# Patient Record
Sex: FEMALE | Race: WHITE | Employment: OTHER | URBAN - NONMETROPOLITAN AREA
[De-identification: names, ages, dates, MRNs, and addresses within clinical notes are randomized per-mention and may not be internally consistent; named-entity substitution may affect disease eponyms.]

---

## 2017-05-19 ENCOUNTER — TELEPHONE (OUTPATIENT)
Dept: CARDIOLOGY | Age: 62
End: 2017-05-19

## 2017-05-22 ENCOUNTER — TELEPHONE (OUTPATIENT)
Dept: CARDIOLOGY | Age: 62
End: 2017-05-22

## 2017-05-25 ENCOUNTER — OFFICE VISIT (OUTPATIENT)
Dept: CARDIOLOGY | Age: 62
End: 2017-05-25
Payer: COMMERCIAL

## 2017-05-25 VITALS
HEIGHT: 66 IN | DIASTOLIC BLOOD PRESSURE: 82 MMHG | WEIGHT: 197 LBS | SYSTOLIC BLOOD PRESSURE: 142 MMHG | BODY MASS INDEX: 31.66 KG/M2 | HEART RATE: 60 BPM

## 2017-05-25 DIAGNOSIS — I48.91 ATRIAL FIBRILLATION, UNSPECIFIED TYPE (HCC): ICD-10-CM

## 2017-05-25 PROCEDURE — 99245 OFF/OP CONSLTJ NEW/EST HI 55: CPT | Performed by: INTERNAL MEDICINE

## 2017-05-25 PROCEDURE — 93000 ELECTROCARDIOGRAM COMPLETE: CPT | Performed by: INTERNAL MEDICINE

## 2017-05-25 RX ORDER — LOSARTAN POTASSIUM 50 MG/1
50 TABLET ORAL DAILY
Qty: 90 TABLET | Refills: 3 | Status: SHIPPED | OUTPATIENT
Start: 2017-05-25 | End: 2017-10-16 | Stop reason: SDUPTHER

## 2017-05-25 RX ORDER — LEVOTHYROXINE SODIUM 0.12 MG/1
125 TABLET ORAL DAILY
COMMUNITY

## 2017-05-25 RX ORDER — METOPROLOL SUCCINATE 25 MG/1
25 TABLET, EXTENDED RELEASE ORAL DAILY
Qty: 90 TABLET | Refills: 3 | Status: SHIPPED | OUTPATIENT
Start: 2017-05-25 | End: 2017-10-16

## 2017-05-26 ENCOUNTER — TELEPHONE (OUTPATIENT)
Dept: CARDIOLOGY | Age: 62
End: 2017-05-26

## 2017-05-26 DIAGNOSIS — R07.89 OTHER CHEST PAIN: Primary | ICD-10-CM

## 2017-05-30 ENCOUNTER — TELEPHONE (OUTPATIENT)
Dept: CARDIOLOGY | Age: 62
End: 2017-05-30

## 2017-05-31 ENCOUNTER — HOSPITAL ENCOUNTER (OUTPATIENT)
Dept: NON INVASIVE DIAGNOSTICS | Age: 62
Discharge: HOME | End: 2017-05-31
Payer: COMMERCIAL

## 2017-05-31 ENCOUNTER — TELEPHONE (OUTPATIENT)
Dept: CARDIOLOGY | Age: 62
End: 2017-05-31

## 2017-05-31 VITALS — DIASTOLIC BLOOD PRESSURE: 77 MMHG | RESPIRATION RATE: 16 BRPM | HEART RATE: 98 BPM | SYSTOLIC BLOOD PRESSURE: 194 MMHG

## 2017-05-31 DIAGNOSIS — R07.89 OTHER CHEST PAIN: Primary | ICD-10-CM

## 2017-05-31 DIAGNOSIS — R07.89 OTHER CHEST PAIN: ICD-10-CM

## 2017-05-31 PROCEDURE — 2580000003 HC RX 258: Performed by: INTERNAL MEDICINE

## 2017-05-31 PROCEDURE — 6360000002 HC RX W HCPCS: Performed by: INTERNAL MEDICINE

## 2017-05-31 PROCEDURE — 93350 STRESS TTE ONLY: CPT

## 2017-05-31 RX ORDER — DOBUTAMINE HYDROCHLORIDE 200 MG/100ML
10 INJECTION INTRAVENOUS ONCE
Status: DISCONTINUED | OUTPATIENT
Start: 2017-05-31 | End: 2017-05-31 | Stop reason: ALTCHOICE

## 2017-05-31 RX ORDER — ATROPINE SULFATE 0.1 MG/ML
1 INJECTION INTRAVENOUS PRN
Status: ACTIVE | OUTPATIENT
Start: 2017-05-31 | End: 2017-06-01

## 2017-05-31 RX ORDER — SODIUM CHLORIDE 0.9 % (FLUSH) 0.9 %
10 SYRINGE (ML) INJECTION PRN
Status: ACTIVE | OUTPATIENT
Start: 2017-05-31 | End: 2017-06-01

## 2017-05-31 RX ORDER — SODIUM CHLORIDE 9 MG/ML
INJECTION, SOLUTION INTRAVENOUS
Status: COMPLETED | OUTPATIENT
Start: 2017-05-31 | End: 2017-05-31

## 2017-05-31 RX ADMIN — DOBUTAMINE HYDROCHLORIDE 10 MCG/KG/MIN: 200 INJECTION INTRAVENOUS at 09:30

## 2017-05-31 RX ADMIN — SODIUM CHLORIDE 50 ML: 9 INJECTION, SOLUTION INTRAVENOUS at 09:30

## 2017-05-31 RX ADMIN — Medication 10 ML: at 09:20

## 2017-05-31 RX ADMIN — ATROPINE SULFATE 0.5 MG: 0.1 INJECTION PARENTERAL at 09:38

## 2017-06-01 ENCOUNTER — TELEPHONE (OUTPATIENT)
Dept: CARDIOLOGY | Age: 62
End: 2017-06-01

## 2017-06-02 DIAGNOSIS — I48.91 ATRIAL FIBRILLATION, UNSPECIFIED TYPE (HCC): Primary | ICD-10-CM

## 2017-06-07 ENCOUNTER — TELEPHONE (OUTPATIENT)
Dept: CARDIOLOGY | Age: 62
End: 2017-06-07

## 2017-06-09 ENCOUNTER — TELEPHONE (OUTPATIENT)
Dept: CARDIOLOGY | Age: 62
End: 2017-06-09

## 2017-06-09 ENCOUNTER — SURG/PROC ORDERS (OUTPATIENT)
Dept: CARDIOLOGY | Age: 62
End: 2017-06-09

## 2017-06-09 DIAGNOSIS — R94.39 ABNORMAL STRESS ECHO: Primary | ICD-10-CM

## 2017-06-12 RX ORDER — SODIUM CHLORIDE 9 MG/ML
INJECTION, SOLUTION INTRAVENOUS CONTINUOUS
Status: CANCELLED | OUTPATIENT
Start: 2017-06-20

## 2017-06-19 ENCOUNTER — TELEPHONE (OUTPATIENT)
Dept: CARDIOLOGY | Age: 62
End: 2017-06-19

## 2017-06-21 ENCOUNTER — APPOINTMENT (OUTPATIENT)
Dept: GENERAL RADIOLOGY | Age: 62
End: 2017-06-21
Attending: INTERNAL MEDICINE
Payer: COMMERCIAL

## 2017-06-21 ENCOUNTER — HOSPITAL ENCOUNTER (OUTPATIENT)
Dept: CARDIAC CATH/INVASIVE PROCEDURES | Age: 62
Discharge: HOME | End: 2017-06-21
Attending: INTERNAL MEDICINE | Admitting: INTERNAL MEDICINE
Payer: COMMERCIAL

## 2017-06-21 VITALS
HEART RATE: 47 BPM | RESPIRATION RATE: 18 BRPM | OXYGEN SATURATION: 97 % | DIASTOLIC BLOOD PRESSURE: 65 MMHG | TEMPERATURE: 98.7 F | SYSTOLIC BLOOD PRESSURE: 133 MMHG

## 2017-06-21 DIAGNOSIS — R94.39 ABNORMAL STRESS ECHO: ICD-10-CM

## 2017-06-21 LAB
ANION GAP SERPL CALCULATED.3IONS-SCNC: 13 MMOL/L (ref 7–19)
BUN BLDV-MCNC: 19 MG/DL (ref 8–23)
CALCIUM SERPL-MCNC: 9.5 MG/DL (ref 8.8–10.2)
CHLORIDE BLD-SCNC: 102 MMOL/L (ref 98–111)
CO2: 27 MMOL/L (ref 22–29)
CREAT SERPL-MCNC: 0.6 MG/DL (ref 0.5–0.9)
GFR NON-AFRICAN AMERICAN: >60
GLUCOSE BLD-MCNC: 121 MG/DL (ref 74–109)
HCT VFR BLD CALC: 39 % (ref 37–47)
HEMOGLOBIN: 12.7 G/DL (ref 12–16)
MCH RBC QN AUTO: 30.2 PG (ref 27–31)
MCHC RBC AUTO-ENTMCNC: 32.6 G/DL (ref 33–37)
MCV RBC AUTO: 92.6 FL (ref 81–99)
PDW BLD-RTO: 12.4 % (ref 11.5–14.5)
PLATELET # BLD: 183 K/UL (ref 130–400)
PMV BLD AUTO: 10.4 FL (ref 9.4–12.3)
POTASSIUM SERPL-SCNC: 3.7 MMOL/L (ref 3.5–5)
RBC # BLD: 4.21 M/UL (ref 4.2–5.4)
SODIUM BLD-SCNC: 142 MMOL/L (ref 136–145)
WBC # BLD: 5.7 K/UL (ref 4.8–10.8)

## 2017-06-21 PROCEDURE — 36415 COLL VENOUS BLD VENIPUNCTURE: CPT

## 2017-06-21 PROCEDURE — 93458 L HRT ARTERY/VENTRICLE ANGIO: CPT | Performed by: INTERNAL MEDICINE

## 2017-06-21 PROCEDURE — 2709999900 HC NON-CHARGEABLE SUPPLY

## 2017-06-21 PROCEDURE — 2500000003 HC RX 250 WO HCPCS

## 2017-06-21 PROCEDURE — 80048 BASIC METABOLIC PNL TOTAL CA: CPT

## 2017-06-21 PROCEDURE — 85027 COMPLETE CBC AUTOMATED: CPT

## 2017-06-21 PROCEDURE — 6360000002 HC RX W HCPCS

## 2017-06-21 PROCEDURE — 93005 ELECTROCARDIOGRAM TRACING: CPT

## 2017-06-21 PROCEDURE — C1887 CATHETER, GUIDING: HCPCS

## 2017-06-21 PROCEDURE — 71020 XR CHEST STANDARD TWO VW: CPT

## 2017-06-21 PROCEDURE — C1894 INTRO/SHEATH, NON-LASER: HCPCS

## 2017-06-21 PROCEDURE — 2720000001 HC MISC SURG SUPPLY STERILE $51-500

## 2017-06-21 RX ORDER — SODIUM CHLORIDE 0.9 % (FLUSH) 0.9 %
10 SYRINGE (ML) INJECTION PRN
Status: DISCONTINUED | OUTPATIENT
Start: 2017-06-21 | End: 2017-06-21 | Stop reason: HOSPADM

## 2017-06-21 RX ORDER — SODIUM CHLORIDE 9 MG/ML
INJECTION, SOLUTION INTRAVENOUS CONTINUOUS
Status: DISCONTINUED | OUTPATIENT
Start: 2017-06-21 | End: 2017-06-21 | Stop reason: HOSPADM

## 2017-06-21 RX ORDER — SODIUM CHLORIDE 0.9 % (FLUSH) 0.9 %
10 SYRINGE (ML) INJECTION EVERY 12 HOURS SCHEDULED
Status: DISCONTINUED | OUTPATIENT
Start: 2017-06-21 | End: 2017-06-21 | Stop reason: HOSPADM

## 2017-06-21 RX ORDER — SODIUM CHLORIDE 9 MG/ML
INJECTION, SOLUTION INTRAVENOUS CONTINUOUS
Status: ACTIVE | OUTPATIENT
Start: 2017-06-21 | End: 2017-06-21

## 2017-07-13 ENCOUNTER — OFFICE VISIT (OUTPATIENT)
Dept: CARDIOLOGY | Age: 62
End: 2017-07-13
Payer: COMMERCIAL

## 2017-07-13 VITALS
WEIGHT: 195 LBS | SYSTOLIC BLOOD PRESSURE: 110 MMHG | HEART RATE: 60 BPM | HEIGHT: 66 IN | BODY MASS INDEX: 31.34 KG/M2 | DIASTOLIC BLOOD PRESSURE: 70 MMHG

## 2017-07-13 DIAGNOSIS — I48.91 ATRIAL FIBRILLATION, UNSPECIFIED TYPE (HCC): Primary | ICD-10-CM

## 2017-07-13 PROCEDURE — 99214 OFFICE O/P EST MOD 30 MIN: CPT | Performed by: INTERNAL MEDICINE

## 2017-07-13 PROCEDURE — 93000 ELECTROCARDIOGRAM COMPLETE: CPT | Performed by: INTERNAL MEDICINE

## 2017-08-01 ENCOUNTER — TELEPHONE (OUTPATIENT)
Dept: CARDIOLOGY | Age: 62
End: 2017-08-01

## 2017-08-14 ENCOUNTER — TELEPHONE (OUTPATIENT)
Dept: CARDIOLOGY | Age: 62
End: 2017-08-14

## 2017-08-16 DIAGNOSIS — I48.91 ATRIAL FIBRILLATION, UNSPECIFIED TYPE (HCC): Primary | ICD-10-CM

## 2017-10-16 ENCOUNTER — OFFICE VISIT (OUTPATIENT)
Dept: CARDIOLOGY | Age: 62
End: 2017-10-16
Payer: COMMERCIAL

## 2017-10-16 VITALS
DIASTOLIC BLOOD PRESSURE: 68 MMHG | HEART RATE: 64 BPM | BODY MASS INDEX: 32.62 KG/M2 | HEIGHT: 66 IN | SYSTOLIC BLOOD PRESSURE: 124 MMHG | WEIGHT: 203 LBS

## 2017-10-16 DIAGNOSIS — I48.0 PAROXYSMAL ATRIAL FIBRILLATION (HCC): Primary | ICD-10-CM

## 2017-10-16 PROCEDURE — 99214 OFFICE O/P EST MOD 30 MIN: CPT | Performed by: INTERNAL MEDICINE

## 2017-10-16 PROCEDURE — 93000 ELECTROCARDIOGRAM COMPLETE: CPT | Performed by: INTERNAL MEDICINE

## 2017-10-16 RX ORDER — METOPROLOL SUCCINATE 25 MG/1
12.5 TABLET, EXTENDED RELEASE ORAL NIGHTLY
Status: ON HOLD | COMMUNITY
End: 2018-03-21 | Stop reason: HOSPADM

## 2017-10-17 RX ORDER — LOSARTAN POTASSIUM 50 MG/1
50 TABLET ORAL 2 TIMES DAILY
Qty: 60 TABLET | Refills: 3 | Status: ON HOLD | OUTPATIENT
Start: 2017-10-17 | End: 2018-03-17 | Stop reason: DRUGHIGH

## 2017-10-17 NOTE — PROGRESS NOTES
6\" (1.676 m)   Wt 203 lb (92.1 kg)   BMI 32.77 kg/m²    HEENT  The head is normocephalic. Moist mucous membranes. Neck - Supple, without increased jugular venous pressures. No carotid bruits. Respiratory -   Clear  Cardiovascular  Normal heart sounds. Abdominal -  Soft. Bowel sounds present. Nontender. Extremities - Warm. Posterior tibial and radial pulses are normal.   Musculoskeletal - Gait/station is normal.    Skin - Normal turgor, warm. Neurological - Answers questions appropriately with normal affect and mood. Data   to EKG from today shows normal sinus rhythm poor R-wave progression. Her multi-day heart monitor which I reviewed showed sinus rhythm, resting bradycardia, short burst of atrial tachycardia. Almost all of her triggers were secondary to the ectopy occasionally however they were also related to her very brief episodes of atrial tachycardia. Impression/Plan    Paroxysmal atrial fibrillation without recurrence  PACs and PVCs  Brief episodes of atrial tachycardia  Essential hypertension    We will increase her losartan to 50 mg twice a day and she will continue checking her blood pressure. She asked me many questions about natural remedies, over-the-counter and I tried to answer those questions but told her that we do not have data on lots of the over-the-counter medications available. Follow up with me in 3 months. She'll contact me if she has any prolonged episodes of palpitations. Jeannine Contreras. Rut Ruffin M.D.   [facility]

## 2018-02-12 ENCOUNTER — OFFICE VISIT (OUTPATIENT)
Dept: CARDIOLOGY | Age: 63
End: 2018-02-12
Payer: COMMERCIAL

## 2018-02-12 VITALS
HEART RATE: 53 BPM | WEIGHT: 201 LBS | DIASTOLIC BLOOD PRESSURE: 82 MMHG | SYSTOLIC BLOOD PRESSURE: 138 MMHG | BODY MASS INDEX: 33.49 KG/M2 | HEIGHT: 65 IN

## 2018-02-12 DIAGNOSIS — I48.0 PAROXYSMAL ATRIAL FIBRILLATION (HCC): Primary | ICD-10-CM

## 2018-02-12 PROCEDURE — 93000 ELECTROCARDIOGRAM COMPLETE: CPT | Performed by: INTERNAL MEDICINE

## 2018-02-12 PROCEDURE — 99214 OFFICE O/P EST MOD 30 MIN: CPT | Performed by: INTERNAL MEDICINE

## 2018-03-17 ENCOUNTER — HOSPITAL ENCOUNTER (INPATIENT)
Age: 63
LOS: 3 days | Discharge: HOME | DRG: 310 | End: 2018-03-21
Attending: INTERNAL MEDICINE | Admitting: INTERNAL MEDICINE
Payer: COMMERCIAL

## 2018-03-17 PROBLEM — I48.91 ATRIAL FIBRILLATION WITH RVR (HCC): Status: ACTIVE | Noted: 2018-03-17

## 2018-03-17 LAB
T4 FREE: 1.2 NG/DL (ref 0.9–1.7)
TROPONIN: <0.01 NG/ML (ref 0–0.03)
TROPONIN: <0.01 NG/ML (ref 0–0.03)
TSH SERPL DL<=0.05 MIU/L-ACNC: 9.33 UIU/ML (ref 0.27–4.2)

## 2018-03-17 PROCEDURE — 6370000000 HC RX 637 (ALT 250 FOR IP): Performed by: INTERNAL MEDICINE

## 2018-03-17 PROCEDURE — 84443 ASSAY THYROID STIM HORMONE: CPT

## 2018-03-17 PROCEDURE — G0378 HOSPITAL OBSERVATION PER HR: HCPCS

## 2018-03-17 PROCEDURE — 84484 ASSAY OF TROPONIN QUANT: CPT

## 2018-03-17 PROCEDURE — 99222 1ST HOSP IP/OBS MODERATE 55: CPT | Performed by: INTERNAL MEDICINE

## 2018-03-17 PROCEDURE — 2580000003 HC RX 258: Performed by: INTERNAL MEDICINE

## 2018-03-17 PROCEDURE — 84439 ASSAY OF FREE THYROXINE: CPT

## 2018-03-17 PROCEDURE — 2500000003 HC RX 250 WO HCPCS: Performed by: INTERNAL MEDICINE

## 2018-03-17 PROCEDURE — 36415 COLL VENOUS BLD VENIPUNCTURE: CPT

## 2018-03-17 RX ORDER — ACETAMINOPHEN 325 MG/1
650 TABLET ORAL EVERY 4 HOURS PRN
Status: DISCONTINUED | OUTPATIENT
Start: 2018-03-17 | End: 2018-03-21 | Stop reason: HOSPADM

## 2018-03-17 RX ORDER — LOSARTAN POTASSIUM 50 MG/1
50 TABLET ORAL EVERY MORNING
COMMUNITY
End: 2018-10-22 | Stop reason: SDUPTHER

## 2018-03-17 RX ORDER — ONDANSETRON 2 MG/ML
4 INJECTION INTRAMUSCULAR; INTRAVENOUS EVERY 6 HOURS PRN
Status: DISCONTINUED | OUTPATIENT
Start: 2018-03-17 | End: 2018-03-21 | Stop reason: HOSPADM

## 2018-03-17 RX ORDER — METOPROLOL SUCCINATE 25 MG/1
25 TABLET, EXTENDED RELEASE ORAL 2 TIMES DAILY
Status: DISCONTINUED | OUTPATIENT
Start: 2018-03-17 | End: 2018-03-18

## 2018-03-17 RX ORDER — ASPIRIN 81 MG/1
81 TABLET, CHEWABLE ORAL DAILY
Status: DISCONTINUED | OUTPATIENT
Start: 2018-03-17 | End: 2018-03-21 | Stop reason: HOSPADM

## 2018-03-17 RX ORDER — CHLORAL HYDRATE 500 MG
1000 CAPSULE ORAL 2 TIMES DAILY
COMMUNITY

## 2018-03-17 RX ORDER — LEVOTHYROXINE SODIUM 0.12 MG/1
125 TABLET ORAL DAILY
Status: DISCONTINUED | OUTPATIENT
Start: 2018-03-17 | End: 2018-03-21 | Stop reason: HOSPADM

## 2018-03-17 RX ORDER — SODIUM CHLORIDE 0.9 % (FLUSH) 0.9 %
10 SYRINGE (ML) INJECTION PRN
Status: DISCONTINUED | OUTPATIENT
Start: 2018-03-17 | End: 2018-03-21 | Stop reason: HOSPADM

## 2018-03-17 RX ORDER — LOSARTAN POTASSIUM 25 MG/1
25 TABLET ORAL NIGHTLY
COMMUNITY
End: 2018-10-22 | Stop reason: SDUPTHER

## 2018-03-17 RX ORDER — SODIUM CHLORIDE 0.9 % (FLUSH) 0.9 %
10 SYRINGE (ML) INJECTION EVERY 12 HOURS SCHEDULED
Status: DISCONTINUED | OUTPATIENT
Start: 2018-03-17 | End: 2018-03-21 | Stop reason: HOSPADM

## 2018-03-17 RX ORDER — LOSARTAN POTASSIUM 50 MG/1
25 TABLET ORAL NIGHTLY
Status: DISCONTINUED | OUTPATIENT
Start: 2018-03-17 | End: 2018-03-17

## 2018-03-17 RX ORDER — CETIRIZINE HYDROCHLORIDE 10 MG/1
10 TABLET ORAL DAILY
Status: DISCONTINUED | OUTPATIENT
Start: 2018-03-17 | End: 2018-03-21 | Stop reason: HOSPADM

## 2018-03-17 RX ORDER — LOSARTAN POTASSIUM 50 MG/1
50 TABLET ORAL EVERY MORNING
Status: DISCONTINUED | OUTPATIENT
Start: 2018-03-18 | End: 2018-03-21 | Stop reason: HOSPADM

## 2018-03-17 RX ORDER — METOPROLOL SUCCINATE 25 MG/1
12.5 TABLET, EXTENDED RELEASE ORAL NIGHTLY
Status: DISCONTINUED | OUTPATIENT
Start: 2018-03-17 | End: 2018-03-17

## 2018-03-17 RX ADMIN — ACETAMINOPHEN 650 MG: 325 TABLET, FILM COATED ORAL at 17:53

## 2018-03-17 RX ADMIN — ACETAMINOPHEN 650 MG: 325 TABLET, FILM COATED ORAL at 22:35

## 2018-03-17 RX ADMIN — APIXABAN 5 MG: 2.5 TABLET, FILM COATED ORAL at 20:54

## 2018-03-17 RX ADMIN — CETIRIZINE HYDROCHLORIDE 10 MG: 10 TABLET, FILM COATED ORAL at 17:53

## 2018-03-17 RX ADMIN — METOPROLOL SUCCINATE 25 MG: 25 TABLET, EXTENDED RELEASE ORAL at 20:54

## 2018-03-17 RX ADMIN — Medication 10 ML: at 20:55

## 2018-03-17 ASSESSMENT — ENCOUNTER SYMPTOMS
COUGH: 1
NAUSEA: 1
ORTHOPNEA: 0
SHORTNESS OF BREATH: 1
VOMITING: 1

## 2018-03-17 ASSESSMENT — PAIN SCALES - GENERAL
PAINLEVEL_OUTOF10: 4
PAINLEVEL_OUTOF10: 3
PAINLEVEL_OUTOF10: 0
PAINLEVEL_OUTOF10: 2
PAINLEVEL_OUTOF10: 0
PAINLEVEL_OUTOF10: 0

## 2018-03-17 NOTE — H&P
Onset    Heart Attack Mother     High Blood Pressure Father     Pacemaker Father     Diabetes Sister     High Blood Pressure Sister        Social History     Social History    Marital status:      Spouse name: N/A    Number of children: N/A    Years of education: N/A     Occupational History    Not on file. Social History Main Topics    Smoking status: Never Smoker    Smokeless tobacco: Never Used    Alcohol use No    Drug use: No    Sexual activity: Not on file     Other Topics Concern    Not on file     Social History Narrative    No narrative on file       No Known Allergies    Current Meds      Current Infused Meds   diltiazem         PE:  Vitals:    03/17/18 1010   BP: 133/86   Pulse: 81   Resp: 16   Temp: 97.6 °F (36.4 °C)   SpO2: 96%     No intake or output data in the 24 hours ending 03/17/18 1219  Estimated body mass index is 31.51 kg/m² as calculated from the following:    Height as of this encounter: 5' 6\" (1.676 m). Weight as of this encounter: 195 lb 4 oz (88.6 kg). General - No acute distress  Eyes - PERRL, anicteric sclerae; no lid-lag  ENMT - Atraumatic; Mucous membranes moist, oropharynx clear  Neck - trachea midline, thyroid non-tender  Cardio - No jugular venous distension                Clear s1 s2, Irregularly irregular, no gallop, rub, murmur                 No edema, normal pulses  Resp - Normal effort, Clear to auscultation bilaterally  GI - abdomen soft, non-tender, no hepatosplenomegaly  Skin - warm and dry; no rashes  Psych - A+O x 3, normal affect    No results for input(s): WBC, HGB, PLT in the last 72 hours. No results for input(s): NA, K, CL, CO2, BUN, CREATININE, LABGLOM, MG, CALCIUM, PHOS in the last 72 hours. No results for input(s): Kayren Persons in the last 72 hours. ECG 03/17/18 Atrial fibrillation with RVR 140s. No acute changes     TTE     Cath 06/22/2017 No significant CAD.       Assessment, Recommendations, & Plan:  60 y.o. female bilaterally  GI - abdomen soft, non-tender, no hepatosplenomegaly  Skin - warm and dry; no rashes  Psych - A+O x 3, normal affect    Assessment and plan: 26-year-old woman with paroxysmal atrial fibrillation with A. fib with RVR and hypertension and rhinorrhea    PAF - will start her on diltiazem drip, increase Toprol XL to 25 mg twice daily, consider transit thoracic echo to rule out structural heart disease    Hypertension - monitor blood pressure with additional Toprol-XL, decrease losartan to 50 mg daily    Rhinorrhea  - start Zyrtec 10 mg daily    Disposition - admit to observation to [facility]    Brandt Mooney MD, MSc  Director of the [facility] [place]    3/17/2018 4:31 PM

## 2018-03-18 LAB
ANION GAP SERPL CALCULATED.3IONS-SCNC: 12 MMOL/L (ref 7–19)
BUN BLDV-MCNC: 20 MG/DL (ref 8–23)
CALCIUM SERPL-MCNC: 9.2 MG/DL (ref 8.8–10.2)
CHLORIDE BLD-SCNC: 103 MMOL/L (ref 98–111)
CO2: 23 MMOL/L (ref 22–29)
CREAT SERPL-MCNC: 0.5 MG/DL (ref 0.5–0.9)
GFR NON-AFRICAN AMERICAN: >60
GLUCOSE BLD-MCNC: 115 MG/DL (ref 74–109)
HCT VFR BLD CALC: 44 % (ref 37–47)
HEMOGLOBIN: 13.9 G/DL (ref 12–16)
LV EF: 63 %
LVEF MODALITY: NORMAL
MCH RBC QN AUTO: 29.6 PG (ref 27–31)
MCHC RBC AUTO-ENTMCNC: 31.6 G/DL (ref 33–37)
MCV RBC AUTO: 93.8 FL (ref 81–99)
PDW BLD-RTO: 13.1 % (ref 11.5–14.5)
PLATELET # BLD: 184 K/UL (ref 130–400)
PMV BLD AUTO: 10.5 FL (ref 9.4–12.3)
POTASSIUM SERPL-SCNC: 4.1 MMOL/L (ref 3.5–5)
RBC # BLD: 4.69 M/UL (ref 4.2–5.4)
SODIUM BLD-SCNC: 138 MMOL/L (ref 136–145)
TROPONIN: <0.01 NG/ML (ref 0–0.03)
WBC # BLD: 9.4 K/UL (ref 4.8–10.8)

## 2018-03-18 PROCEDURE — 6370000000 HC RX 637 (ALT 250 FOR IP): Performed by: INTERNAL MEDICINE

## 2018-03-18 PROCEDURE — 80048 BASIC METABOLIC PNL TOTAL CA: CPT

## 2018-03-18 PROCEDURE — 2700000000 HC OXYGEN THERAPY PER DAY

## 2018-03-18 PROCEDURE — 93306 TTE W/DOPPLER COMPLETE: CPT

## 2018-03-18 PROCEDURE — 2580000003 HC RX 258: Performed by: INTERNAL MEDICINE

## 2018-03-18 PROCEDURE — G0378 HOSPITAL OBSERVATION PER HR: HCPCS

## 2018-03-18 PROCEDURE — 96366 THER/PROPH/DIAG IV INF ADDON: CPT

## 2018-03-18 PROCEDURE — 99231 SBSQ HOSP IP/OBS SF/LOW 25: CPT | Performed by: INTERNAL MEDICINE

## 2018-03-18 PROCEDURE — 2500000003 HC RX 250 WO HCPCS: Performed by: INTERNAL MEDICINE

## 2018-03-18 PROCEDURE — 36415 COLL VENOUS BLD VENIPUNCTURE: CPT

## 2018-03-18 PROCEDURE — 96365 THER/PROPH/DIAG IV INF INIT: CPT

## 2018-03-18 PROCEDURE — 93005 ELECTROCARDIOGRAM TRACING: CPT | Performed by: NURSE PRACTITIONER

## 2018-03-18 PROCEDURE — 85027 COMPLETE CBC AUTOMATED: CPT

## 2018-03-18 PROCEDURE — 84484 ASSAY OF TROPONIN QUANT: CPT

## 2018-03-18 RX ORDER — SODIUM CHLORIDE 0.9 % (FLUSH) 0.9 %
10 SYRINGE (ML) INJECTION EVERY 12 HOURS SCHEDULED
Status: CANCELLED | OUTPATIENT
Start: 2018-03-18

## 2018-03-18 RX ORDER — SODIUM CHLORIDE 0.9 % (FLUSH) 0.9 %
10 SYRINGE (ML) INJECTION PRN
Status: CANCELLED | OUTPATIENT
Start: 2018-03-18

## 2018-03-18 RX ORDER — SODIUM CHLORIDE 9 MG/ML
INJECTION, SOLUTION INTRAVENOUS CONTINUOUS
Status: CANCELLED | OUTPATIENT
Start: 2018-03-18

## 2018-03-18 RX ORDER — SOTALOL HYDROCHLORIDE 80 MG/1
80 TABLET ORAL 2 TIMES DAILY
Status: DISCONTINUED | OUTPATIENT
Start: 2018-03-18 | End: 2018-03-21 | Stop reason: HOSPADM

## 2018-03-18 RX ADMIN — DILTIAZEM HYDROCHLORIDE 5 MG/HR: 5 INJECTION INTRAVENOUS at 04:23

## 2018-03-18 RX ADMIN — APIXABAN 5 MG: 2.5 TABLET, FILM COATED ORAL at 20:42

## 2018-03-18 RX ADMIN — METOPROLOL SUCCINATE 25 MG: 25 TABLET, EXTENDED RELEASE ORAL at 09:43

## 2018-03-18 RX ADMIN — ASPIRIN 81 MG CHEWABLE TABLET 81 MG: 81 TABLET CHEWABLE at 09:43

## 2018-03-18 RX ADMIN — CETIRIZINE HYDROCHLORIDE 10 MG: 10 TABLET, FILM COATED ORAL at 09:43

## 2018-03-18 RX ADMIN — LEVOTHYROXINE SODIUM 125 MCG: 125 TABLET ORAL at 05:16

## 2018-03-18 RX ADMIN — APIXABAN 5 MG: 2.5 TABLET, FILM COATED ORAL at 09:43

## 2018-03-18 RX ADMIN — SOTALOL HYDROCHLORIDE 80 MG: 80 TABLET ORAL at 20:42

## 2018-03-18 RX ADMIN — Medication 10 ML: at 20:42

## 2018-03-18 ASSESSMENT — PAIN SCALES - GENERAL
PAINLEVEL_OUTOF10: 0
PAINLEVEL_OUTOF10: 0

## 2018-03-18 NOTE — PROGRESS NOTES
[facility]  Daily Progress Note      Chief Complaint: f/u afib    Interval Hx: no chest pain, palpitations, congestion better    ROS:  The rest of the systems are negative except Interval Hx    Current Inpatient Medications:   aspirin  81 mg Oral Daily    levothyroxine  125 mcg Oral Daily    losartan  50 mg Oral QAM    sodium chloride flush  10 mL Intravenous 2 times per day    apixaban  5 mg Oral BID    metoprolol succinate  25 mg Oral BID    cetirizine  10 mg Oral Daily       IV Infusions (if any):   diltiazem Stopped (03/18/18 1115)       Physical Exam:   /81   Pulse 87   Temp 97.1 °F (36.2 °C) (Temporal)   Resp 16   Ht 5' 6\" (1.676 m)   Wt 197 lb (89.4 kg)   SpO2 98%   Breastfeeding?  No   BMI 31.80 kg/m²       Intake/Output Summary (Last 24 hours) at 03/18/18 1259  Last data filed at 03/18/18 0855   Gross per 24 hour   Intake             1030 ml   Output              600 ml   Net              430 ml       Gen - NAD  Neck - Supple  ENMT - MMM, OP Clear  Cardio - No JVD    Irregurarly irregular, no gallop, rub, murmur                No edema, 2+ radials  Resp - Normal effort, CTA Bilat  GI - soft, non-tender, no HSM  Psych - A+O x 3, normal affect     Diagnostics:      Recent Labs      03/18/18   0616   WBC  9.4   HGB  13.9   PLT  184      Recent Labs      03/18/18   0616   NA  138   K  4.1   CL  103   CO2  23   BUN  20   CREATININE  0.5   LABGLOM  >60   CALCIUM  9.2      Recent Labs      03/17/18   1739  03/18/18   0038  03/18/18   0616   TROPONINI  <0.01  <0.01  <0.01        Tele - afib rate contorlled    Assessment:     60 y.o. female with PAF, rhinorhea    Plan:     - continue with current medical therapy  - check TTE for structural heart disease  - NPO tonight for DCCV tomorrow    Sally Gomes MD  [facility] [place]    3/18/2018 12:59 PM

## 2018-03-19 LAB
HCT VFR BLD CALC: 37.9 % (ref 37–47)
HEMOGLOBIN: 12.3 G/DL (ref 12–16)
MCH RBC QN AUTO: 30.4 PG (ref 27–31)
MCHC RBC AUTO-ENTMCNC: 32.5 G/DL (ref 33–37)
MCV RBC AUTO: 93.8 FL (ref 81–99)
PDW BLD-RTO: 13 % (ref 11.5–14.5)
PLATELET # BLD: 180 K/UL (ref 130–400)
PMV BLD AUTO: 10.4 FL (ref 9.4–12.3)
RBC # BLD: 4.04 M/UL (ref 4.2–5.4)
TROPONIN: <0.01 NG/ML (ref 0–0.03)
WBC # BLD: 8.8 K/UL (ref 4.8–10.8)

## 2018-03-19 PROCEDURE — 99231 SBSQ HOSP IP/OBS SF/LOW 25: CPT | Performed by: INTERNAL MEDICINE

## 2018-03-19 PROCEDURE — 84484 ASSAY OF TROPONIN QUANT: CPT

## 2018-03-19 PROCEDURE — 6370000000 HC RX 637 (ALT 250 FOR IP): Performed by: INTERNAL MEDICINE

## 2018-03-19 PROCEDURE — 85027 COMPLETE CBC AUTOMATED: CPT

## 2018-03-19 PROCEDURE — 36415 COLL VENOUS BLD VENIPUNCTURE: CPT

## 2018-03-19 PROCEDURE — 93005 ELECTROCARDIOGRAM TRACING: CPT

## 2018-03-19 PROCEDURE — 2580000003 HC RX 258: Performed by: INTERNAL MEDICINE

## 2018-03-19 PROCEDURE — 2700000000 HC OXYGEN THERAPY PER DAY

## 2018-03-19 PROCEDURE — 2140000000 HC CCU INTERMEDIATE R&B

## 2018-03-19 RX ADMIN — LOSARTAN POTASSIUM 50 MG: 50 TABLET ORAL at 08:55

## 2018-03-19 RX ADMIN — Medication 10 ML: at 08:55

## 2018-03-19 RX ADMIN — APIXABAN 5 MG: 2.5 TABLET, FILM COATED ORAL at 20:07

## 2018-03-19 RX ADMIN — LEVOTHYROXINE SODIUM 125 MCG: 125 TABLET ORAL at 08:55

## 2018-03-19 RX ADMIN — ASPIRIN 81 MG CHEWABLE TABLET 81 MG: 81 TABLET CHEWABLE at 08:55

## 2018-03-19 RX ADMIN — CETIRIZINE HYDROCHLORIDE 10 MG: 10 TABLET, FILM COATED ORAL at 08:55

## 2018-03-19 RX ADMIN — SOTALOL HYDROCHLORIDE 80 MG: 80 TABLET ORAL at 08:55

## 2018-03-19 RX ADMIN — Medication 10 ML: at 20:07

## 2018-03-19 RX ADMIN — APIXABAN 5 MG: 2.5 TABLET, FILM COATED ORAL at 08:55

## 2018-03-19 RX ADMIN — SOTALOL HYDROCHLORIDE 80 MG: 80 TABLET ORAL at 20:07

## 2018-03-19 ASSESSMENT — PAIN SCALES - GENERAL: PAINLEVEL_OUTOF10: 0

## 2018-03-20 LAB
ANION GAP SERPL CALCULATED.3IONS-SCNC: 14 MMOL/L (ref 7–19)
BUN BLDV-MCNC: 18 MG/DL (ref 8–23)
CALCIUM SERPL-MCNC: 9 MG/DL (ref 8.8–10.2)
CHLORIDE BLD-SCNC: 104 MMOL/L (ref 98–111)
CO2: 24 MMOL/L (ref 22–29)
CREAT SERPL-MCNC: 0.5 MG/DL (ref 0.5–0.9)
EKG P AXIS: 21 DEGREES
EKG P-R INTERVAL: 146 MS
EKG Q-T INTERVAL: 420 MS
EKG QRS DURATION: 94 MS
EKG QTC CALCULATION (BAZETT): 408 MS
EKG T AXIS: 27 DEGREES
GFR NON-AFRICAN AMERICAN: >60
GLUCOSE BLD-MCNC: 97 MG/DL (ref 74–109)
HCT VFR BLD CALC: 36.5 % (ref 37–47)
HEMOGLOBIN: 12.3 G/DL (ref 12–16)
MCH RBC QN AUTO: 30.7 PG (ref 27–31)
MCHC RBC AUTO-ENTMCNC: 33.7 G/DL (ref 33–37)
MCV RBC AUTO: 91 FL (ref 81–99)
PDW BLD-RTO: 12.6 % (ref 11.5–14.5)
PLATELET # BLD: 173 K/UL (ref 130–400)
PMV BLD AUTO: 10.4 FL (ref 9.4–12.3)
POTASSIUM SERPL-SCNC: 3.7 MMOL/L (ref 3.5–5)
RBC # BLD: 4.01 M/UL (ref 4.2–5.4)
SODIUM BLD-SCNC: 142 MMOL/L (ref 136–145)
TROPONIN: <0.01 NG/ML (ref 0–0.03)
WBC # BLD: 8.1 K/UL (ref 4.8–10.8)

## 2018-03-20 PROCEDURE — 2580000003 HC RX 258: Performed by: INTERNAL MEDICINE

## 2018-03-20 PROCEDURE — 2140000000 HC CCU INTERMEDIATE R&B

## 2018-03-20 PROCEDURE — 99231 SBSQ HOSP IP/OBS SF/LOW 25: CPT | Performed by: INTERNAL MEDICINE

## 2018-03-20 PROCEDURE — 84484 ASSAY OF TROPONIN QUANT: CPT

## 2018-03-20 PROCEDURE — 80048 BASIC METABOLIC PNL TOTAL CA: CPT

## 2018-03-20 PROCEDURE — 85027 COMPLETE CBC AUTOMATED: CPT

## 2018-03-20 PROCEDURE — 93005 ELECTROCARDIOGRAM TRACING: CPT

## 2018-03-20 PROCEDURE — 36415 COLL VENOUS BLD VENIPUNCTURE: CPT

## 2018-03-20 PROCEDURE — 6370000000 HC RX 637 (ALT 250 FOR IP): Performed by: NURSE PRACTITIONER

## 2018-03-20 PROCEDURE — 6370000000 HC RX 637 (ALT 250 FOR IP): Performed by: INTERNAL MEDICINE

## 2018-03-20 RX ORDER — FLUTICASONE PROPIONATE 50 MCG
1 SPRAY, SUSPENSION (ML) NASAL DAILY
Status: DISCONTINUED | OUTPATIENT
Start: 2018-03-20 | End: 2018-03-21 | Stop reason: HOSPADM

## 2018-03-20 RX ORDER — BENZONATATE 100 MG/1
100 CAPSULE ORAL 3 TIMES DAILY PRN
Status: DISCONTINUED | OUTPATIENT
Start: 2018-03-20 | End: 2018-03-21 | Stop reason: HOSPADM

## 2018-03-20 RX ADMIN — SOTALOL HYDROCHLORIDE 80 MG: 80 TABLET ORAL at 19:54

## 2018-03-20 RX ADMIN — Medication 10 ML: at 19:54

## 2018-03-20 RX ADMIN — Medication 10 ML: at 10:23

## 2018-03-20 RX ADMIN — LEVOTHYROXINE SODIUM 125 MCG: 125 TABLET ORAL at 05:40

## 2018-03-20 RX ADMIN — CETIRIZINE HYDROCHLORIDE 10 MG: 10 TABLET, FILM COATED ORAL at 10:22

## 2018-03-20 RX ADMIN — FLUTICASONE PROPIONATE 1 SPRAY: 50 SPRAY, METERED NASAL at 10:22

## 2018-03-20 RX ADMIN — APIXABAN 5 MG: 2.5 TABLET, FILM COATED ORAL at 19:54

## 2018-03-20 RX ADMIN — LOSARTAN POTASSIUM 50 MG: 50 TABLET ORAL at 10:22

## 2018-03-20 RX ADMIN — ASPIRIN 81 MG CHEWABLE TABLET 81 MG: 81 TABLET CHEWABLE at 10:22

## 2018-03-20 RX ADMIN — BENZONATATE 100 MG: 100 CAPSULE ORAL at 19:58

## 2018-03-20 RX ADMIN — BENZONATATE 100 MG: 100 CAPSULE ORAL at 10:22

## 2018-03-20 RX ADMIN — APIXABAN 5 MG: 2.5 TABLET, FILM COATED ORAL at 10:22

## 2018-03-20 RX ADMIN — SOTALOL HYDROCHLORIDE 80 MG: 80 TABLET ORAL at 10:22

## 2018-03-20 ASSESSMENT — PAIN SCALES - GENERAL
PAINLEVEL_OUTOF10: 0
PAINLEVEL_OUTOF10: 0

## 2018-03-21 VITALS
OXYGEN SATURATION: 98 % | BODY MASS INDEX: 32.05 KG/M2 | HEART RATE: 55 BPM | RESPIRATION RATE: 16 BRPM | WEIGHT: 199.4 LBS | DIASTOLIC BLOOD PRESSURE: 71 MMHG | SYSTOLIC BLOOD PRESSURE: 131 MMHG | TEMPERATURE: 97 F | HEIGHT: 66 IN

## 2018-03-21 LAB
ANION GAP SERPL CALCULATED.3IONS-SCNC: 13 MMOL/L (ref 7–19)
BUN BLDV-MCNC: 20 MG/DL (ref 8–23)
CALCIUM SERPL-MCNC: 9.1 MG/DL (ref 8.8–10.2)
CHLORIDE BLD-SCNC: 103 MMOL/L (ref 98–111)
CO2: 26 MMOL/L (ref 22–29)
CREAT SERPL-MCNC: 0.7 MG/DL (ref 0.5–0.9)
GFR NON-AFRICAN AMERICAN: >60
GLUCOSE BLD-MCNC: 103 MG/DL (ref 74–109)
HCT VFR BLD CALC: 36.1 % (ref 37–47)
HEMOGLOBIN: 11.9 G/DL (ref 12–16)
MCH RBC QN AUTO: 30.3 PG (ref 27–31)
MCHC RBC AUTO-ENTMCNC: 33 G/DL (ref 33–37)
MCV RBC AUTO: 91.9 FL (ref 81–99)
PDW BLD-RTO: 12.5 % (ref 11.5–14.5)
PLATELET # BLD: 181 K/UL (ref 130–400)
PMV BLD AUTO: 10.3 FL (ref 9.4–12.3)
POTASSIUM SERPL-SCNC: 4 MMOL/L (ref 3.5–5)
RBC # BLD: 3.93 M/UL (ref 4.2–5.4)
SODIUM BLD-SCNC: 142 MMOL/L (ref 136–145)
TROPONIN: <0.01 NG/ML (ref 0–0.03)
TROPONIN: <0.01 NG/ML (ref 0–0.03)
WBC # BLD: 7.7 K/UL (ref 4.8–10.8)

## 2018-03-21 PROCEDURE — 36415 COLL VENOUS BLD VENIPUNCTURE: CPT

## 2018-03-21 PROCEDURE — 6370000000 HC RX 637 (ALT 250 FOR IP): Performed by: INTERNAL MEDICINE

## 2018-03-21 PROCEDURE — 84484 ASSAY OF TROPONIN QUANT: CPT

## 2018-03-21 PROCEDURE — 80048 BASIC METABOLIC PNL TOTAL CA: CPT

## 2018-03-21 PROCEDURE — 99238 HOSP IP/OBS DSCHRG MGMT 30/<: CPT | Performed by: INTERNAL MEDICINE

## 2018-03-21 PROCEDURE — 2580000003 HC RX 258: Performed by: INTERNAL MEDICINE

## 2018-03-21 PROCEDURE — 6370000000 HC RX 637 (ALT 250 FOR IP): Performed by: NURSE PRACTITIONER

## 2018-03-21 PROCEDURE — 93005 ELECTROCARDIOGRAM TRACING: CPT

## 2018-03-21 PROCEDURE — 85027 COMPLETE CBC AUTOMATED: CPT

## 2018-03-21 RX ORDER — SOTALOL HYDROCHLORIDE 80 MG/1
80 TABLET ORAL 2 TIMES DAILY
Qty: 60 TABLET | Refills: 3 | Status: SHIPPED | OUTPATIENT
Start: 2018-03-21 | End: 2018-04-19 | Stop reason: SDUPTHER

## 2018-03-21 RX ADMIN — ASPIRIN 81 MG CHEWABLE TABLET 81 MG: 81 TABLET CHEWABLE at 08:16

## 2018-03-21 RX ADMIN — APIXABAN 5 MG: 2.5 TABLET, FILM COATED ORAL at 08:16

## 2018-03-21 RX ADMIN — LEVOTHYROXINE SODIUM 125 MCG: 125 TABLET ORAL at 06:30

## 2018-03-21 RX ADMIN — SOTALOL HYDROCHLORIDE 80 MG: 80 TABLET ORAL at 08:16

## 2018-03-21 RX ADMIN — BENZONATATE 100 MG: 100 CAPSULE ORAL at 08:16

## 2018-03-21 RX ADMIN — Medication 10 ML: at 08:17

## 2018-03-21 RX ADMIN — FLUTICASONE PROPIONATE 1 SPRAY: 50 SPRAY, METERED NASAL at 08:18

## 2018-03-21 RX ADMIN — CETIRIZINE HYDROCHLORIDE 10 MG: 10 TABLET, FILM COATED ORAL at 08:16

## 2018-03-21 RX ADMIN — LOSARTAN POTASSIUM 50 MG: 50 TABLET ORAL at 08:16

## 2018-03-22 LAB
EKG P AXIS: 40 DEGREES
EKG P AXIS: 54 DEGREES
EKG P-R INTERVAL: 150 MS
EKG P-R INTERVAL: 152 MS
EKG Q-T INTERVAL: 406 MS
EKG Q-T INTERVAL: 434 MS
EKG QRS DURATION: 94 MS
EKG QRS DURATION: 94 MS
EKG QTC CALCULATION (BAZETT): 398 MS
EKG QTC CALCULATION (BAZETT): 408 MS
EKG T AXIS: 45 DEGREES
EKG T AXIS: 59 DEGREES

## 2018-03-23 ENCOUNTER — HOSPITAL ENCOUNTER (EMERGENCY)
Age: 63
Discharge: HOME | End: 2018-03-24
Attending: EMERGENCY MEDICINE
Payer: COMMERCIAL

## 2018-03-23 DIAGNOSIS — E03.9 HYPOTHYROIDISM, UNSPECIFIED TYPE: ICD-10-CM

## 2018-03-23 DIAGNOSIS — I10 ESSENTIAL HYPERTENSION: Primary | ICD-10-CM

## 2018-03-23 PROCEDURE — 99284 EMERGENCY DEPT VISIT MOD MDM: CPT

## 2018-03-23 PROCEDURE — 36569 INSJ PICC 5 YR+ W/O IMAGING: CPT

## 2018-03-23 ASSESSMENT — PAIN SCALES - GENERAL: PAINLEVEL_OUTOF10: 1

## 2018-03-23 ASSESSMENT — PAIN DESCRIPTION - LOCATION: LOCATION: HEAD

## 2018-03-23 ASSESSMENT — PAIN DESCRIPTION - DESCRIPTORS: DESCRIPTORS: ACHING

## 2018-03-23 ASSESSMENT — PAIN DESCRIPTION - ORIENTATION: ORIENTATION: POSTERIOR;LOWER

## 2018-03-23 ASSESSMENT — PAIN DESCRIPTION - PAIN TYPE: TYPE: ACUTE PAIN

## 2018-03-24 ENCOUNTER — APPOINTMENT (OUTPATIENT)
Dept: GENERAL RADIOLOGY | Age: 63
End: 2018-03-24
Payer: COMMERCIAL

## 2018-03-24 VITALS
OXYGEN SATURATION: 96 % | RESPIRATION RATE: 17 BRPM | BODY MASS INDEX: 31.34 KG/M2 | TEMPERATURE: 97.7 F | DIASTOLIC BLOOD PRESSURE: 83 MMHG | HEIGHT: 66 IN | WEIGHT: 195 LBS | HEART RATE: 46 BPM | SYSTOLIC BLOOD PRESSURE: 157 MMHG

## 2018-03-24 LAB
ALBUMIN SERPL-MCNC: 3.8 G/DL (ref 3.5–5.2)
ALP BLD-CCNC: 99 U/L (ref 35–104)
ALT SERPL-CCNC: 11 U/L (ref 5–33)
ANION GAP SERPL CALCULATED.3IONS-SCNC: 12 MMOL/L (ref 7–19)
AST SERPL-CCNC: 14 U/L (ref 5–32)
BACTERIA: NEGATIVE /HPF
BASOPHILS ABSOLUTE: 0 K/UL (ref 0–0.2)
BASOPHILS RELATIVE PERCENT: 0.5 % (ref 0–1)
BILIRUB SERPL-MCNC: 0.3 MG/DL (ref 0.2–1.2)
BILIRUBIN URINE: NEGATIVE
BLOOD, URINE: ABNORMAL
BUN BLDV-MCNC: 19 MG/DL (ref 8–23)
CALCIUM SERPL-MCNC: 9.4 MG/DL (ref 8.8–10.2)
CHLORIDE BLD-SCNC: 101 MMOL/L (ref 98–111)
CLARITY: CLEAR
CO2: 24 MMOL/L (ref 22–29)
COLOR: YELLOW
CREAT SERPL-MCNC: 0.5 MG/DL (ref 0.5–0.9)
EOSINOPHILS ABSOLUTE: 0.1 K/UL (ref 0–0.6)
EOSINOPHILS RELATIVE PERCENT: 1.6 % (ref 0–5)
EPITHELIAL CELLS, UA: 1 /HPF (ref 0–5)
GFR NON-AFRICAN AMERICAN: >60
GLUCOSE BLD-MCNC: 100 MG/DL (ref 74–109)
GLUCOSE URINE: NEGATIVE MG/DL
HCT VFR BLD CALC: 39.3 % (ref 37–47)
HEMOGLOBIN: 12.9 G/DL (ref 12–16)
HYALINE CASTS: 0 /HPF (ref 0–8)
KETONES, URINE: NEGATIVE MG/DL
LEUKOCYTE ESTERASE, URINE: ABNORMAL
LYMPHOCYTES ABSOLUTE: 2.5 K/UL (ref 1.1–4.5)
LYMPHOCYTES RELATIVE PERCENT: 32.2 % (ref 20–40)
MCH RBC QN AUTO: 30.2 PG (ref 27–31)
MCHC RBC AUTO-ENTMCNC: 32.8 G/DL (ref 33–37)
MCV RBC AUTO: 92 FL (ref 81–99)
MONOCYTES ABSOLUTE: 0.8 K/UL (ref 0–0.9)
MONOCYTES RELATIVE PERCENT: 10.2 % (ref 0–10)
NEUTROPHILS ABSOLUTE: 4.3 K/UL (ref 1.5–7.5)
NEUTROPHILS RELATIVE PERCENT: 54.9 % (ref 50–65)
NITRITE, URINE: NEGATIVE
PDW BLD-RTO: 12.7 % (ref 11.5–14.5)
PH UA: 7
PLATELET # BLD: 213 K/UL (ref 130–400)
PMV BLD AUTO: 9.8 FL (ref 9.4–12.3)
POTASSIUM SERPL-SCNC: 3.5 MMOL/L (ref 3.5–5)
PROTEIN UA: NEGATIVE MG/DL
RBC # BLD: 4.27 M/UL (ref 4.2–5.4)
RBC UA: 16 /HPF (ref 0–4)
SODIUM BLD-SCNC: 137 MMOL/L (ref 136–145)
SPECIFIC GRAVITY UA: 1.01
T4 FREE: 1.3 NG/DL (ref 0.9–1.7)
TOTAL PROTEIN: 7.4 G/DL (ref 6.6–8.7)
TSH SERPL DL<=0.05 MIU/L-ACNC: 37.77 UIU/ML (ref 0.27–4.2)
UROBILINOGEN, URINE: 0.2 E.U./DL
WBC # BLD: 7.9 K/UL (ref 4.8–10.8)
WBC UA: 1 /HPF (ref 0–5)

## 2018-03-24 PROCEDURE — 99285 EMERGENCY DEPT VISIT HI MDM: CPT | Performed by: EMERGENCY MEDICINE

## 2018-03-24 PROCEDURE — 36415 COLL VENOUS BLD VENIPUNCTURE: CPT

## 2018-03-24 PROCEDURE — 80053 COMPREHEN METABOLIC PANEL: CPT

## 2018-03-24 PROCEDURE — 71046 X-RAY EXAM CHEST 2 VIEWS: CPT

## 2018-03-24 PROCEDURE — 85025 COMPLETE CBC W/AUTO DIFF WBC: CPT

## 2018-03-24 PROCEDURE — 81001 URINALYSIS AUTO W/SCOPE: CPT

## 2018-03-24 PROCEDURE — 84443 ASSAY THYROID STIM HORMONE: CPT

## 2018-03-24 PROCEDURE — 84439 ASSAY OF FREE THYROXINE: CPT

## 2018-03-24 ASSESSMENT — ENCOUNTER SYMPTOMS
PHOTOPHOBIA: 0
SHORTNESS OF BREATH: 0
CHEST TIGHTNESS: 0
SORE THROAT: 0
ABDOMINAL PAIN: 0
NAUSEA: 0
ABDOMINAL DISTENTION: 0
BACK PAIN: 0
WHEEZING: 0
CONSTIPATION: 0
TROUBLE SWALLOWING: 0
VOMITING: 0
COUGH: 0
COLOR CHANGE: 0
RHINORRHEA: 0
DIARRHEA: 0
EYE PAIN: 0

## 2018-03-24 NOTE — ED PROVIDER NOTES
[facility] EMERGENCY DEPT  eMERGENCY dEPARTMENT eNCOUnter      Pt Name: Steve Zhao  MRN: [id number]  [place] 1955  Date of evaluation: 3/23/2018  Provider: Conda Rinne, MD    CHIEF COMPLAINT       Chief Complaint   Patient presents with    Hypertension     States it was 197/116. States she has a headache. Was recently discharged due to a-fib. HISTORY OF PRESENT ILLNESS   (Location/Symptom, Timing/Onset, Context/Setting, Quality, Duration, Modifying Factors, Severity)  Note limiting factors. Steve Zhao is a 60 y.o. female who presents to the emergency department for elevated BP. Patient is a unknown hypertensive and is on no medication. She states that this evening after getting home from a baby shower she was experiencing slight headache. She took her blood pressure noticed it was elevated. She took an extra dose of her Cozaar, as per her PCP, to help with the increased pressure. She was not experiencing any shortness of breath, chest pain, slurred speech or vision change associated with the elevated blood pressure. HPI    Nursing Notes were reviewed. REVIEW OF SYSTEMS    (2-9 systems for level 4, 10 or more for level 5)     Review of Systems   Constitutional: Negative for activity change, appetite change, chills, fatigue and fever. HENT: Negative for congestion, ear pain, rhinorrhea, sore throat and trouble swallowing. Eyes: Negative for photophobia, pain and visual disturbance. Respiratory: Negative for cough, chest tightness, shortness of breath and wheezing. Cardiovascular: Negative for chest pain, palpitations and leg swelling. Gastrointestinal: Negative for abdominal distention, abdominal pain, constipation, diarrhea, nausea and vomiting. Genitourinary: Negative for difficulty urinating, dysuria, flank pain, urgency, vaginal bleeding and vaginal discharge. Musculoskeletal: Negative for back pain, myalgias and neck stiffness.    Skin: Negative for color change, pallor

## 2018-03-26 LAB
EKG P AXIS: 54 DEGREES
EKG P-R INTERVAL: 156 MS
EKG Q-T INTERVAL: 504 MS
EKG QRS DURATION: 96 MS
EKG QTC CALCULATION (BAZETT): 473 MS
EKG T AXIS: 29 DEGREES

## 2018-04-19 RX ORDER — SOTALOL HYDROCHLORIDE 80 MG/1
80 TABLET ORAL 2 TIMES DAILY
Qty: 60 TABLET | Refills: 5 | Status: SHIPPED | OUTPATIENT
Start: 2018-04-19 | End: 2018-04-23 | Stop reason: DRUGHIGH

## 2018-04-23 ENCOUNTER — OFFICE VISIT (OUTPATIENT)
Dept: CARDIOLOGY | Age: 63
End: 2018-04-23
Payer: COMMERCIAL

## 2018-04-23 VITALS
BODY MASS INDEX: 32.21 KG/M2 | WEIGHT: 200.4 LBS | DIASTOLIC BLOOD PRESSURE: 62 MMHG | HEIGHT: 66 IN | HEART RATE: 56 BPM | SYSTOLIC BLOOD PRESSURE: 138 MMHG

## 2018-04-23 DIAGNOSIS — I10 ESSENTIAL HYPERTENSION: ICD-10-CM

## 2018-04-23 DIAGNOSIS — I48.91 ATRIAL FIBRILLATION WITH RVR (HCC): Primary | ICD-10-CM

## 2018-04-23 PROCEDURE — 99213 OFFICE O/P EST LOW 20 MIN: CPT | Performed by: NURSE PRACTITIONER

## 2018-04-23 PROCEDURE — 93000 ELECTROCARDIOGRAM COMPLETE: CPT | Performed by: NURSE PRACTITIONER

## 2018-04-23 RX ORDER — SOTALOL HYDROCHLORIDE 80 MG/1
40 TABLET ORAL 2 TIMES DAILY
Qty: 60 TABLET | Refills: 2 | Status: SHIPPED | OUTPATIENT
Start: 2018-04-23 | End: 2018-10-08 | Stop reason: SDUPTHER

## 2018-05-30 ENCOUNTER — OFFICE VISIT (OUTPATIENT)
Dept: CARDIOLOGY | Age: 63
End: 2018-05-30
Payer: COMMERCIAL

## 2018-05-30 ENCOUNTER — HOSPITAL ENCOUNTER (OUTPATIENT)
Dept: NON INVASIVE DIAGNOSTICS | Age: 63
Discharge: HOME | End: 2018-05-30
Payer: COMMERCIAL

## 2018-05-30 VITALS
BODY MASS INDEX: 33.99 KG/M2 | SYSTOLIC BLOOD PRESSURE: 160 MMHG | HEIGHT: 65 IN | HEART RATE: 54 BPM | DIASTOLIC BLOOD PRESSURE: 88 MMHG | WEIGHT: 204 LBS

## 2018-05-30 DIAGNOSIS — I48.91 ATRIAL FIBRILLATION WITH RVR (HCC): Primary | ICD-10-CM

## 2018-05-30 DIAGNOSIS — I48.91 ATRIAL FIBRILLATION WITH RVR (HCC): ICD-10-CM

## 2018-05-30 DIAGNOSIS — I48.91 ATRIAL FIBRILLATION, UNSPECIFIED TYPE (HCC): ICD-10-CM

## 2018-05-30 PROCEDURE — 99214 OFFICE O/P EST MOD 30 MIN: CPT | Performed by: INTERNAL MEDICINE

## 2018-05-30 PROCEDURE — 93227 XTRNL ECG REC<48 HR R&I: CPT | Performed by: INTERNAL MEDICINE

## 2018-05-30 PROCEDURE — 93225 XTRNL ECG REC<48 HRS REC: CPT

## 2018-05-30 PROCEDURE — 93226 XTRNL ECG REC<48 HR SCAN A/R: CPT

## 2018-05-30 PROCEDURE — 93000 ELECTROCARDIOGRAM COMPLETE: CPT | Performed by: INTERNAL MEDICINE

## 2018-06-04 ASSESSMENT — ENCOUNTER SYMPTOMS: SHORTNESS OF BREATH: 0

## 2018-10-08 RX ORDER — SOTALOL HYDROCHLORIDE 80 MG/1
40 TABLET ORAL 2 TIMES DAILY
Qty: 30 TABLET | Refills: 5 | Status: SHIPPED | OUTPATIENT
Start: 2018-10-08 | End: 2019-03-28 | Stop reason: SDUPTHER

## 2018-10-22 RX ORDER — LOSARTAN POTASSIUM 50 MG/1
50 TABLET ORAL EVERY MORNING
Qty: 30 TABLET | Refills: 5 | Status: SHIPPED | OUTPATIENT
Start: 2018-10-22 | End: 2019-07-15

## 2018-12-13 ENCOUNTER — OFFICE VISIT (OUTPATIENT)
Dept: CARDIOLOGY | Age: 63
End: 2018-12-13
Payer: COMMERCIAL

## 2018-12-13 VITALS
SYSTOLIC BLOOD PRESSURE: 148 MMHG | HEART RATE: 60 BPM | HEIGHT: 65 IN | BODY MASS INDEX: 34.82 KG/M2 | DIASTOLIC BLOOD PRESSURE: 86 MMHG | WEIGHT: 209 LBS

## 2018-12-13 DIAGNOSIS — I48.91 ATRIAL FIBRILLATION WITH RVR (HCC): Primary | ICD-10-CM

## 2018-12-13 DIAGNOSIS — I10 ESSENTIAL HYPERTENSION: ICD-10-CM

## 2018-12-13 PROCEDURE — 99213 OFFICE O/P EST LOW 20 MIN: CPT | Performed by: NURSE PRACTITIONER

## 2018-12-13 PROCEDURE — 93000 ELECTROCARDIOGRAM COMPLETE: CPT | Performed by: NURSE PRACTITIONER

## 2018-12-13 NOTE — PROGRESS NOTES
Laterality Date    ABDOMEN SURGERY      CARDIAC CATHETERIZATION      COLONOSCOPY      CYSTOSCOPY      HYSTERECTOMY         Family History   Problem Relation Age of Onset    Heart Attack Mother     High Blood Pressure Father     Pacemaker Father     Diabetes Sister     High Blood Pressure Sister        Social History     Social History    Marital status:      Spouse name: N/A    Number of children: N/A    Years of education: N/A     Occupational History    Not on file. Social History Main Topics    Smoking status: Never Smoker    Smokeless tobacco: Never Used    Alcohol use No    Drug use: No    Sexual activity: Not on file     Other Topics Concern    Not on file     Social History Narrative    No narrative on file       No Known Allergies      Current Outpatient Prescriptions:     losartan (COZAAR) 50 MG tablet, Take 1 tablet by mouth every morning (Patient taking differently: Take 75 mg by mouth 2 times daily Take one 50 mg tablet in the am and one-half tablet (25mg) at night), Disp: 30 tablet, Rfl: 5    sotalol (BETAPACE) 80 MG tablet, Take 0.5 tablets by mouth 2 times daily, Disp: 30 tablet, Rfl: 5    apixaban (ELIQUIS) 5 MG TABS tablet, Take 1 tablet by mouth 2 times daily, Disp: 60 tablet, Rfl: 2    Omega-3 Fatty Acids (FISH OIL) 1000 MG CAPS, Take 2,000 mg by mouth 2 times daily , Disp: , Rfl:     levothyroxine (SYNTHROID) 125 MCG tablet, Take 125 mcg by mouth Daily, Disp: , Rfl:     PE:  Vitals:    12/13/18 1512   BP: (!) 148/86   Pulse: 60       Estimated body mass index is 34.78 kg/m² as calculated from the following:    Height as of this encounter: 5' 5\" (1.651 m). Weight as of this encounter: 209 lb (94.8 kg). Constitutional: She is oriented to person, place, and time. She appears well-developed and well-nourished in no acute distress. Head: Normocephalic and atraumatic. Neck:  Neck supple without JVD present.    Cardiovascular: Normal rate, regular rhythm,

## 2019-02-28 ENCOUNTER — TELEPHONE (OUTPATIENT)
Dept: CARDIOLOGY | Age: 64
End: 2019-02-28

## 2019-03-28 RX ORDER — SOTALOL HYDROCHLORIDE 80 MG/1
TABLET ORAL
Qty: 90 TABLET | Refills: 3 | Status: SHIPPED | OUTPATIENT
Start: 2019-03-28 | End: 2020-03-16 | Stop reason: SDUPTHER

## 2019-07-15 ENCOUNTER — OFFICE VISIT (OUTPATIENT)
Dept: CARDIOLOGY | Age: 64
End: 2019-07-15
Payer: MEDICARE

## 2019-07-15 VITALS
BODY MASS INDEX: 34.55 KG/M2 | SYSTOLIC BLOOD PRESSURE: 140 MMHG | DIASTOLIC BLOOD PRESSURE: 80 MMHG | WEIGHT: 215 LBS | HEIGHT: 66 IN | HEART RATE: 50 BPM

## 2019-07-15 DIAGNOSIS — I10 ESSENTIAL HYPERTENSION: ICD-10-CM

## 2019-07-15 DIAGNOSIS — I48.91 ATRIAL FIBRILLATION WITH RVR (HCC): Primary | ICD-10-CM

## 2019-07-15 PROCEDURE — 99213 OFFICE O/P EST LOW 20 MIN: CPT | Performed by: INTERNAL MEDICINE

## 2019-07-15 PROCEDURE — 93000 ELECTROCARDIOGRAM COMPLETE: CPT | Performed by: INTERNAL MEDICINE

## 2019-07-15 RX ORDER — SPIRONOLACTONE 25 MG/1
25 TABLET ORAL DAILY
COMMUNITY

## 2019-07-15 RX ORDER — LOSARTAN POTASSIUM 50 MG/1
25 TABLET ORAL EVERY MORNING
Qty: 30 TABLET | Refills: 3
Start: 2019-07-15 | End: 2020-01-27 | Stop reason: ALTCHOICE

## 2019-11-14 ENCOUNTER — HOSPITAL ENCOUNTER (EMERGENCY)
Age: 64
Discharge: HOME | End: 2019-11-14
Payer: MEDICARE

## 2019-11-14 VITALS
OXYGEN SATURATION: 98 % | TEMPERATURE: 98 F | DIASTOLIC BLOOD PRESSURE: 86 MMHG | HEART RATE: 56 BPM | RESPIRATION RATE: 20 BRPM | SYSTOLIC BLOOD PRESSURE: 156 MMHG

## 2019-11-14 DIAGNOSIS — R31.29 OTHER MICROSCOPIC HEMATURIA: ICD-10-CM

## 2019-11-14 DIAGNOSIS — I10 ESSENTIAL HYPERTENSION: Primary | ICD-10-CM

## 2019-11-14 LAB
ALBUMIN SERPL-MCNC: 4.4 G/DL (ref 3.5–5.2)
ALP BLD-CCNC: 115 U/L (ref 35–104)
ALT SERPL-CCNC: 22 U/L (ref 5–33)
ANION GAP SERPL CALCULATED.3IONS-SCNC: 11 MMOL/L (ref 7–19)
AST SERPL-CCNC: 19 U/L (ref 5–32)
BACTERIA: NEGATIVE /HPF
BASOPHILS ABSOLUTE: 0 K/UL (ref 0–0.2)
BASOPHILS RELATIVE PERCENT: 0.3 % (ref 0–1)
BILIRUB SERPL-MCNC: <0.2 MG/DL (ref 0.2–1.2)
BILIRUBIN URINE: NEGATIVE
BLOOD, URINE: ABNORMAL
BUN BLDV-MCNC: 15 MG/DL (ref 8–23)
CALCIUM SERPL-MCNC: 9.6 MG/DL (ref 8.8–10.2)
CHLORIDE BLD-SCNC: 103 MMOL/L (ref 98–111)
CLARITY: CLEAR
CO2: 24 MMOL/L (ref 22–29)
COLOR: YELLOW
CREAT SERPL-MCNC: 0.5 MG/DL (ref 0.5–0.9)
EKG P AXIS: 55 DEGREES
EKG P-R INTERVAL: 160 MS
EKG Q-T INTERVAL: 454 MS
EKG QRS DURATION: 102 MS
EKG QTC CALCULATION (BAZETT): 437 MS
EKG T AXIS: 26 DEGREES
EOSINOPHILS ABSOLUTE: 0.1 K/UL (ref 0–0.6)
EOSINOPHILS RELATIVE PERCENT: 1.8 % (ref 0–5)
EPITHELIAL CELLS, UA: 2 /HPF (ref 0–5)
GFR NON-AFRICAN AMERICAN: >60
GLUCOSE BLD-MCNC: 117 MG/DL (ref 74–109)
GLUCOSE URINE: NEGATIVE MG/DL
HCT VFR BLD CALC: 41.3 % (ref 37–47)
HEMOGLOBIN: 13.7 G/DL (ref 12–16)
HYALINE CASTS: 0 /HPF (ref 0–8)
IMMATURE GRANULOCYTES #: 0 K/UL
INR BLD: 1.13 (ref 0.88–1.18)
KETONES, URINE: NEGATIVE MG/DL
LEUKOCYTE ESTERASE, URINE: ABNORMAL
LYMPHOCYTES ABSOLUTE: 2.7 K/UL (ref 1.1–4.5)
LYMPHOCYTES RELATIVE PERCENT: 37.8 % (ref 20–40)
MCH RBC QN AUTO: 31.1 PG (ref 27–31)
MCHC RBC AUTO-ENTMCNC: 33.2 G/DL (ref 33–37)
MCV RBC AUTO: 93.7 FL (ref 81–99)
MONOCYTES ABSOLUTE: 0.8 K/UL (ref 0–0.9)
MONOCYTES RELATIVE PERCENT: 11.7 % (ref 0–10)
NEUTROPHILS ABSOLUTE: 3.4 K/UL (ref 1.5–7.5)
NEUTROPHILS RELATIVE PERCENT: 48.1 % (ref 50–65)
NITRITE, URINE: NEGATIVE
PDW BLD-RTO: 12.3 % (ref 11.5–14.5)
PH UA: 7 (ref 5–8)
PLATELET # BLD: 195 K/UL (ref 130–400)
PMV BLD AUTO: 10.1 FL (ref 9.4–12.3)
POTASSIUM SERPL-SCNC: 3.8 MMOL/L (ref 3.5–5)
PROTEIN UA: NEGATIVE MG/DL
PROTHROMBIN TIME: 13.9 SEC (ref 12–14.6)
RBC # BLD: 4.41 M/UL (ref 4.2–5.4)
RBC UA: 21 /HPF (ref 0–4)
SODIUM BLD-SCNC: 138 MMOL/L (ref 136–145)
SPECIFIC GRAVITY UA: 1.01 (ref 1–1.03)
TOTAL PROTEIN: 7.6 G/DL (ref 6.6–8.7)
TROPONIN: <0.01 NG/ML (ref 0–0.03)
URINE REFLEX TO CULTURE: YES
UROBILINOGEN, URINE: 0.2 E.U./DL
WBC # BLD: 7 K/UL (ref 4.8–10.8)
WBC UA: 5 /HPF (ref 0–5)

## 2019-11-14 PROCEDURE — 81001 URINALYSIS AUTO W/SCOPE: CPT

## 2019-11-14 PROCEDURE — 87086 URINE CULTURE/COLONY COUNT: CPT

## 2019-11-14 PROCEDURE — 84484 ASSAY OF TROPONIN QUANT: CPT

## 2019-11-14 PROCEDURE — 85025 COMPLETE CBC W/AUTO DIFF WBC: CPT

## 2019-11-14 PROCEDURE — 80053 COMPREHEN METABOLIC PANEL: CPT

## 2019-11-14 PROCEDURE — 93005 ELECTROCARDIOGRAM TRACING: CPT | Performed by: NURSE PRACTITIONER

## 2019-11-14 PROCEDURE — 99283 EMERGENCY DEPT VISIT LOW MDM: CPT

## 2019-11-14 PROCEDURE — 36415 COLL VENOUS BLD VENIPUNCTURE: CPT

## 2019-11-14 PROCEDURE — 85610 PROTHROMBIN TIME: CPT

## 2019-11-14 PROCEDURE — 6370000000 HC RX 637 (ALT 250 FOR IP): Performed by: NURSE PRACTITIONER

## 2019-11-14 RX ORDER — CLONIDINE HYDROCHLORIDE 0.1 MG/1
0.1 TABLET ORAL 2 TIMES DAILY PRN
Qty: 20 TABLET | Refills: 0 | Status: SHIPPED | OUTPATIENT
Start: 2019-11-14 | End: 2021-09-25

## 2019-11-14 RX ORDER — CLONIDINE HYDROCHLORIDE 0.1 MG/1
0.1 TABLET ORAL ONCE
Status: COMPLETED | OUTPATIENT
Start: 2019-11-14 | End: 2019-11-14

## 2019-11-14 RX ORDER — CALCIUM CARBONATE 500(1250)
500 TABLET ORAL DAILY
COMMUNITY

## 2019-11-14 RX ORDER — NICOTINE POLACRILEX 2 MG
GUM BUCCAL
COMMUNITY

## 2019-11-14 RX ADMIN — CLONIDINE HYDROCHLORIDE 0.1 MG: 0.1 TABLET ORAL at 00:54

## 2019-11-14 ASSESSMENT — ENCOUNTER SYMPTOMS: VOMITING: 0

## 2019-11-16 LAB
ORGANISM: ABNORMAL
URINE CULTURE, ROUTINE: ABNORMAL
URINE CULTURE, ROUTINE: ABNORMAL

## 2020-01-27 ENCOUNTER — OFFICE VISIT (OUTPATIENT)
Dept: CARDIOLOGY | Age: 65
End: 2020-01-27
Payer: MEDICARE

## 2020-01-27 VITALS
HEART RATE: 58 BPM | WEIGHT: 214 LBS | SYSTOLIC BLOOD PRESSURE: 144 MMHG | BODY MASS INDEX: 35.65 KG/M2 | HEIGHT: 65 IN | DIASTOLIC BLOOD PRESSURE: 84 MMHG

## 2020-01-27 PROCEDURE — 3017F COLORECTAL CA SCREEN DOC REV: CPT | Performed by: INTERNAL MEDICINE

## 2020-01-27 PROCEDURE — G8400 PT W/DXA NO RESULTS DOC: HCPCS | Performed by: INTERNAL MEDICINE

## 2020-01-27 PROCEDURE — 93000 ELECTROCARDIOGRAM COMPLETE: CPT | Performed by: INTERNAL MEDICINE

## 2020-01-27 PROCEDURE — 1123F ACP DISCUSS/DSCN MKR DOCD: CPT | Performed by: INTERNAL MEDICINE

## 2020-01-27 PROCEDURE — 1036F TOBACCO NON-USER: CPT | Performed by: INTERNAL MEDICINE

## 2020-01-27 PROCEDURE — G8427 DOCREV CUR MEDS BY ELIG CLIN: HCPCS | Performed by: INTERNAL MEDICINE

## 2020-01-27 PROCEDURE — 1090F PRES/ABSN URINE INCON ASSESS: CPT | Performed by: INTERNAL MEDICINE

## 2020-01-27 PROCEDURE — 4040F PNEUMOC VAC/ADMIN/RCVD: CPT | Performed by: INTERNAL MEDICINE

## 2020-01-27 PROCEDURE — 99213 OFFICE O/P EST LOW 20 MIN: CPT | Performed by: INTERNAL MEDICINE

## 2020-01-27 PROCEDURE — G8417 CALC BMI ABV UP PARAM F/U: HCPCS | Performed by: INTERNAL MEDICINE

## 2020-01-27 PROCEDURE — G8484 FLU IMMUNIZE NO ADMIN: HCPCS | Performed by: INTERNAL MEDICINE

## 2020-01-27 RX ORDER — OLMESARTAN MEDOXOMIL 40 MG/1
40 TABLET ORAL DAILY
COMMUNITY
Start: 2020-01-24 | End: 2020-07-29 | Stop reason: DRUGHIGH

## 2020-01-27 NOTE — PROGRESS NOTES
27-year-old lady with a history of sleep apnea, hypertension, LVH/grade 1 diastolic dysfunction, and paroxysmal atrial fibrillation returns for routine follow-up. She has done well lately with multiple recorded pressures most of which are in the 120s, occasionally in the 130s. She has had a few skipped beats but no symptoms suspicious for paroxysmal atrial fibrillation. Her losartan was changed to Benicar 40 mg daily in early December. She is active but gets no regular exercise and has no other cardiac symptoms. On exam she carries 214 pounds in a 5 foot 5 inch frame. Pressure is 144/84 with a pulse of 58. EOMs full, sclerae and conjunctiva normal. Normal dentition. No elevation of central venous pressure at 45 degrees. Carotid upstrokes normal without delay or bruit. Thyroid normal to palpation. Chest clear to auscultation without rales or prolonged expiratory phase. No skin lesions seen. PMI normal. S1, S2 normal without murmur or kane. Mild abdominal obesity. Normal bowel sounds without palpable mass or bruit. No significant lower extremity edema. Normal range of motion with normal gait. Alert, oriented x 3 and cognition normal as reflected by conversation. EKG reveals a sinus bradycardia at 58 with a pre-transitional Q wave precluding exclusion of the previous anterior infarct. [Unchanged from April 2018]    Assessment/plan:  1. Hypertension -appears to be under reasonable control most of the time with only 1 value in the 140s except for the office number today. Advised her that our target was consistently less than 130. Furthermore advised her that 30 minutes of exercise a day would drop pressure about 8 points. 2.  Paroxysmal atrial fibrillation -no symptomatic recurrence with anticoagulation and low-dose sotalol in place. QT interval acceptable. 3.  Dyslipidemia -July 2019 values LDL 78, HDL 47, triglycerides 131. Mildly depressed HDL otherwise acceptable.     Medical records reviewed

## 2020-03-11 ENCOUNTER — TELEPHONE (OUTPATIENT)
Dept: CARDIOLOGY | Age: 65
End: 2020-03-11

## 2020-03-11 NOTE — TELEPHONE ENCOUNTER
Per [facility] patients are to only have one time samples. 1 box given of eliquis. She will find through her insurance which other drug may be cheaper for her.

## 2020-03-16 RX ORDER — SOTALOL HYDROCHLORIDE 80 MG/1
TABLET ORAL
Qty: 90 TABLET | Refills: 3 | Status: SHIPPED | OUTPATIENT
Start: 2020-03-16 | End: 2021-03-15

## 2020-04-14 ENCOUNTER — TELEPHONE (OUTPATIENT)
Dept: CARDIOLOGY | Age: 65
End: 2020-04-14

## 2020-04-14 NOTE — TELEPHONE ENCOUNTER
Emerita requests that nurse  return their call. The best time to reach her is Anytime. Patient needs to get some sample of her medication Eliquis,please call the patient. Thank you.

## 2020-04-14 NOTE — TELEPHONE ENCOUNTER
Spoke to patient and advised her of the office not having samples. I have sent in a refill of her eliquis to [facility].

## 2020-07-29 ENCOUNTER — OFFICE VISIT (OUTPATIENT)
Dept: CARDIOLOGY | Age: 65
End: 2020-07-29
Payer: MEDICARE

## 2020-07-29 VITALS
HEIGHT: 66 IN | HEART RATE: 55 BPM | WEIGHT: 213 LBS | SYSTOLIC BLOOD PRESSURE: 138 MMHG | BODY MASS INDEX: 34.23 KG/M2 | DIASTOLIC BLOOD PRESSURE: 78 MMHG | OXYGEN SATURATION: 98 %

## 2020-07-29 PROBLEM — I48.0 PAF (PAROXYSMAL ATRIAL FIBRILLATION) (HCC): Status: ACTIVE | Noted: 2017-05-25

## 2020-07-29 PROBLEM — Z79.01 CHRONIC ANTICOAGULATION: Status: ACTIVE | Noted: 2020-07-29

## 2020-07-29 PROCEDURE — G8400 PT W/DXA NO RESULTS DOC: HCPCS | Performed by: NURSE PRACTITIONER

## 2020-07-29 PROCEDURE — 1090F PRES/ABSN URINE INCON ASSESS: CPT | Performed by: NURSE PRACTITIONER

## 2020-07-29 PROCEDURE — 1123F ACP DISCUSS/DSCN MKR DOCD: CPT | Performed by: NURSE PRACTITIONER

## 2020-07-29 PROCEDURE — 1036F TOBACCO NON-USER: CPT | Performed by: NURSE PRACTITIONER

## 2020-07-29 PROCEDURE — 3017F COLORECTAL CA SCREEN DOC REV: CPT | Performed by: NURSE PRACTITIONER

## 2020-07-29 PROCEDURE — 4040F PNEUMOC VAC/ADMIN/RCVD: CPT | Performed by: NURSE PRACTITIONER

## 2020-07-29 PROCEDURE — 99213 OFFICE O/P EST LOW 20 MIN: CPT | Performed by: NURSE PRACTITIONER

## 2020-07-29 PROCEDURE — G8417 CALC BMI ABV UP PARAM F/U: HCPCS | Performed by: NURSE PRACTITIONER

## 2020-07-29 PROCEDURE — G8427 DOCREV CUR MEDS BY ELIG CLIN: HCPCS | Performed by: NURSE PRACTITIONER

## 2020-07-29 RX ORDER — OLMESARTAN MEDOXOMIL 40 MG/1
40 TABLET ORAL DAILY
COMMUNITY
End: 2020-07-29

## 2020-07-29 RX ORDER — OLMESARTAN MEDOXOMIL 20 MG/1
20 TABLET, FILM COATED ORAL DAILY
Qty: 90 TABLET | Refills: 3 | Status: SHIPPED | OUTPATIENT
Start: 2020-07-29 | End: 2020-07-30

## 2020-07-29 NOTE — PROGRESS NOTES
[facility] [place], [place]. [address], [place] [address], [address] [facility]  [phone] office  [phone] fax      OFFICE VISIT:  2020    Emerita Orourke - : 1955  Reason For Visit:  Loki Ojeda is a 71 y.o. female who is here for Follow-up (\"thinks rare afib\")    History:   Diagnosis Orders   1. PAF (paroxysmal atrial fibrillation) ([facility]     2. Essential hypertension     3. Chronic anticoagulation         The patient presents today for cardiology follow up. Patient reports BP running low at times so she has been breaking Benicar 40 mg into 1/3 and taking daily. The patient denies symptoms to suggest myocardial ischemia, heart failure or arrhythmia. The patient's PCP monitors cholesterol. Home BP log reviewed with patient. Subjective  Emerita denies exertional chest pain, shortness of breath, orthopnea, paroxysmal nocturnal dyspnea, syncope, presyncope, sensed arrhythmia, edema and fatigue. The patient denies numbness or weakness to suggest cerebrovascular accident or transient ischemic attack.       Maira Long has the following history as recorded in [facility]:  Patient Active Problem List   Diagnosis Code    A-fib ([facility] [address] I48.91    Abnormal stress echo R94.39    Precordial pain R07.2    Atrial fibrillation with RVR ([facility]) I48.91    Essential hypertension I10    Rhinorrhea J34.89    Chronic anticoagulation Z79.01     Past Medical History:   Diagnosis Date    Atrial fibrillation ([facility] [address]     Disease of blood and blood forming organ     DVT in right leg     Hypertension     Hyperthyroidism     Sleep apnea     Umbilical hernia      Past Surgical History:   Procedure Laterality Date    ABDOMEN SURGERY      CARDIAC CATHETERIZATION      COLONOSCOPY      CYSTOSCOPY      HYSTERECTOMY       Family History   Problem Relation Age of Onset    Heart Attack Mother     High Blood Pressure Father    Irvin Maddox Pacemaker Father     Diabetes Sister  High Blood Pressure Sister      Social History     Tobacco Use    Smoking status: Never Smoker    Smokeless tobacco: Never Used   Substance Use Topics    Alcohol use: No      Current Outpatient Medications   Medication Sig Dispense Refill    olmesartan (BENICAR) 40 MG tablet Take 40 mg by mouth daily Indications: patient only takes 1/3 of tablet a day      OMEGA-3 FATTY ACIDS PO Take 1,000 mg by mouth 2 times daily      apixaban (ELIQUIS) 5 MG TABS tablet Take 1 tablet by mouth 2 times daily 60 tablet 3    sotalol (BETAPACE) 80 MG tablet TAKE 1/2 (ONE-HALF) TABLET BY MOUTH TWICE DAILY 90 tablet 3    calcium carbonate (OSCAL) 500 MG TABS tablet Take 500 mg by mouth daily      Biotin 1 MG CAPS Take by mouth      cloNIDine (CATAPRES) 0.1 MG tablet Take 1 tablet by mouth 2 times daily as needed for High Blood Pressure (for blood pressure greater than 180/90) 20 tablet 0    spironolactone (ALDACTONE) 25 MG tablet Take 25 mg by mouth daily      levothyroxine (SYNTHROID) 125 MCG tablet Take 125 mcg by mouth Daily      olmesartan (BENICAR) 40 MG tablet Take 40 mg by mouth daily      Omega-3 Fatty Acids (FISH OIL) 1000 MG CAPS Take 2,000 mg by mouth 2 times daily        No current facility-administered medications for this visit. Allergies: Patient has no known allergies. Review of Systems  Constitutional - no appetite change, or unexpected weight change. No fever, chills or diaphoresis. No significant change in activity level or new onset of fatigue. HEENT - no significant rhinorrhea or epistaxis. No tinnitus or significant hearing loss. Eyes - no sudden vision change or amaurosis. No corneal arcus, xantholasma, subconjunctival hemorrhage or discharge. Respiratory - no significant wheezing, stridor, apnea or cough. No dyspnea on exertion or shortness of air. Cardiovascular - no exertional chest pain to suggest myocardial ischemia. No orthopnea or PND.   No sensation of sustained assistance. Skin -  Warm and dry; no rash or pallor. No new surgical wound. Neurological - No focal neurological deficits. Thought processes coherent. No apparent tremor. Oriented to person, place and time. Psychiatric -  Appropriate affect and mood. Assessment:     Diagnosis Orders   1. PAF (paroxysmal atrial fibrillation) ([address]     2. Essential hypertension     3. Chronic anticoagulation       Data reviewed:  3/18/18 echo    Conclusions    Summary   1. Moderate concentric LVH   2. Normal LV systolic function (estimated LV EF 60-65%)   3. Grade I diastolic dysfunction (impaired relaxation) with normal left   atrial pressure   4. Mild mitral regurgitation   5. Mild tricuspid regurgitation; estimated RVSP of at least 31 mmHg    Signature    ----------------------------------------------------------------   Electronically signed by Socrates Perez MD(Interpreting   physician) on 03/18/2018 04:51 PM    Lab Results   Component Value Date     11/14/2019    K 3.8 11/14/2019     11/14/2019    CO2 24 11/14/2019    BUN 15 11/14/2019    CREATININE 0.5 11/14/2019    GLUCOSE 117 (H) 11/14/2019    CALCIUM 9.6 11/14/2019    PROT 7.6 11/14/2019    LABALBU 4.4 11/14/2019    BILITOT <0.2 11/14/2019    ALKPHOS 115 (H) 11/14/2019    AST 19 11/14/2019    ALT 22 11/14/2019    LABGLOM >60 11/14/2019     Stable CV status without overt heart failure, sensed arrhythmia or angina. PAF - no recurrent AF on Sotalol. No bleeding issue reported on Eliquis. HTN - BP running low. Reduced dose of Benicar from 40 to 20 mg. Advised to initially try 1/2 tab daily as she has been breaking the 40 mg tab into thirds and taking 1/3 daily. Advised to keep a BP log with goal of 130/80 or les. Patient is compliant with medication regimen.     BP Readings from Last 3 Encounters:   07/29/20 138/78   01/27/20 (!) 144/84   11/14/19 (!) 156/86    Pulse Readings from Last 3 Encounters:   07/29/20 55   01/27/20 58   11/14/19 56 Wt Readings from Last 3 Encounters:   07/29/20 213 lb (96.6 kg)   01/27/20 214 lb (97.1 kg)   07/15/19 215 lb (97.5 kg)     Plan  Previous cardiac history and records reviewed. Continue current medical management. Decrease Benicar to 20 mg 1/2 tab daily initially. Patient will log BP at home with goal of 130/80 or less. Continue other current medications as directed. Continue to follow up with primary care provider for non cardiac medical problems. Call the office with any problems, questions or concerns at [phone]. Cardiology follow up: as scheduled with Dr. Leonardo Hancock. Educational included in patient instructions. Heart health. Eliquis.      Chris Correa, APRN

## 2020-07-29 NOTE — PATIENT INSTRUCTIONS
New instructions for today:  New Benicar dose is 20 mg daily. Can try 1/2 tab daily. Log blood pressure at home. Goal is 130/80 or less. Eliquis can increase your risk of bleeding. If you notice blood in urine or stool, bleeding gums, excessive bruising or cough productive of bloody sputum, notify the office. Information on this blood thinner has been included in your after visit summary. Patient Instructions:  Continue current medications as prescribed. Always keep a current medication list. Bring your medications to every office visit. Continue to follow up with primary care provider for non cardiac medical problems. Call the office with any problems, questions or concerns at [phone]. If you have been asked to keep a blood pressure log, do so for 2 weeks. Call the office to report readings to the triage nurse at [phone]. Follow up with cardiologist as scheduled. The following educational material has been included in this after visit summary for your review: Life simple 7. Heart health. Eliquis. Life simple 7  1) Manage blood pressure - high blood pressure is a major risk factor for heart disease and stroke. Keeping blood pressure in health range reduces strain on your heart, arteries and kidneys. Blood pressure goal is less than 130/80. 2) Control cholesterol - contributes to plaque, which can clog arteries and lead to heart disease and stroke. When you control your cholesterol you are giving your arteries their best chance to remain clear. It is recommended that you get cholesterol lab work done once a year. 3) Reduce blood sugar - most of the food we eat is turning into glucose or blood sugar that our body uses for energy. Over time, high levels of blood sugar can damage your heart, kidneys, eyes and nerves. 4) Get active - living an active life is one of the most rewarding gifts you can give yourself and those you love.   Simply put, daily physical activity increases your length and quality of life. Strive to exercise 15 minutes most days of the week. 5)  Eat better - A healthy diet is one of your best weapons for fighting cardiovascular disease. When you eat a heart healthy diet, you improve your chances for feeling good and staying healthy for life. 6)  Lose weight - when you shed extra fat an unnecessary pounds, you reduce the burden on your hear, lungs, blood vessels and skeleton. You give yourself the gift of active living, you lower your blood pressure and help yourself feel better. 7) Stop smoking - cigarette smokers have a higher risk of developing cardiovascular disease. If  You smoke, quitting is the best thing you can do for your health. Check American Heart Association on line for more information on Life's Simple 7 and tips for healthy living. A Healthy Heart: Care Instructions  Your Care Instructions     Coronary artery disease, also called heart disease, occurs when a substance called plaque builds up in the vessels that supply oxygen-rich blood to your heart muscle. This can narrow the blood vessels and reduce blood flow. A heart attack happens when blood flow is completely blocked. A high-fat diet, smoking, and other factors increase the risk of heart disease. Your doctor has found that you have a chance of having heart disease. You can do lots of things to keep your heart healthy. It may not be easy, but you can change your diet, exercise more, and quit smoking. These steps really work to lower your chance of heart disease. Follow-up care is a key part of your treatment and safety. Be sure to make and go to all appointments, and call your doctor if you are having problems. It's also a good idea to know your test results and keep a list of the medicines you take. How can you care for yourself at home? Diet  · Use less salt when you cook and eat. This helps lower your blood pressure. Taste food before salting.  Add only a little salt when you think you need it. With time, your taste buds will adjust to less salt. · Eat fewer snack items, fast foods, canned soups, and other high-salt, high-fat, processed foods. · Read food labels and try to avoid saturated and trans fats. They increase your risk of heart disease by raising cholesterol levels. · Limit the amount of solid fat-butter, margarine, and shortening-you eat. Use olive, peanut, or canola oil when you cook. Bake, broil, and steam foods instead of frying them. · Eat a variety of fruit and vegetables every day. Dark green, deep orange, red, or yellow fruits and vegetables are especially good for you. Examples include spinach, carrots, peaches, and berries. · Foods high in fiber can reduce your cholesterol and provide important vitamins and minerals. High-fiber foods include whole-grain cereals and breads, oatmeal, beans, brown rice, citrus fruits, and apples. · Eat lean proteins. Heart-healthy proteins include seafood, lean meats and poultry, eggs, beans, peas, nuts, seeds, and soy products. · Limit drinks and foods with added sugar. These include candy, desserts, and soda pop. Lifestyle changes  · If your doctor recommends it, get more exercise. Walking is a good choice. Bit by bit, increase the amount you walk every day. Try for at least 30 minutes on most days of the week. You also may want to swim, bike, or do other activities. · Do not smoke. If you need help quitting, talk to your doctor about stop-smoking programs and medicines. These can increase your chances of quitting for good. Quitting smoking may be the most important step you can take to protect your heart. It is never too late to quit. · Limit alcohol to 2 drinks a day for men and 1 drink a day for women. Too much alcohol can cause health problems. · Manage other health problems such as diabetes, high blood pressure, and high cholesterol.  If you think you may have a problem with alcohol or drug use, talk to your doctor. Medicines  · Take your medicines exactly as prescribed. Call your doctor if you think you are having a problem with your medicine. · If your doctor recommends aspirin, take the amount directed each day. Make sure you take aspirin and not another kind of pain reliever, such as acetaminophen (Tylenol). When should you call for help? [id number] if you have symptoms of a heart attack. These may include:  · Chest pain or pressure, or a strange feeling in the chest.  · Sweating. · Shortness of breath. · Pain, pressure, or a strange feeling in the back, neck, jaw, or upper belly or in one or both shoulders or arms. · Lightheadedness or sudden weakness. · A fast or irregular heartbeat. After you call 911, the  may tell you to chew 1 adult-strength or 2 to 4 low-dose aspirin. Wait for an ambulance. Do not try to drive yourself. Watch closely for changes in your health, and be sure to contact your doctor if you have any problems. Where can you learn more? Go to https://[employer]. org and sign in to your [employer] account. Enter N029 in the [facility] box to learn more about \"A Healthy Heart: Care Instructions. \"     If you do not have an account, please click on the \"Sign Up Now\" link. Current as of: December 16, 2019               Content Version: 12.5  © 3693-2673 [employer]. Care instructions adapted under license by [facility] [place] ([facility]). If you have questions about a medical condition or this instruction, always ask your healthcare professional. [place] [address] any warranty or liability for your use of this information. Patient Education        apixaban  Pronunciation:  a PIX a ban  Brand:  Eliquis  What is the most important information I should know about apixaban? Apixaban increases your risk of severe or fatal bleeding, especially if you take certain medicines at the same time (including some over-the-counter medicines).  It is very important to tell your doctor about all medicines you have recently used. Call your doctor at once if you have signs of bleeding such as: swelling, pain, feeling very weak or dizzy, bleeding gums, nosebleeds, heavy menstrual periods or abnormal vaginal bleeding, blood in your urine, bloody or tarry stools, coughing up blood or vomit that looks like coffee grounds, or any bleeding that will not stop. Apixaban can cause a very serious blood clot around your spinal cord if you undergo a spinal tap or receive spinal anesthesia (epidural), especially if you have a genetic spinal defect, if you have a spinal catheter in place, if you have a history of spinal surgery or repeated spinal taps, or if you are also using other drugs that can affect blood clotting. This type of blood clot can lead to long-term or permanent paralysis. Get emergency medical help if you have symptoms of a spinal cord blood clot such as back pain, numbness or muscle weakness in your lower body, or loss of bladder or bowel control. Do not stop taking apixaban unless your doctor tells you to. Stopping suddenly can increase your risk of blood clot or stroke. What is apixaban? Apixaban is used to lower the risk of stroke caused by a blood clot in people with a heart rhythm disorder called atrial fibrillation. Apixaban is also used after hip or knee replacement surgery to prevent a type of blood clot called deep vein thrombosis (DVT), which can lead to blood clots in the lungs (pulmonary embolism). Apixaban is also used to treat DVT or pulmonary embolism (PE), and to lower your risk of having a repeat DVT or PE. Apixaban may also be used for purposes not listed in this medication guide. What should I discuss with my healthcare provider before taking apixaban? You should not take apixaban if you are allergic to it, or if you have active bleeding from a surgery, injury, or other cause.   Apixaban may cause you to bleed more easily, especially if you have a bleeding disorder that is inherited or caused by disease. Tell your doctor if you have an artificial heart valve, or if you have ever had:  · liver or kidney disease;  · if you are older than 80; or  · if you weigh less than 132 pounds (60 kilograms). Apixaban can cause a very serious blood clot around your spinal cord if you undergo a spinal tap or receive spinal anesthesia (epidural). This type of blood clot could cause long-term paralysis, and may be more likely to occur if:    · you have a spinal catheter in place or if a catheter has been recently removed;  · you have a history of spinal surgery or repeated spinal taps;  · you have recently had a spinal tap or epidural anesthesia;  · you are taking an NSAID (nonsteroidal anti-inflammatory drug)--aspirin, ibuprofen (Advil, Motrin), naproxen (Aleve), diclofenac, indomethacin, meloxicam, and others; or  · you are using other medicines to treat or prevent blood clots. Taking apixaban may increase the risk of bleeding while you are pregnant or during your delivery. Tell your doctor if you are pregnant or plan to become pregnant. You should not breast-feed while using this medicine. How should I take apixaban? Follow all directions on your prescription label and read all medication guides or instruction sheets. Your doctor may occasionally change your dose. Use the medicine exactly as directed. You may take apixaban with or without food. If you cannot swallow a tablet whole, crush and mix it with water, apple juice, or a spoonful of applesauce. Swallow the mixture right away without chewing. Do not save it for later use. A crushed tablet mixture may also be given through a nasogastric (NG) feeding tube. Read and carefully follow any Instructions for Use provided with your medicine. Ask your doctor or pharmacist if you do not understand these instructions. Apixaban can make it easier for you to bleed, even from a minor injury.   Seek vomit that looks like coffee grounds. This is not a complete list of side effects and others may occur. Call your doctor for medical advice about side effects. You may report side effects to FDA at 4-861-QOT-3454. What other drugs will affect apixaban? Sometimes it is not safe to use certain medications at the same time. Some drugs can affect your blood levels of other drugs you take, which may increase side effects or make the medications less effective. Many other drugs (including some over-the-counter medicines) can increase your risk of bleeding or blood clots, or your risk of developing blood clots around the brain or spinal cord during a spinal tap or epidural. It is very important to tell your doctor about all medicines you have recently used, especially:  · any other medicines to treat or prevent blood clots;  · a blood thinner such as heparin or warfarin (Coumadin, Jantoven);  · an antidepressant; or  · an NSAID (nonsteroidal anti-inflammatory drug) used long term. This list is not complete and many other drugs may affect apixaban. This includes prescription and over-the-counter medicines, vitamins, and herbal products. Not all possible drug interactions are listed here. Where can I get more information? Your pharmacist can provide more information about apixaban. Remember, keep this and all other medicines out of the reach of children, never share your medicines with others, and use this medication only for the indication prescribed. Every effort has been made to ensure that the information provided by [address] is accurate, up-to-date, and complete, but no guarantee is made to that effect. Drug information contained herein may be time sensitive.  [facility] information has been compiled for use by healthcare practitioners and consumers in the [place] and therefore [facility] does not warrant that uses outside of the [place] are appropriate, unless specifically indicated

## 2020-07-30 RX ORDER — OLMESARTAN MEDOXOMIL 20 MG/1
20 TABLET ORAL DAILY
Qty: 90 TABLET | Refills: 1 | Status: SHIPPED | OUTPATIENT
Start: 2020-07-30 | End: 2021-10-18

## 2020-09-11 ENCOUNTER — TELEPHONE (OUTPATIENT)
Dept: CARDIOLOGY | Age: 65
End: 2020-09-11

## 2021-03-15 RX ORDER — SOTALOL HYDROCHLORIDE 80 MG/1
TABLET ORAL
Qty: 90 TABLET | Refills: 0 | Status: SHIPPED | OUTPATIENT
Start: 2021-03-15 | End: 2021-06-08

## 2021-06-08 RX ORDER — SOTALOL HYDROCHLORIDE 80 MG/1
TABLET ORAL
Qty: 90 TABLET | Refills: 0 | Status: SHIPPED | OUTPATIENT
Start: 2021-06-08 | End: 2021-09-18

## 2021-06-24 ENCOUNTER — OFFICE VISIT (OUTPATIENT)
Dept: CARDIOLOGY CLINIC | Age: 66
End: 2021-06-24
Payer: MEDICARE

## 2021-06-24 VITALS
DIASTOLIC BLOOD PRESSURE: 72 MMHG | HEIGHT: 66 IN | BODY MASS INDEX: 34.55 KG/M2 | WEIGHT: 215 LBS | HEART RATE: 58 BPM | SYSTOLIC BLOOD PRESSURE: 130 MMHG

## 2021-06-24 DIAGNOSIS — I48.0 PAF (PAROXYSMAL ATRIAL FIBRILLATION) (HCC): Primary | ICD-10-CM

## 2021-06-24 DIAGNOSIS — I48.91 ATRIAL FIBRILLATION WITH RVR (HCC): ICD-10-CM

## 2021-06-24 PROCEDURE — G8417 CALC BMI ABV UP PARAM F/U: HCPCS | Performed by: INTERNAL MEDICINE

## 2021-06-24 PROCEDURE — G8400 PT W/DXA NO RESULTS DOC: HCPCS | Performed by: INTERNAL MEDICINE

## 2021-06-24 PROCEDURE — 99214 OFFICE O/P EST MOD 30 MIN: CPT | Performed by: INTERNAL MEDICINE

## 2021-06-24 PROCEDURE — 1090F PRES/ABSN URINE INCON ASSESS: CPT | Performed by: INTERNAL MEDICINE

## 2021-06-24 PROCEDURE — 93000 ELECTROCARDIOGRAM COMPLETE: CPT | Performed by: INTERNAL MEDICINE

## 2021-06-24 PROCEDURE — 4040F PNEUMOC VAC/ADMIN/RCVD: CPT | Performed by: INTERNAL MEDICINE

## 2021-06-24 PROCEDURE — 1036F TOBACCO NON-USER: CPT | Performed by: INTERNAL MEDICINE

## 2021-06-24 PROCEDURE — 3017F COLORECTAL CA SCREEN DOC REV: CPT | Performed by: INTERNAL MEDICINE

## 2021-06-24 PROCEDURE — 1123F ACP DISCUSS/DSCN MKR DOCD: CPT | Performed by: INTERNAL MEDICINE

## 2021-06-24 PROCEDURE — G8427 DOCREV CUR MEDS BY ELIG CLIN: HCPCS | Performed by: INTERNAL MEDICINE

## 2021-06-24 NOTE — PATIENT INSTRUCTIONS
Patient Education        COVID-19 (coronavirus 2019) vaccine, Pfizer  Pronunciation:  KOE vid-19 jimbo IZAGUIRRE na vye calderon VAX een  Brand:  Pfizer-BioNTech COVID-19 Vaccine PF  What is the most important information I should know about this vaccine? The FDA has authorized emergency use of this vaccine as it may help prevent infection with COVID-19. This vaccine has not been approved to prevent or treat coronavirus or COVID-19. Becoming infected with COVID-19 is much more dangerous to your health than receiving this vaccine. If you have a certain type of reaction after the first dose of this vaccine, your healthcare provider will determine if you can safely receive the second dose. What is the COVID-19 vaccine? COVID-19 is a serious disease caused by a coronavirus called SARS-CoV-2 (Serious Acute Respiratory Syndrome Coronavirus 2). COVID-19 is spread from person to person through the air. COVID-19 can affect your lungs or other organs. Symptoms may be mild or serious and include fever, chills, cough, sore throat, shortness of breath, tiredness, body aches, headache, loss of taste or smell, runny or stuffy nose, nausea, vomiting, or diarrhea. The [employer] and Drug Administration (FDA) has authorized emergency use of COVID-19 vaccine to help prevent infection with SARS-CoV-2. The Pfizer vaccine is for use in people who are at least 12years old. This vaccine may help your body develop immunity to SARS-CoV-2. However, this vaccine has not been approved to prevent or treat coronavirus or COVID-19. COVID-19 vaccine is experimental and all of its risks are not yet known. The COVID-19 vaccine does not contain coronavirus and cannot give you COVID-19. Like any vaccine, COVID-19 vaccine may not provide protection in every person. What should I discuss with my healthcare provider before receiving this vaccine? You should not receive this vaccine if you've ever had an allergic reaction to a COVID-19 vaccine.  Your healthcare provider will determine whether any reaction you have would prevent you from safely getting the second dose. If you are infected with COVID-19, are waiting for testing results, or are exposed to someone infected with COVID-19: You may not be able to receive this vaccine until you have no symptoms and/or your required quarantine period has ended. Receiving this vaccine will not make you less contagious to other people if you are infected with COVID-19 but you have no symptoms. If you had COVID-19 and were treated with monoclonal antibodies or convalescent plasma: You should wait 90 days before getting a COVID-19 vaccine. Ask your doctor if you are unsure about any COVID-19 treatments you received. Tell the person giving you this vaccine if you have a fever, or if:  · you've received any treatment or medication for COVID-19 infection in the past 90 days;  · you've received or will receive any other vaccine within 14 days before or after your COVID-19 vaccine;  · you have a weak immune system caused by disease or by using certain medicine (this vaccine may not be as effective if you are immunosuppressed);  · you have bleeding problems;  · you use a blood thinner (such as warfarin, Coumadin, or Lupillo Mohs); or  · you are pregnant or breastfeeding. COVID-19 is more likely to cause serious illness or death in a pregnant woman. Not all risks are known yet, but this vaccine is likely to be less harmful than becoming infected with COVID-19 during pregnancy. How is this vaccine given? Read all vaccine information sheets provided to you. COVID-19 vaccine is given as an injection (shot) into a muscle. The Pfizer vaccine is given in a series of 2 shots given 3 weeks apart. Your first and second shot should both be the [employer] type of COVID-19 vaccine. The Pfizer COVID-19 vaccine is transported and stored at ultra-cold temperatures.  However, this vaccine will be at room temperature when it is injected into your hives, itching; confusion, dizziness, fainting; vomiting, diarrhea; fast heartbeats, wheezing, difficult breathing; swelling of your face, lips, tongue, or throat. An allergic reaction is more likely to occur within 30 minutes after you receive the vaccine. You will be treated quickly if you have a reaction. You should not receive the second vaccine if the first shot caused an allergic reaction. Your healthcare provider will determine if you can safely receive the second dose. Becoming infected with COVID-19 is much more dangerous to your health than receiving this vaccine. Serious side effects other than an allergic reaction may include:  · pale or clammy skin, sweating, feeling warm or cold;  · feeling anxious, nauseated, weak, or light-headed;  · slow heartbeats, rapid breathing; or  · changes in vision or hearing. Fever may be a normal symptom as your body begins to develop immunity to COVID-19. However, you should call your doctor right away if you have any side effects that concern you. Common side effects may include:  · fever, chills, swollen glands;  · pain, redness, or swelling where the shot was given;  · nausea, not feeling well;  · feeling tired; or  · headache, muscle pain, joint pain. You may be able to treat these effects with an over-the-counter pain reliever such as acetaminophen (Tylenol) or ibuprofen (Motrin, Advil, and others). Follow the label directions or your vaccination provider's instructions. Other side effects, mild or serious, may occur with more widespread use of COVID-19 vaccine. This is not a complete list of side effects and others may occur. Call your doctor for medical advice about side effects. You may report vaccine side effects to the [place] [address] and Human Services at [phone].   You may also use a smartphone-based program called V-safe to communicate with the Centers for Disease Control and Prevention (CDC) about any health problems you have after receiving a COVID-19 vaccine: www.cdc.gov/vsafe. What other drugs will affect this vaccine? Before receiving this vaccine, tell your vaccination provider about all other vaccines you have received in the past 14 days. Also tell the provider about all your current medicines. This includes prescription and over-the-counter medicines, vitamins, and herbal products. Not all possible interactions are listed here. Where can I get more information? Your vaccination provider, pharmacist, or doctor can provide more information about this vaccine. Additional information is available from your local health department or the Centers for Disease Control and Prevention. Remember, keep this and all other medicines out of the reach of children, never share your medicines with others, and use this medication only for the indication prescribed. Every effort has been made to ensure that the information provided by [address] is accurate, up-to-date, and complete, but no guarantee is made to that effect. Drug information contained herein may be time sensitive. [facility] information has been compiled for use by healthcare practitioners and consumers in the [place] and therefore [facility] does not warrant that uses outside of the [place] are appropriate, unless specifically indicated otherwise. [facility]'s drug information does not endorse drugs, diagnose patients or recommend therapy. [facility] drug information is an informational resource designed to assist licensed healthcare practitioners in caring for their patients and/or to serve consumers viewing this service as a supplement to, and not a substitute for, the expertise, skill, knowledge and judgment of healthcare practitioners. The absence of a warning for a given drug or drug combination in no way should be construed to indicate that the drug or drug combination is safe, effective or appropriate for any given patient.  [facility] does not assume any responsibility for any aspect of healthcare administered with the aid of information [facility] provides. The information contained herein is not intended to cover all possible uses, directions, precautions, warnings, drug interactions, allergic reactions, or adverse effects. If you have questions about the drugs you are taking, check with your doctor, nurse or pharmacist.  Copyright 0504-1118 [address]: 2.01. Revision date: 3/8/2021. Care instructions adapted under license by [facility] ([facility]). If you have questions about a medical condition or this instruction, always ask your healthcare professional. [place] [address] any warranty or liability for your use of this information. Patient Education        COVID-19 (coronavirus 2019) vaccine, Moderna  Pronunciation:  JIMBO arias (jimbo mancera) VAX een  Brand: Moderna COVID-19 Vaccine PF  What is the most important information I should know about this vaccine? The FDA has authorized emergency use of this vaccine as it may help prevent infection with COVID-19. This vaccine has not been approved to prevent or treat coronavirus or COVID-19. Becoming infected with COVID-19 is much more dangerous to your health than receiving this vaccine. If you have a certain type of reaction after the first dose of this vaccine, your healthcare provider will determine if you can safely receive the second dose. What is the COVID-19 vaccine? COVID-19 is a serious disease caused by a coronavirus called SARS-CoV-2 (Serious Acute Respiratory Syndrome Coronavirus 2). COVID-19 is spread from person to person through the air. COVID-19 can affect your lungs or other organs. Symptoms may be mild or serious and include fever, chills, cough, sore throat, shortness of breath, tiredness, body aches, headache, loss of taste or smell, runny or stuffy nose, nausea, vomiting, or diarrhea.   The [address] and Drug Administration (FDA) has authorized emergency use of COVID-19 vaccine to help prevent infection with SARS-CoV-2. The Moderna vaccine is for use only in adults. This vaccine may help your body develop immunity to SARS-CoV-2. However, this vaccine has not been approved to prevent or treat coronavirus or COVID-19. COVID-19 vaccine is experimental and all of its risks are not yet known. The COVID-19 vaccine does not contain coronavirus and cannot give you COVID-19. Like any vaccine, COVID-19 vaccine may not provide protection in every person. What should I discuss with my healthcare provider before receiving this vaccine? You should not receive this vaccine if you've ever had an allergic reaction to a COVID-19 vaccine. Your healthcare provider will determine whether any reaction you have would prevent you from safely getting the second dose. If you are infected with COVID-19, are waiting for testing results, or are exposed to someone infected with COVID-19: You may not be able to receive this vaccine until you have no symptoms and/or your required quarantine period has ended. Receiving this vaccine will not make you less contagious to other people if you are infected with COVID-19 but you have no symptoms. If you had COVID-19 and were treated with monoclonal antibodies or convalescent plasma: You should wait 90 days before getting a COVID-19 vaccine. Ask your doctor if you are unsure about any COVID-19 treatments you received.   Tell the person giving you this vaccine if you have a fever, or if:  · you've received any treatment or medication for COVID-19 infection in the past 90 days;  · you've received or will receive any other vaccine within 14 days before or after your COVID-19 vaccine;  · you have a weak immune system caused by disease or by using certain medicine (this vaccine may not be as effective if you are immunosuppressed);  · you have bleeding problems;  · you use a blood thinner (such as warfarin, Coumadin, or Jantoven); or  · you are pregnant or breastfeeding. COVID-19 is more likely to cause serious illness or death in a pregnant woman. Not all risks are known yet, but this vaccine is likely to be less harmful than becoming infected with COVID-19 during pregnancy. How is this vaccine given? Read all vaccine information sheets provided to you. COVID-19 vaccine is given as an injection (shot) into a muscle. The Moderna vaccine is given in a series of 2 shots given 1 month apart. Your first and second shot should both be the Moderna type of COVID-19 vaccine. The Moderna COVID-19 vaccine is transported and stored at very cold temperatures. However, this vaccine will be at room temperature when it is injected into your arm. You will receive a reminder card showing the date and type of your first injection. Take this card with you when you get your second shot. You will be \"fully vaccinated\" if it has been at least 2 weeks since you received your second dose of this vaccine. You may become infected with COVID-19 if the vaccine has not had enough time to provide protection. Even after you are fully vaccinated, keep using infection control methods when you are in public or around others who may not have been vaccinated. This includes social distancing, hand-washing, using protective face covering, disinfecting surfaces you touch a lot, and not sharing personal items with others. Receiving a COVID-19 vaccine will not cause you to test positive on a coronavirus test. However, once your body develops immunity to COVID-19, you could test positive on an antibody test (a test to detect immunity in your body from previous exposure to coronavirus). It is not known how long this vaccine will protect you from infection with COVID-19. It also is not known how long immunity will last in a person who's been infected with and recovered from COVID-19. COVID-19 vaccine is still being studied and all of its risks are not yet known.  Updated federal public health recommendations may be found at [email or web address]  What happens if I miss a dose? Be sure to receive all recommended doses of COVID-19 vaccine or you may not be fully protected. Contact your vaccination provider or health department if you miss your second dose. What happens if I overdose? An overdose of this vaccine is unlikely to occur. What should I avoid after receiving this vaccine? Avoid receiving other vaccines without first seeking medical advice. What are the possible side effects of this vaccine? Get emergency medical help if you have signs of an allergic reaction: hives, itching; confusion, dizziness, fainting; vomiting, diarrhea; fast heartbeats, wheezing, difficult breathing; swelling of your face, lips, tongue, or throat. An allergic reaction is more likely to occur within 30 minutes after you receive the vaccine. You will be treated quickly if you have a reaction. You should not receive the second vaccine if the first shot caused an allergic reaction. Your healthcare provider will determine if you can safely receive the second dose. Becoming infected with COVID-19 is much more dangerous to your health than receiving this vaccine. Serious side effects other than an allergic reaction may include:  · pale or clammy skin, sweating, feeling warm or cold;  · feeling anxious, nauseated, weak, or light-headed;  · slow heartbeats, rapid breathing; or  · changes in vision or hearing. Fever may be a normal symptom as your body begins to develop immunity to COVID-19. However, you should call your doctor right away if you have any side effects that concern you. Common side effects may include:  · fever, chills, swollen glands;  · swelling or tenderness under your arm;  · pain, redness, or swelling where the shot was given;  · nausea, vomiting;  · feeling tired; or  · headache, muscle pain, joint pain.   You may be able to treat these effects with an over-the-counter pain reliever such as acetaminophen (Tylenol) or ibuprofen (Motrin, Advil, and others). Follow the label directions or your vaccination provider's instructions. Other side effects, mild or serious, may occur with more widespread use of COVID-19 vaccine. This is not a complete list of side effects and others may occur. Call your doctor for medical advice about side effects. You may report vaccine side effects to the [address] [place] [address] and Human Services at [phone]. You may also use a smartphone-based program called V-safe to communicate with the Centers for Disease Control and Prevention (CDC) about any health problems you have after receiving a COVID-19 vaccine: www.cdc.gov/vsafe. What other drugs will affect this vaccine? Before receiving this vaccine, tell your vaccination provider about all other vaccines you have received in the past 14 days. Also tell the provider about all your current medicines. This includes prescription and over-the-counter medicines, vitamins, and herbal products. Not all possible interactions are listed here. Where can I get more information? Your vaccination provider, pharmacist, or doctor can provide more information about this vaccine. Additional information is available from your local health department or the Centers for Disease Control and Prevention. Remember, keep this and all other medicines out of the reach of children, never share your medicines with others, and use this medication only for the indication prescribed. Every effort has been made to ensure that the information provided by [address] is accurate, up-to-date, and complete, but no guarantee is made to that effect. Drug information contained herein may be time sensitive.  [facility] information has been compiled for use by healthcare practitioners and consumers in the [place] and therefore [facility] does not warrant that uses outside of the [place] are appropriate, unless specifically indicated otherwise. [facility]'s drug information does not endorse drugs, diagnose patients or recommend therapy. [facility] drug information is an informational resource designed to assist licensed healthcare practitioners in caring for their patients and/or to serve consumers viewing this service as a supplement to, and not a substitute for, the expertise, skill, knowledge and judgment of healthcare practitioners. The absence of a warning for a given drug or drug combination in no way should be construed to indicate that the drug or drug combination is safe, effective or appropriate for any given patient. [facility] does not assume any responsibility for any aspect of healthcare administered with the aid of information [facility] provides. The information contained herein is not intended to cover all possible uses, directions, precautions, warnings, drug interactions, allergic reactions, or adverse effects. If you have questions about the drugs you are taking, check with your doctor, nurse or pharmacist.  Copyright 0679-1355 [address]: 2.. Revision date: 3/8/2021. Care instructions adapted under license by [facility] ([facility]). If you have questions about a medical condition or this instruction, always ask your healthcare professional. [place] [address] any warranty or liability for your use of this information.

## 2021-09-18 ENCOUNTER — HOSPITAL ENCOUNTER (INPATIENT)
Age: 66
LOS: 1 days | Discharge: HOME | DRG: 262 | End: 2021-09-21
Attending: EMERGENCY MEDICINE | Admitting: STUDENT IN AN ORGANIZED HEALTH CARE EDUCATION/TRAINING PROGRAM
Payer: MEDICARE

## 2021-09-18 ENCOUNTER — APPOINTMENT (OUTPATIENT)
Dept: GENERAL RADIOLOGY | Age: 66
DRG: 262 | End: 2021-09-18
Payer: MEDICARE

## 2021-09-18 DIAGNOSIS — I48.91 ATRIAL FIBRILLATION WITH RVR (HCC): Primary | ICD-10-CM

## 2021-09-18 LAB
ALBUMIN SERPL-MCNC: 4.2 G/DL (ref 3.5–5.2)
ALP BLD-CCNC: 140 U/L (ref 35–104)
ALT SERPL-CCNC: 36 U/L (ref 5–33)
ANION GAP SERPL CALCULATED.3IONS-SCNC: 11 MMOL/L (ref 7–19)
APTT: 32.2 SEC (ref 26–36.2)
AST SERPL-CCNC: 29 U/L (ref 5–32)
BASOPHILS ABSOLUTE: 0 K/UL (ref 0–0.2)
BASOPHILS RELATIVE PERCENT: 0.3 % (ref 0–1)
BILIRUB SERPL-MCNC: 0.3 MG/DL (ref 0.2–1.2)
BUN BLDV-MCNC: 15 MG/DL (ref 8–23)
CALCIUM SERPL-MCNC: 9.9 MG/DL (ref 8.8–10.2)
CHLORIDE BLD-SCNC: 106 MMOL/L (ref 98–111)
CO2: 25 MMOL/L (ref 22–29)
CREAT SERPL-MCNC: 0.4 MG/DL (ref 0.5–0.9)
EOSINOPHILS ABSOLUTE: 0.2 K/UL (ref 0–0.6)
EOSINOPHILS RELATIVE PERCENT: 3.6 % (ref 0–5)
GFR AFRICAN AMERICAN: >59
GFR NON-AFRICAN AMERICAN: >60
GLUCOSE BLD-MCNC: 153 MG/DL (ref 74–109)
HCT VFR BLD CALC: 43.1 % (ref 37–47)
HEMOGLOBIN: 13.6 G/DL (ref 12–16)
IMMATURE GRANULOCYTES #: 0 K/UL
INR BLD: 1.12 (ref 0.88–1.18)
LYMPHOCYTES ABSOLUTE: 2.1 K/UL (ref 1.1–4.5)
LYMPHOCYTES RELATIVE PERCENT: 36.5 % (ref 20–40)
MAGNESIUM: 1.9 MG/DL (ref 1.6–2.4)
MCH RBC QN AUTO: 31.3 PG (ref 27–31)
MCHC RBC AUTO-ENTMCNC: 31.6 G/DL (ref 33–37)
MCV RBC AUTO: 99.3 FL (ref 81–99)
MONOCYTES ABSOLUTE: 0.6 K/UL (ref 0–0.9)
MONOCYTES RELATIVE PERCENT: 10.8 % (ref 0–10)
NEUTROPHILS ABSOLUTE: 2.8 K/UL (ref 1.5–7.5)
NEUTROPHILS RELATIVE PERCENT: 48.5 % (ref 50–65)
PDW BLD-RTO: 13.7 % (ref 11.5–14.5)
PLATELET # BLD: 186 K/UL (ref 130–400)
PMV BLD AUTO: 9.9 FL (ref 9.4–12.3)
POTASSIUM SERPL-SCNC: 3.7 MMOL/L (ref 3.5–5)
PRO-BNP: 134 PG/ML (ref 0–900)
PROTHROMBIN TIME: 14.6 SEC (ref 12–14.6)
RBC # BLD: 4.34 M/UL (ref 4.2–5.4)
SARS-COV-2, NAAT: NOT DETECTED
SODIUM BLD-SCNC: 142 MMOL/L (ref 136–145)
TOTAL PROTEIN: 7.3 G/DL (ref 6.6–8.7)
TROPONIN: <0.01 NG/ML (ref 0–0.03)
TSH SERPL DL<=0.05 MIU/L-ACNC: 6.89 UIU/ML (ref 0.27–4.2)
WBC # BLD: 5.8 K/UL (ref 4.8–10.8)

## 2021-09-18 PROCEDURE — 2500000003 HC RX 250 WO HCPCS: Performed by: EMERGENCY MEDICINE

## 2021-09-18 PROCEDURE — 2580000003 HC RX 258: Performed by: EMERGENCY MEDICINE

## 2021-09-18 PROCEDURE — 96366 THER/PROPH/DIAG IV INF ADDON: CPT

## 2021-09-18 PROCEDURE — 84443 ASSAY THYROID STIM HORMONE: CPT

## 2021-09-18 PROCEDURE — 82306 VITAMIN D 25 HYDROXY: CPT

## 2021-09-18 PROCEDURE — 85025 COMPLETE CBC W/AUTO DIFF WBC: CPT

## 2021-09-18 PROCEDURE — 85610 PROTHROMBIN TIME: CPT

## 2021-09-18 PROCEDURE — 87635 SARS-COV-2 COVID-19 AMP PRB: CPT

## 2021-09-18 PROCEDURE — 71045 X-RAY EXAM CHEST 1 VIEW: CPT

## 2021-09-18 PROCEDURE — 83735 ASSAY OF MAGNESIUM: CPT

## 2021-09-18 PROCEDURE — 99283 EMERGENCY DEPT VISIT LOW MDM: CPT

## 2021-09-18 PROCEDURE — 36415 COLL VENOUS BLD VENIPUNCTURE: CPT

## 2021-09-18 PROCEDURE — 85730 THROMBOPLASTIN TIME PARTIAL: CPT

## 2021-09-18 PROCEDURE — 83880 ASSAY OF NATRIURETIC PEPTIDE: CPT

## 2021-09-18 PROCEDURE — G0378 HOSPITAL OBSERVATION PER HR: HCPCS

## 2021-09-18 PROCEDURE — 80053 COMPREHEN METABOLIC PANEL: CPT

## 2021-09-18 PROCEDURE — 93005 ELECTROCARDIOGRAM TRACING: CPT | Performed by: EMERGENCY MEDICINE

## 2021-09-18 PROCEDURE — 96365 THER/PROPH/DIAG IV INF INIT: CPT

## 2021-09-18 PROCEDURE — 6370000000 HC RX 637 (ALT 250 FOR IP): Performed by: EMERGENCY MEDICINE

## 2021-09-18 PROCEDURE — 84484 ASSAY OF TROPONIN QUANT: CPT

## 2021-09-18 RX ORDER — SODIUM CHLORIDE 0.9 % (FLUSH) 0.9 %
5-40 SYRINGE (ML) INJECTION PRN
Status: DISCONTINUED | OUTPATIENT
Start: 2021-09-18 | End: 2021-09-21 | Stop reason: HOSPADM

## 2021-09-18 RX ORDER — POLYETHYLENE GLYCOL 3350 17 G/17G
17 POWDER, FOR SOLUTION ORAL DAILY PRN
Status: DISCONTINUED | OUTPATIENT
Start: 2021-09-18 | End: 2021-09-19

## 2021-09-18 RX ORDER — ONDANSETRON 2 MG/ML
4 INJECTION INTRAMUSCULAR; INTRAVENOUS EVERY 6 HOURS PRN
Status: DISCONTINUED | OUTPATIENT
Start: 2021-09-18 | End: 2021-09-19

## 2021-09-18 RX ORDER — SPIRONOLACTONE 25 MG/1
25 TABLET ORAL DAILY
Status: DISCONTINUED | OUTPATIENT
Start: 2021-09-19 | End: 2021-09-21 | Stop reason: HOSPADM

## 2021-09-18 RX ORDER — CALCIUM CARBONATE 500(1250)
500 TABLET ORAL DAILY
Status: DISCONTINUED | OUTPATIENT
Start: 2021-09-19 | End: 2021-09-19

## 2021-09-18 RX ORDER — DILTIAZEM HYDROCHLORIDE 5 MG/ML
10 INJECTION INTRAVENOUS ONCE
Status: COMPLETED | OUTPATIENT
Start: 2021-09-18 | End: 2021-09-18

## 2021-09-18 RX ORDER — ACETAMINOPHEN 650 MG/1
650 SUPPOSITORY RECTAL EVERY 6 HOURS PRN
Status: DISCONTINUED | OUTPATIENT
Start: 2021-09-18 | End: 2021-09-18 | Stop reason: SDUPTHER

## 2021-09-18 RX ORDER — ONDANSETRON 4 MG/1
4 TABLET, ORALLY DISINTEGRATING ORAL EVERY 8 HOURS PRN
Status: DISCONTINUED | OUTPATIENT
Start: 2021-09-18 | End: 2021-09-19

## 2021-09-18 RX ORDER — ACETAMINOPHEN 325 MG/1
650 TABLET ORAL EVERY 6 HOURS PRN
Status: DISCONTINUED | OUTPATIENT
Start: 2021-09-18 | End: 2021-09-18 | Stop reason: SDUPTHER

## 2021-09-18 RX ORDER — METOPROLOL SUCCINATE 25 MG/1
25 TABLET, EXTENDED RELEASE ORAL DAILY
Status: ON HOLD | COMMUNITY
End: 2021-09-21 | Stop reason: HOSPADM

## 2021-09-18 RX ORDER — SODIUM CHLORIDE 0.9 % (FLUSH) 0.9 %
5-40 SYRINGE (ML) INJECTION PRN
Status: DISCONTINUED | OUTPATIENT
Start: 2021-09-18 | End: 2021-09-18 | Stop reason: SDUPTHER

## 2021-09-18 RX ORDER — SODIUM CHLORIDE 9 MG/ML
25 INJECTION, SOLUTION INTRAVENOUS PRN
Status: DISCONTINUED | OUTPATIENT
Start: 2021-09-18 | End: 2021-09-21 | Stop reason: HOSPADM

## 2021-09-18 RX ORDER — METOPROLOL SUCCINATE 25 MG/1
25 TABLET, EXTENDED RELEASE ORAL DAILY
Status: DISCONTINUED | OUTPATIENT
Start: 2021-09-19 | End: 2021-09-20

## 2021-09-18 RX ORDER — CHLORAL HYDRATE 500 MG
2000 CAPSULE ORAL 2 TIMES DAILY
Status: DISCONTINUED | OUTPATIENT
Start: 2021-09-18 | End: 2021-09-19

## 2021-09-18 RX ORDER — ASPIRIN 325 MG
325 TABLET ORAL ONCE
Status: COMPLETED | OUTPATIENT
Start: 2021-09-18 | End: 2021-09-18

## 2021-09-18 RX ORDER — LEVOTHYROXINE SODIUM 0.12 MG/1
125 TABLET ORAL DAILY
Status: DISCONTINUED | OUTPATIENT
Start: 2021-09-19 | End: 2021-09-21 | Stop reason: HOSPADM

## 2021-09-18 RX ORDER — SODIUM CHLORIDE 9 MG/ML
INJECTION, SOLUTION INTRAVENOUS CONTINUOUS
Status: DISCONTINUED | OUTPATIENT
Start: 2021-09-19 | End: 2021-09-21 | Stop reason: HOSPADM

## 2021-09-18 RX ORDER — SODIUM CHLORIDE 0.9 % (FLUSH) 0.9 %
5-40 SYRINGE (ML) INJECTION EVERY 12 HOURS SCHEDULED
Status: DISCONTINUED | OUTPATIENT
Start: 2021-09-18 | End: 2021-09-21 | Stop reason: HOSPADM

## 2021-09-18 RX ORDER — POLYETHYLENE GLYCOL 3350 17 G/17G
17 POWDER, FOR SOLUTION ORAL DAILY PRN
Status: DISCONTINUED | OUTPATIENT
Start: 2021-09-18 | End: 2021-09-18 | Stop reason: SDUPTHER

## 2021-09-18 RX ORDER — SODIUM CHLORIDE 9 MG/ML
25 INJECTION, SOLUTION INTRAVENOUS PRN
Status: DISCONTINUED | OUTPATIENT
Start: 2021-09-18 | End: 2021-09-19 | Stop reason: SDUPTHER

## 2021-09-18 RX ORDER — ACETAMINOPHEN 650 MG/1
650 SUPPOSITORY RECTAL EVERY 6 HOURS PRN
Status: DISCONTINUED | OUTPATIENT
Start: 2021-09-18 | End: 2021-09-21 | Stop reason: HOSPADM

## 2021-09-18 RX ORDER — ACETAMINOPHEN 325 MG/1
650 TABLET ORAL EVERY 6 HOURS PRN
Status: DISCONTINUED | OUTPATIENT
Start: 2021-09-18 | End: 2021-09-21 | Stop reason: HOSPADM

## 2021-09-18 RX ADMIN — DILTIAZEM HYDROCHLORIDE 10 MG/HR: 5 INJECTION INTRAVENOUS at 22:27

## 2021-09-18 RX ADMIN — DILTIAZEM HYDROCHLORIDE 10 MG: 5 INJECTION INTRAVENOUS at 22:14

## 2021-09-18 RX ADMIN — ASPIRIN 325 MG: 325 TABLET ORAL at 22:12

## 2021-09-18 ASSESSMENT — ENCOUNTER SYMPTOMS
SHORTNESS OF BREATH: 0
BACK PAIN: 0
VOMITING: 0
SORE THROAT: 0
ABDOMINAL PAIN: 0
NAUSEA: 0
RHINORRHEA: 0
DIARRHEA: 0

## 2021-09-19 PROBLEM — R73.9 HYPERGLYCEMIA: Status: ACTIVE | Noted: 2021-09-19

## 2021-09-19 LAB
ALBUMIN SERPL-MCNC: 3.8 G/DL (ref 3.5–5.2)
ALP BLD-CCNC: 121 U/L (ref 35–104)
ALT SERPL-CCNC: 34 U/L (ref 5–33)
ANION GAP SERPL CALCULATED.3IONS-SCNC: 10 MMOL/L (ref 7–19)
AST SERPL-CCNC: 28 U/L (ref 5–32)
BILIRUB SERPL-MCNC: 0.3 MG/DL (ref 0.2–1.2)
BUN BLDV-MCNC: 13 MG/DL (ref 8–23)
CALCIUM SERPL-MCNC: 9.5 MG/DL (ref 8.8–10.2)
CHLORIDE BLD-SCNC: 105 MMOL/L (ref 98–111)
CHOLESTEROL, TOTAL: 163 MG/DL (ref 160–199)
CO2: 26 MMOL/L (ref 22–29)
CREAT SERPL-MCNC: 0.4 MG/DL (ref 0.5–0.9)
GFR AFRICAN AMERICAN: >59
GFR NON-AFRICAN AMERICAN: >60
GLUCOSE BLD-MCNC: 117 MG/DL (ref 74–109)
HBA1C MFR BLD: 5.7 % (ref 4–6)
HCT VFR BLD CALC: 41.7 % (ref 37–47)
HDLC SERPL-MCNC: 56 MG/DL (ref 65–121)
HEMOGLOBIN: 13.3 G/DL (ref 12–16)
LDL CHOLESTEROL CALCULATED: 71 MG/DL
LV EF: 58 %
LVEF MODALITY: NORMAL
MCH RBC QN AUTO: 30.9 PG (ref 27–31)
MCHC RBC AUTO-ENTMCNC: 31.9 G/DL (ref 33–37)
MCV RBC AUTO: 97 FL (ref 81–99)
PDW BLD-RTO: 13.6 % (ref 11.5–14.5)
PLATELET # BLD: 178 K/UL (ref 130–400)
PMV BLD AUTO: 10.1 FL (ref 9.4–12.3)
POTASSIUM SERPL-SCNC: 4 MMOL/L (ref 3.5–5)
RBC # BLD: 4.3 M/UL (ref 4.2–5.4)
SODIUM BLD-SCNC: 141 MMOL/L (ref 136–145)
TOTAL PROTEIN: 6.7 G/DL (ref 6.6–8.7)
TRIGL SERPL-MCNC: 181 MG/DL (ref 0–149)
TROPONIN: <0.01 NG/ML (ref 0–0.03)
VITAMIN D 25-HYDROXY: 40.4 NG/ML
WBC # BLD: 5 K/UL (ref 4.8–10.8)

## 2021-09-19 PROCEDURE — 6370000000 HC RX 637 (ALT 250 FOR IP): Performed by: NURSE PRACTITIONER

## 2021-09-19 PROCEDURE — 99221 1ST HOSP IP/OBS SF/LOW 40: CPT | Performed by: INTERNAL MEDICINE

## 2021-09-19 PROCEDURE — 80053 COMPREHEN METABOLIC PANEL: CPT

## 2021-09-19 PROCEDURE — 83036 HEMOGLOBIN GLYCOSYLATED A1C: CPT

## 2021-09-19 PROCEDURE — 6370000000 HC RX 637 (ALT 250 FOR IP): Performed by: HOSPITALIST

## 2021-09-19 PROCEDURE — 6360000002 HC RX W HCPCS: Performed by: INTERNAL MEDICINE

## 2021-09-19 PROCEDURE — 36415 COLL VENOUS BLD VENIPUNCTURE: CPT

## 2021-09-19 PROCEDURE — G0378 HOSPITAL OBSERVATION PER HR: HCPCS

## 2021-09-19 PROCEDURE — 96366 THER/PROPH/DIAG IV INF ADDON: CPT

## 2021-09-19 PROCEDURE — 6360000004 HC RX CONTRAST MEDICATION: Performed by: INTERNAL MEDICINE

## 2021-09-19 PROCEDURE — 2580000003 HC RX 258: Performed by: INTERNAL MEDICINE

## 2021-09-19 PROCEDURE — 84484 ASSAY OF TROPONIN QUANT: CPT

## 2021-09-19 PROCEDURE — 96368 THER/DIAG CONCURRENT INF: CPT

## 2021-09-19 PROCEDURE — 2580000003 HC RX 258: Performed by: HOSPITALIST

## 2021-09-19 PROCEDURE — C8929 TTE W OR WO FOL WCON,DOPPLER: HCPCS

## 2021-09-19 PROCEDURE — 80061 LIPID PANEL: CPT

## 2021-09-19 PROCEDURE — 85027 COMPLETE CBC AUTOMATED: CPT

## 2021-09-19 RX ORDER — LOSARTAN POTASSIUM 50 MG/1
50 TABLET ORAL DAILY
Status: DISCONTINUED | OUTPATIENT
Start: 2021-09-19 | End: 2021-09-21 | Stop reason: HOSPADM

## 2021-09-19 RX ORDER — UREA 10 %
50 LOTION (ML) TOPICAL DAILY
Status: DISCONTINUED | OUTPATIENT
Start: 2021-09-19 | End: 2021-09-19

## 2021-09-19 RX ORDER — UREA 10 %
50 LOTION (ML) TOPICAL DAILY
COMMUNITY

## 2021-09-19 RX ADMIN — AMIODARONE HYDROCHLORIDE 1 MG/MIN: 50 INJECTION, SOLUTION INTRAVENOUS at 11:14

## 2021-09-19 RX ADMIN — PERFLUTREN 1.65 MG: 6.52 INJECTION, SUSPENSION INTRAVENOUS at 13:45

## 2021-09-19 RX ADMIN — SPIRONOLACTONE 25 MG: 25 TABLET ORAL at 08:29

## 2021-09-19 RX ADMIN — AMIODARONE HYDROCHLORIDE 0.5 MG/MIN: 50 INJECTION, SOLUTION INTRAVENOUS at 18:22

## 2021-09-19 RX ADMIN — METOPROLOL SUCCINATE 25 MG: 25 TABLET, EXTENDED RELEASE ORAL at 08:29

## 2021-09-19 RX ADMIN — APIXABAN 5 MG: 5 TABLET, FILM COATED ORAL at 21:43

## 2021-09-19 RX ADMIN — SODIUM CHLORIDE: 9 INJECTION, SOLUTION INTRAVENOUS at 08:26

## 2021-09-19 RX ADMIN — LOSARTAN POTASSIUM 50 MG: 50 TABLET, FILM COATED ORAL at 08:29

## 2021-09-19 RX ADMIN — DEXTROSE MONOHYDRATE 150 MG: 50 INJECTION, SOLUTION INTRAVENOUS at 10:59

## 2021-09-19 RX ADMIN — SODIUM CHLORIDE, PRESERVATIVE FREE 10 ML: 5 INJECTION INTRAVENOUS at 21:43

## 2021-09-19 RX ADMIN — ACETAMINOPHEN 650 MG: 325 TABLET ORAL at 21:43

## 2021-09-19 RX ADMIN — APIXABAN 5 MG: 5 TABLET, FILM COATED ORAL at 08:29

## 2021-09-19 RX ADMIN — LEVOTHYROXINE SODIUM 125 MCG: 125 TABLET ORAL at 07:51

## 2021-09-19 ASSESSMENT — PAIN SCALES - GENERAL
PAINLEVEL_OUTOF10: 0
PAINLEVEL_OUTOF10: 5

## 2021-09-19 ASSESSMENT — ENCOUNTER SYMPTOMS
GASTROINTESTINAL NEGATIVE: 1
ABDOMINAL DISTENTION: 0
CONSTIPATION: 0
TROUBLE SWALLOWING: 0
BLOOD IN STOOL: 0
SORE THROAT: 0
VOMITING: 0
SHORTNESS OF BREATH: 0
ALLERGIC/IMMUNOLOGIC NEGATIVE: 1
EYES NEGATIVE: 1
SHORTNESS OF BREATH: 1
SINUS PAIN: 0
NAUSEA: 0
DIARRHEA: 0
WHEEZING: 0
COUGH: 0
ABDOMINAL PAIN: 0

## 2021-09-19 NOTE — PROGRESS NOTES
PHARMACY NOTE  Emerita Orourke was ordered [employer] capsules. Per the [address], this medication is non-formulary and not stocked by pharmacy. It has been discontinued. The medication can be reordered at discharge.      Electronically signed by Maricel Johnston, [address] [place] [address] on 9/19/2021 at 12:38 AM

## 2021-09-19 NOTE — ED PROVIDER NOTES
[address] [facility] EMERGENCY DEPT  eMERGENCY dEPARTMENT eNCOUnter      Pt Name: Sarah Beth Valenzuela  MRN: [id number]  [place] 1955  Date of evaluation: 9/18/2021  Provider: Anabella Norman MD    [address] [place] [address]       Chief Complaint   Patient presents with    Atrial Fibrillation     fluttering in chest starting this morning but was intermittent, getting worse this evening.  h/o Afib and feels similar to previous episodes         HISTORY OF PRESENT ILLNESS   (Location/Symptom, Timing/Onset,Context/Setting, Quality, Duration, Modifying Factors, Severity)  Note limiting factors. Sarah Beth Valenzuela is a 78 y.o. female who presents to the emergency department with palpitations. Patient has a history of atrial fibrillation for the last time she was on a rhythm was in 2018. Patient was hospitalized in August with COVID-19. Her heart rate was very low so she was changed to metoprolol from Rythmol. She has been on metoprolol for 2 weeks. She went out of rhythm today. Denies any real chest pain or shortness of breath at this time. On eliquis    HPI    NursingNotes were reviewed. REVIEW OF SYSTEMS    (2-9 systems for level 4, 10 or more for level 5)     Review of Systems   Constitutional: Negative for chills and fever. HENT: Negative for rhinorrhea and sore throat. Respiratory: Negative for shortness of breath. Cardiovascular: Positive for palpitations. Negative for chest pain and leg swelling. Gastrointestinal: Negative for abdominal pain, diarrhea, nausea and vomiting. Genitourinary: Negative for difficulty urinating. Musculoskeletal: Negative for back pain and neck pain. Skin: Negative for rash. Neurological: Negative for weakness and headaches. Psychiatric/Behavioral: Negative for confusion. A complete review of systems was performed and is negative except as noted above in the HPI.        PAST MEDICAL HISTORY     Past Medical History:   Diagnosis Date    Atrial fibrillation ([facility] [address]     Disease of blood Strain:     Difficulty of Paying Living Expenses:    Food Insecurity:     Worried About Running Out of Food in the Last Year:     [address] in the Last Year:    Transportation Needs:     Lack of Transportation (Medical):  Lack of Transportation (Non-Medical):    Physical Activity:     Days of Exercise per Week:     Minutes of Exercise per Session:    Stress:     Feeling of Stress :    Social Connections:     Frequency of Communication with Friends and Family:     Frequency of Social Gatherings with Friends and Family:     Attends Mandaen Services:     Active Member of Clubs or Organizations:     Attends Club or Organization Meetings:     Marital Status:    Intimate Partner Violence:     Fear of Current or Ex-Partner:     Emotionally Abused:     Physically Abused:     Sexually Abused:        SCREENINGS             PHYSICAL EXAM    (up to 7 for level 4, 8 or more for level 5)     ED Triage Vitals [09/18/21 2133]   BP Temp Temp src Pulse Resp SpO2 Height Weight   (!) 181/127 98.2 °F (36.8 °C) -- 136 22 95 % 5' 6\" (1.676 m) 215 lb (97.5 kg)       Physical Exam  Vitals and nursing note reviewed. Constitutional:       General: She is not in acute distress. Appearance: She is well-developed. She is not diaphoretic. HENT:      Head: Normocephalic and atraumatic. Eyes:      Pupils: Pupils are equal, round, and reactive to light. Cardiovascular:      Rate and Rhythm: Tachycardia present. Rhythm irregular. Heart sounds: Normal heart sounds. Pulmonary:      Effort: Pulmonary effort is normal. No respiratory distress. Breath sounds: Normal breath sounds. Abdominal:      General: Bowel sounds are normal. There is no distension. Palpations: Abdomen is soft. Tenderness: There is no abdominal tenderness. Musculoskeletal:         General: Normal range of motion. Cervical back: Normal range of motion and neck supple. Skin:     General: Skin is warm and dry. Findings: No rash. Neurological:      Mental Status: She is alert and oriented to person, place, and time. Cranial Nerves: No cranial nerve deficit. Motor: No abnormal muscle tone. Coordination: Coordination normal.   Psychiatric:         Behavior: Behavior normal.         DIAGNOSTIC RESULTS     EKG: All EKG's are interpreted by the Emergency Department Physician who either signs or Co-signs this chart in the absence of a cardiologist.    Atrial fibrillation rate 126 anterior Q waves nonspecific ST waves    RADIOLOGY:   Non-plain film images such as CT, Ultrasound and MRI are read by the radiologist. Corie Payne images are visualized and preliminarily interpreted by the emergency physician with the below findings:    Cxr; negative    Interpretation per the Radiologist below, if available at the time of this note:    XR CHEST PORTABLE    (Results Pending)         ED BEDSIDE ULTRASOUND:   Performed by ED Physician - none    LABS:  Labs Reviewed   CBC WITH AUTO DIFFERENTIAL - Abnormal; Notable for the following components:       Result Value    MCV 99.3 (*)     MCH 31.3 (*)     MCHC 31.6 (*)     Neutrophils % 48.5 (*)     Monocytes % 10.8 (*)     All other components within normal limits   COMPREHENSIVE METABOLIC PANEL - Abnormal; Notable for the following components:    Glucose 153 (*)     CREATININE 0.4 (*)     Alkaline Phosphatase 140 (*)     ALT 36 (*)     All other components within normal limits   COVID-19, RAPID   BRAIN NATRIURETIC PEPTIDE   APTT   PROTIME-INR   TROPONIN       All other labs were within normal range or not returned as of this dictation.     EMERGENCY DEPARTMENT COURSE and DIFFERENTIALDIAGNOSIS/MDM:   Vitals:    Vitals:    09/18/21 2133 09/18/21 2229   BP: (!) 181/127 (!) 160/100   Pulse: 136 113   Resp: 22 18   Temp: 98.2 °F (36.8 °C)    SpO2: 95% 95%   Weight: 215 lb (97.5 kg)    Height: 5' 6\" (1.676 m)        MDM  D/w hospitalist for admission    CONSULTS:  None    PROCEDURES:  Unless otherwise notedbelow, none     Procedures    FINAL IMPRESSION     1.  Atrial fibrillation with RVR (HCC)          DISPOSITION/PLAN   DISPOSITION        PATIENT REFERRED TO:  @FUP@    DISCHARGE MEDICATIONS:  New Prescriptions    No medications on file          (Please note that portions of this note were completed with a voice recognition program.  Efforts were made to edit the dictations butoccasionally words are mis-transcribed.)    Arnulfo Paulino MD (electronically signed)  AttendingEmergency Physician         Arnulfo Paulino MD  09/18/21 3683

## 2021-09-19 NOTE — H&P
[id number] [facility]      Hospitalist - History & Physical      PCP: Rafi Cuello MD    Date of Admission: 9/18/2021    Date of Service: 9/18/2021    Chief Complaint:  Chest fluttering    History Of Present Illness: The patient is a 78 y.o. female with a PMH of HTN, HLD, hypothyroidism, PAF, and chronic anticoagulation who presented to [address] [place] [address] ED complaining of \"chest fluttering\". Further ED work-up revealed EKG-rapid A-fib with RVR. TSH-6.89. Troponin's are negative. She was found to have mild hyperglycemia at 153. Hospitalist services are being requested to admit patient for observation for atrial fibrillation. The patient states that she has a hx of PAF since 2018. She has been relatively stable, and in SR. She was admitted to [facility] in mid-August for Covid and she states that she was told that she was having severe bradycardia (HR-30's) she was changed from Sotalol to Metoprolol while hospitalized and since home with this medication change. She began to feel \"fluttering\" in her chest at ~2000 and felt that she was likely in atrial fib, due to her previous PAF. She denies chest pain. She reports mild SOA, but thought is was likely r/t post covid symptoms. She denies N/V, chills or diaphoresis. Past Medical History:        Diagnosis Date    Atrial fibrillation ([address]     Disease of blood and blood forming organ     DVT in right leg     Hyperlipidemia 3/17/2015    Hypertension     Hyperthyroidism     Sleep apnea     Umbilical hernia        Past Surgical History:        Procedure Laterality Date    ABDOMEN SURGERY      CARDIAC CATHETERIZATION      COLONOSCOPY      CYSTOSCOPY      HYSTERECTOMY         Home Medications:  Prior to Admission medications    Medication Sig Start Date End Date Taking?  Authorizing Provider   metoprolol succinate (TOPROL XL) 25 MG extended release tablet Take 25 mg by mouth daily   Yes Historical Provider, MD   apixaban (ELIQUIS) 5 MG TABS tablet Take 5 mg by mouth 2 times daily    Historical Provider, MD   olmesartan (BENICAR) 20 MG tablet Take 1 tablet by mouth daily  Patient taking differently: Take 10 mg by mouth daily  7/30/20   CHUCK Us   calcium carbonate (OSCAL) 500 MG TABS tablet Take 500 mg by mouth daily    Historical Provider, MD   Biotin 1 MG CAPS Take by mouth    Historical Provider, MD   cloNIDine (CATAPRES) 0.1 MG tablet Take 1 tablet by mouth 2 times daily as needed for High Blood Pressure (for blood pressure greater than 180/90)  Patient taking differently: Take 0.1 mg by mouth as needed for High Blood Pressure (for blood pressure greater than 180/90)  11/14/19   CHUCK Hinds   spironolactone (ALDACTONE) 25 MG tablet Take 25 mg by mouth daily    Historical Provider, MD   Omega-3 Fatty Acids (FISH OIL) 1000 MG CAPS Take 2,000 mg by mouth 2 times daily     Historical Provider, MD   levothyroxine (SYNTHROID) 125 MCG tablet Take 125 mcg by mouth Daily    Historical Provider, MD       Allergies:    Patient has no known allergies. Social History:    The patient currently lives at home with her   Tobacco:   reports that she has never smoked. She has never used smokeless tobacco.  Alcohol:   reports no history of alcohol use. Illicit Drugs: Denies     Family History:      Problem Relation Age of Onset    Heart Attack Mother     High Blood Pressure Father    [facility] Pacemaker Father     Diabetes Sister     High Blood Pressure Sister        Review of Systems:   Review of Systems   Constitutional: Negative for chills, diaphoresis, fatigue and fever. HENT: Negative for congestion, ear pain, sinus pain, sore throat and trouble swallowing. Eyes: Negative for visual disturbance. Respiratory: Positive for shortness of breath. Negative for cough and wheezing. Cardiovascular: Positive for palpitations. Negative for chest pain and leg swelling.    Gastrointestinal: Negative for abdominal distention, abdominal pain, blood in stool, constipation, diarrhea, nausea and vomiting. Endocrine: Negative for cold intolerance and heat intolerance. Genitourinary: Negative for difficulty urinating, flank pain, frequency and urgency. Musculoskeletal: Negative for arthralgias and myalgias. Neurological: Negative for dizziness, syncope, weakness, light-headedness, numbness and headaches. Hematological: Does not bruise/bleed easily. Psychiatric/Behavioral: Negative for agitation, confusion and dysphoric mood. 14 point review of systems is negative except as specifically addressed above. Physical Examination:  BP (!) 160/100   Pulse 113   Temp 98.2 °F (36.8 °C)   Resp 18   Ht 5' 6\" (1.676 m)   Wt 215 lb (97.5 kg)   SpO2 95%   BMI 34.70 kg/m²   Physical Exam  Vitals reviewed. Constitutional:       General: She is not in acute distress. Appearance: Normal appearance. She is not toxic-appearing or diaphoretic. HENT:      Head: Normocephalic and atraumatic. Right Ear: External ear normal.      Left Ear: External ear normal.      Nose: Nose normal. No congestion or rhinorrhea. Mouth/Throat:      Mouth: Mucous membranes are moist.      Pharynx: Oropharynx is clear. Eyes:      General: No scleral icterus. Extraocular Movements: Extraocular movements intact. Conjunctiva/sclera: Conjunctivae normal.   Cardiovascular:      Rate and Rhythm: Normal rate. Rhythm irregularly irregular. Pulses: Normal pulses. Heart sounds: Normal heart sounds. No murmur heard. No friction rub. No gallop. Comments: A-fib per ECG monitor 80's-110's  Pulmonary:      Effort: Pulmonary effort is normal. No respiratory distress. Breath sounds: Normal breath sounds. No wheezing, rhonchi or rales. Abdominal:      General: Abdomen is flat. Bowel sounds are normal. There is no distension. Palpations: Abdomen is soft. Tenderness: There is no abdominal tenderness. Musculoskeletal:         General: No swelling.  Normal range of motion. Cervical back: Normal range of motion and neck supple. Right lower leg: No edema. Left lower leg: No edema. Skin:     General: Skin is warm and dry. Coloration: Skin is not jaundiced. Findings: No erythema, lesion or rash. Neurological:      General: No focal deficit present. Mental Status: She is alert and oriented to person, place, and time. Mental status is at baseline. Cranial Nerves: No cranial nerve deficit. Sensory: No sensory deficit. Motor: No weakness. Psychiatric:         Mood and Affect: Mood normal.         Behavior: Behavior normal.         Thought Content:  Thought content normal.         Judgment: Judgment normal.          Diagnostic Data:  CBC:  Recent Labs     09/18/21 2145   WBC 5.8   HGB 13.6   HCT 43.1        BMP:  Recent Labs     09/18/21 2145      K 3.7      CO2 25   BUN 15   CREATININE 0.4*   CALCIUM 9.9     Recent Labs     09/18/21 2145   AST 29   ALT 36*   BILITOT 0.3   ALKPHOS 140*     Coag Panel:   Recent Labs     09/18/21  2222   INR 1.12   PROTIME 14.6   APTT 32.2     Cardiac Enzymes:   Recent Labs     09/18/21  2145   [address] <0.01     Urinalysis:  Lab Results   Component Value Date    NITRU Negative 11/14/2019    WBCUA 5 11/14/2019    BACTERIA NEGATIVE 11/14/2019    RBCUA 21 11/14/2019    BLOODU MODERATE 11/14/2019    SPECGRAV 1.006 11/14/2019    GLUCOSEU Negative 11/14/2019       Assessment/Plan:  Principal Problem:    Atrial fibrillation with RVR (HCC)   -Admit to PCU with tele-Full Code   -Continue Cardizem gtt titrate to heartrate goal of 60 BP   -2D echo in the AM   -Vitals per unit routine   -I&O Qshift   -Cardiac diet   -EKG in the AM   -Labs in the AM   -O2 prn   -Daily weights   -VTE prophylaxis   -Up as tolerated   -Continue to monitor for clinical changes and provide supportive care    Active Problems:    Essential hypertension   -Noted   -Continue home meds   -Monitor BP closely-hold for systolic less than 90      Hyperglycemia   -Noted on several encounters   -A1C in the AM      Chronic anticoagulation   -Noted   -Continue home Eliquis      Acquired hypothyroidism   -Noted   -Continue home levothyroxine-dose increase my need to be considered      Hyperlipidemia   -Noted   -Continue Fish oil   -Lipid panel in the AM      VTE prophylaxis-Eliquis    Signed:  CHUCK Foy, 9/18/2021 11:59 PM       Attestation Statement     I have independently seen and examined this patient and agree with the asesment and plan by mid level provider                                                                  Objective:   Vitals: /72   Pulse 87   Temp 96.8 °F (36 °C) (Axillary)   Resp 24   Ht 5' 6\" (1.676 m)   Wt 196 lb 6.4 oz (89.1 kg)   SpO2 95%   BMI 31.70 kg/m²   General appearance: alert, appears stated age and cooperative  Skin: Skin color, texture, turgor normal.   HEENT: Head: Normocephalic, no lesions, without obvious abnormality.   Neck: no adenopathy, no carotid bruit, no JVD and supple, symmetrical, trachea midline  Lungs: clear to auscultation bilaterally  Heart: irregularly irregular rate and rhythm   Abdomen: soft, non-tender; bowel sounds normal; no masses,  no organomegaly  Extremities: extremities normal, atraumatic, no cyanosis or edema  Lymphatic: No significant lymph node enlargement papable  Neurologic: Mental status: Alert, oriented, thought content appropriate      Assessment & Plan:    Afib with RVR- diltiazem drip  PAF- recently sotalol was discontinued to to bradycardia- ever since pt in afib- cardiology consult to adjust meds  Chronic anticoagulation with Maxwell Carrasco MD

## 2021-09-19 NOTE — CONSULTS
[facility]  Cardiology Consult      Requesting MD:  Shayla Nolan MD   Admit Status:  Observation [104]       History obtained from:   [] Patient  [] Other (specify):     Patient:  Marilu Bui     Chief Complaint:   Chief Complaint   Patient presents with    Atrial Fibrillation     fluttering in chest starting this morning but was intermittent, getting worse this evening.  h/o Afib and feels similar to previous episodes         HPI: The patient is a 78 y.o. female with a PMH of HTN, HLD, hypothyroidism, PAF, and chronic anticoagulation who presented to [address] [place] [address] ED complaining of \"chest fluttering\". Further ED work-up revealed EKG-rapid A-fib with RVR. TSH-6.89. Troponin's are negative. She was found to have mild hyperglycemia at 153. Was on sotalol 40 bid which was changed in last pa visit due to her HR low at night  She uses BIPAP. Review of Systems:  Review of Systems   Constitutional: Negative. HENT: Negative. Eyes: Negative. Respiratory: Positive for shortness of breath. Cardiovascular: Positive for palpitations. Gastrointestinal: Negative. Endocrine: Negative. Genitourinary: Negative. Musculoskeletal: Negative. Allergic/Immunologic: Negative. Neurological: Negative. Hematological: Negative. Psychiatric/Behavioral: Negative. All other systems reviewed and are negative.       Cardiac Specific Data:  Specialty Problems        Cardiology Problems    Precordial pain        * (Principal) Atrial fibrillation with RVR (HCC)        Essential hypertension        Hyperlipidemia        PAF (paroxysmal atrial fibrillation) (HCC)              Past Medical History:  Past Medical History:   Diagnosis Date    Atrial fibrillation ([address]     Disease of blood and blood forming organ     DVT in right leg     Hyperlipidemia 3/17/2015    Hypertension     Hyperthyroidism     Sleep apnea     Umbilical hernia         Past Surgical History:  Past Surgical History:   Procedure Laterality Date    ABDOMEN SURGERY      CARDIAC CATHETERIZATION      COLONOSCOPY      CYSTOSCOPY      HYSTERECTOMY         Past Family History:  Family History   Problem Relation Age of Onset    Heart Attack Mother     High Blood Pressure Father     Pacemaker Father     Diabetes Sister     High Blood Pressure Sister        Past Social History:  Social History     Socioeconomic History    Marital status:      Spouse name: Not on file    Number of children: Not on file    Years of education: Not on file    Highest education level: Not on file   Occupational History    Not on file   Tobacco Use    Smoking status: Never Smoker    Smokeless tobacco: Never Used   Vaping Use    Vaping Use: Never used   Substance and Sexual Activity    Alcohol use: No    Drug use: No    Sexual activity: Not on file   Other Topics Concern    Not on file   Social History Narrative    Not on file     Social Determinants of Health     Financial Resource Strain:     Difficulty of Paying Living Expenses:    Food Insecurity:     Worried About [address] in the Last Year:     [facility] in the Last Year:    Transportation Needs:     Lack of Transportation (Medical):  Lack of Transportation (Non-Medical):    Physical Activity:     Days of Exercise per Week:     Minutes of Exercise per Session:    Stress:     Feeling of Stress :    Social Connections:     Frequency of Communication with Friends and Family:     Frequency of Social Gatherings with Friends and Family:     Attends Latter day Services:     Active Member of Clubs or Organizations:     Attends Club or Organization Meetings:     Marital Status:    Intimate Partner Violence:     Fear of Current or Ex-Partner:     Emotionally Abused:     Physically Abused:     Sexually Abused:         Allergies:  No Known Allergies    Home Meds:  Prior to Admission medications    Medication Sig Start Date End Date Taking?  Authorizing Provider   Zinc Sulfate 220 (50 Zn) MG TABS Take 50 mg by mouth daily    Yes Historical Provider, MD   Cholecalciferol (VITAMIN D3 PO) Take 1,000 mg by mouth daily   Yes Historical Provider, MD   metoprolol succinate (TOPROL XL) 25 MG extended release tablet Take 25 mg by mouth daily   Yes Historical Provider, MD   apixaban (ELIQUIS) 5 MG TABS tablet Take 5 mg by mouth 2 times daily   Yes Historical Provider, MD   calcium carbonate (OSCAL) 500 MG TABS tablet Take 500 mg by mouth daily   Yes Historical Provider, MD   Biotin 1 MG CAPS Take by mouth   Yes Historical Provider, MD   spironolactone (ALDACTONE) 25 MG tablet Take 25 mg by mouth daily   Yes Historical Provider, MD   Omega-3 Fatty Acids (FISH OIL) 1000 MG CAPS Take 2,000 mg by mouth 2 times daily    Yes Historical Provider, MD   levothyroxine (SYNTHROID) 125 MCG tablet Take 125 mcg by mouth Daily   Yes Historical Provider, MD   olmesartan (BENICAR) 20 MG tablet Take 1 tablet by mouth daily  Patient taking differently: Take 10 mg by mouth daily  7/30/20   CHUCK Cox   cloNIDine (CATAPRES) 0.1 MG tablet Take 1 tablet by mouth 2 times daily as needed for High Blood Pressure (for blood pressure greater than 180/90)  Patient taking differently: Take 0.1 mg by mouth as needed for High Blood Pressure (for blood pressure greater than 180/90)  11/14/19   CHUCK Schultz       Current Meds:   losartan  50 mg Oral Daily    amiodarone  150 mg IntraVENous Once    sodium chloride flush  5-40 mL IntraVENous 2 times per day    apixaban  5 mg Oral BID    levothyroxine  125 mcg Oral Daily    metoprolol succinate  25 mg Oral Daily    spironolactone  25 mg Oral Daily    sodium chloride flush  5-40 mL IntraVENous 2 times per day       Current Infused Meds:   amiodarone      Followed by   Michael Gauthier amiodarone      dilTIAZem (CARDIZEM) 125 mg in dextrose 5% 125 mL infusion 2.5 mg/hr (09/19/21 0352)    sodium chloride 50 mL/hr at 09/19/21 5171    sodium chloride         Physical Exam:  Vitals:    09/19/21 0633   BP: (!) 148/76   Pulse: 65   Resp: 14   Temp: 96.6 °F (35.9 °C)   SpO2: 96%     No intake or output data in the 24 hours ending 09/19/21 0933  Estimated body mass index is 31.7 kg/m² as calculated from the following:    Height as of this encounter: 5' 6\" (1.676 m). Weight as of this encounter: 196 lb 6.4 oz (89.1 kg). Physical Exam  Vitals reviewed. Constitutional:       Appearance: Normal appearance. HENT:      Head: Normocephalic. Right Ear: Tympanic membrane normal.      Left Ear: Tympanic membrane normal.      Nose: Nose normal.      Mouth/Throat:      Mouth: Mucous membranes are moist.   Eyes:      Pupils: Pupils are equal, round, and reactive to light. Cardiovascular:      Rate and Rhythm: Rhythm irregular. Pulses: Normal pulses. Heart sounds: Normal heart sounds. Pulmonary:      Effort: Pulmonary effort is normal.      Breath sounds: Normal breath sounds. Abdominal:      General: Abdomen is flat. Musculoskeletal:         General: Normal range of motion. Cervical back: Normal range of motion and neck supple. Skin:     General: Skin is warm. Neurological:      General: No focal deficit present. Mental Status: She is alert. Psychiatric:         Mood and Affect: Mood normal.         Labs:  Recent Labs     09/18/21 2145 09/19/21  0204   WBC 5.8 5.0   HGB 13.6 13.3    178       Recent Labs     09/18/21 2145 09/19/21  0203    141   K 3.7 4.0    105   CO2 25 26   BUN 15 13   CREATININE 0.4* 0.4*   LABGLOM >60 >60   MG 1.9  --    CALCIUM 9.9 9.5       CK, CKMB, Troponin: @LABRCNT (CKTOTAL:3, CKMB:3, TROPONINI:3)@    Last 3 BNP:  No results for input(s): BNP in the last 72 hours. IMAGING:  No results found. Assessment:  1. PAF  2. HTN  3. ABEBA      Recommendations:  Try iv amio to achieve sinus  Pt will need sotalol to go home  Continue eliquiz.   Echo for Ef    D/w pt

## 2021-09-19 NOTE — PROGRESS NOTES
Exam  Vitals and nursing note reviewed. Constitutional:       Appearance: Normal appearance. HENT:      Head: Normocephalic and atraumatic. Mouth/Throat:      Mouth: Mucous membranes are moist.   Eyes:      Pupils: Pupils are equal, round, and reactive to light. Cardiovascular:      Rate and Rhythm: Normal rate. Heart sounds: Normal heart sounds. No murmur heard. No gallop. Pulmonary:      Effort: Pulmonary effort is normal.   Abdominal:      Palpations: Abdomen is soft. Musculoskeletal:      Right hand: Normal.      Cervical back: Normal range of motion. Skin:     Capillary Refill: Capillary refill takes less than 2 seconds. Coloration: Skin is not cyanotic or jaundiced. Neurological:      Mental Status: She is alert and oriented to person, place, and time. Cranial Nerves: No cranial nerve deficit. Medications:      amiodarone      dilTIAZem (CARDIZEM) 125 mg in dextrose 5% 125 mL infusion 2.5 mg/hr (09/19/21 0352)    sodium chloride 50 mL/hr at 09/19/21 0826    sodium chloride        losartan  50 mg Oral Daily    sodium chloride flush  5-40 mL IntraVENous 2 times per day    apixaban  5 mg Oral BID    levothyroxine  125 mcg Oral Daily    metoprolol succinate  25 mg Oral Daily    spironolactone  25 mg Oral Daily    sodium chloride flush  5-40 mL IntraVENous 2 times per day     sodium chloride flush, sodium chloride, acetaminophen **OR** acetaminophen  ADULT DIET;  Regular; Low Fat/Low Chol/High Fiber/HOMAR     Lab and other Data:     Recent Labs     09/18/21 2145 09/19/21  0204   WBC 5.8 5.0   HGB 13.6 13.3    178     Recent Labs     09/18/21 2145 09/19/21  0203    141   K 3.7 4.0    105   CO2 25 26   BUN 15 13   CREATININE 0.4* 0.4*   GLUCOSE 153* 117*     Recent Labs     09/18/21 2145 09/19/21  0203   AST 29 28   ALT 36* 34*   BILITOT 0.3 0.3   ALKPHOS 140* 121*     Troponin T:   Recent Labs     09/18/21 2145 09/19/21  0203   TROPONINI <0.01 <0.01     Pro-BNP: No results for input(s): BNP in the last 72 hours. INR:   Recent Labs     09/18/21  2222   INR 1.12     UA:No results for input(s): NITRITE, COLORU, PHUR, LABCAST, WBCUA, RBCUA, MUCUS, TRICHOMONAS, YEAST, BACTERIA, CLARITYU, SPECGRAV, LEUKOCYTESUR, UROBILINOGEN, BILIRUBINUR, BLOODU, GLUCOSEU, AMORPHOUS in the last 72 hours. Invalid input(s): Scharlenmartha Tyrese  A1C:   Recent Labs     09/19/21  0204   LABA1C 5.7     ABG:No results for input(s): PHART, NJT6QMK, PO2ART, LQI7VPS, BEART, HGBAE, H6ZJEYEH, CARBOXHGBART in the last 72 hours. RAD:   XR CHEST PORTABLE    Result Date: 9/19/2021  1. No acute disease. Signed by Dr Makayla Mullen             Assessment/Plan   Principal Problem:    Atrial fibrillation with RVR [facility])   Cardiology consult with recommendations   Echo for EF   cordorone added by cardio    Active Problems:    Essential hypertension    Chronic anticoagulation    Acquired hypothyroidism   Continue same rx recheck lab as outpt     Hyperlipidemia    Resolved Problems:    * No resolved hospital problems.  *        DVT Prophylaxis 49[address] CHUCK Kate - CNP, 9/19/2021 4:00 PM

## 2021-09-20 PROBLEM — I48.91 ATRIAL FIBRILLATION (HCC): Status: ACTIVE | Noted: 2021-09-20

## 2021-09-20 PROCEDURE — 2580000003 HC RX 258: Performed by: INTERNAL MEDICINE

## 2021-09-20 PROCEDURE — G0378 HOSPITAL OBSERVATION PER HR: HCPCS

## 2021-09-20 PROCEDURE — 6360000002 HC RX W HCPCS: Performed by: INTERNAL MEDICINE

## 2021-09-20 PROCEDURE — 2140000000 HC CCU INTERMEDIATE R&B

## 2021-09-20 PROCEDURE — 6370000000 HC RX 637 (ALT 250 FOR IP): Performed by: HOSPITALIST

## 2021-09-20 PROCEDURE — 99232 SBSQ HOSP IP/OBS MODERATE 35: CPT | Performed by: INTERNAL MEDICINE

## 2021-09-20 PROCEDURE — 6370000000 HC RX 637 (ALT 250 FOR IP): Performed by: INTERNAL MEDICINE

## 2021-09-20 PROCEDURE — 6370000000 HC RX 637 (ALT 250 FOR IP): Performed by: NURSE PRACTITIONER

## 2021-09-20 PROCEDURE — 6370000000 HC RX 637 (ALT 250 FOR IP): Performed by: STUDENT IN AN ORGANIZED HEALTH CARE EDUCATION/TRAINING PROGRAM

## 2021-09-20 RX ORDER — DOCUSATE SODIUM 100 MG/1
100 CAPSULE, LIQUID FILLED ORAL DAILY
Status: DISCONTINUED | OUTPATIENT
Start: 2021-09-20 | End: 2021-09-21 | Stop reason: HOSPADM

## 2021-09-20 RX ORDER — SOTALOL HYDROCHLORIDE 80 MG/1
40 TABLET ORAL 2 TIMES DAILY
Status: DISCONTINUED | OUTPATIENT
Start: 2021-09-20 | End: 2021-09-21

## 2021-09-20 RX ADMIN — APIXABAN 5 MG: 5 TABLET, FILM COATED ORAL at 09:00

## 2021-09-20 RX ADMIN — LOSARTAN POTASSIUM 50 MG: 50 TABLET, FILM COATED ORAL at 09:00

## 2021-09-20 RX ADMIN — DOCUSATE SODIUM 100 MG: 100 CAPSULE, LIQUID FILLED ORAL at 10:39

## 2021-09-20 RX ADMIN — DOCUSATE SODIUM 100 MG: 100 CAPSULE, LIQUID FILLED ORAL at 10:40

## 2021-09-20 RX ADMIN — SPIRONOLACTONE 25 MG: 25 TABLET ORAL at 09:00

## 2021-09-20 RX ADMIN — SOTALOL HYDROCHLORIDE 40 MG: 80 TABLET ORAL at 18:27

## 2021-09-20 RX ADMIN — METOPROLOL SUCCINATE 25 MG: 25 TABLET, EXTENDED RELEASE ORAL at 09:00

## 2021-09-20 RX ADMIN — AMIODARONE HYDROCHLORIDE 0.5 MG/MIN: 50 INJECTION, SOLUTION INTRAVENOUS at 13:26

## 2021-09-20 RX ADMIN — APIXABAN 5 MG: 5 TABLET, FILM COATED ORAL at 22:04

## 2021-09-20 RX ADMIN — LEVOTHYROXINE SODIUM 125 MCG: 125 TABLET ORAL at 08:58

## 2021-09-20 ASSESSMENT — PAIN SCALES - GENERAL: PAINLEVEL_OUTOF10: 0

## 2021-09-20 NOTE — PROGRESS NOTES
[facility]        Hospitalist Progress Note  9/20/2021 1:35 PM  Subjective:   Admit Date: 9/18/2021  PCP: Tona Godoy MD    Chief Complaint: Palpitations    Subjective: Patient was seen and examined by the bedside this morning. She has no new complaint. She denied chest pain, shortness of breath, leg swelling, orthopnea, PND. Cumulative Hospital History:  Patient is a 50-year-old  female with medical history significant for hypertension, hypothyroidism, atrial fibrillation [on chronic anticoagulation] who presented to the ED with history of palpitations. On admission, patient was noted to be in A. fib with rates between . ROS: 14 point review of systems is negative except as specifically addressed above. ADULT DIET;  Regular; Low Fat/Low Chol/High Fiber/HOMAR    Intake/Output Summary (Last 24 hours) at 9/20/2021 1335  Last data filed at 9/20/2021 0519  Gross per 24 hour   Intake 1945.4 ml   Output --   Net 1945.4 ml     Medications:   amiodarone 0.5 mg/min (09/20/21 1326)    dilTIAZem (CARDIZEM) 125 mg in dextrose 5% 125 mL infusion Stopped (09/19/21 2306)    sodium chloride 50 mL/hr at 09/19/21 0826    sodium chloride       Current Facility-Administered Medications   Medication Dose Route Frequency Provider Last Rate Last Admin    docusate sodium (COLACE) capsule 100 mg  100 mg Oral Daily Zainab Chang MD   100 mg at 09/20/21 1040    losartan (COZAAR) tablet 50 mg  50 mg Oral Daily CHUCK Duran   50 mg at 09/20/21 0900    amiodarone (CORDARONE) 450 mg in dextrose 5 % 250 mL infusion  0.5 mg/min IntraVENous Continuous Bibi Wilson MD 16.7 mL/hr at 09/20/21 1326 0.5 mg/min at 09/20/21 1326    dilTIAZem 125 mg in dextrose 5 % 125 mL infusion  5-15 mg/hr IntraVENous Continuous Shahnaz Dimas MD   Stopped at 09/19/21 2306    0.9 % sodium chloride infusion   IntraVENous Continuous Shahnaz Dimas MD 50 mL/hr at 09/19/21 8772 New Bag at 09/19/21 0826    sodium chloride flush 0.9 % injection 5-40 mL  5-40 mL IntraVENous 2 times per day Bessy Justin MD        apixaban Jayant White) tablet 5 mg  5 mg Oral BID Bessy Justin MD   5 mg at 09/20/21 0900    levothyroxine (SYNTHROID) tablet 125 mcg  125 mcg Oral Daily Bessy Justin MD   125 mcg at 09/20/21 0858    metoprolol succinate (TOPROL XL) extended release tablet 25 mg  25 mg Oral Daily Bessy Justin MD   25 mg at 09/20/21 0900    spironolactone (ALDACTONE) tablet 25 mg  25 mg Oral Daily Bessy Justin MD   25 mg at 09/20/21 0900    sodium chloride flush 0.9 % injection 5-40 mL  5-40 mL IntraVENous 2 times per day Bessy Jutsin MD   10 mL at 09/19/21 2143    sodium chloride flush 0.9 % injection 5-40 mL  5-40 mL IntraVENous PRN Bessy Justin MD        0.9 % sodium chloride infusion  25 mL IntraVENous PRN Bessy Justin MD        acetaminophen (TYLENOL) tablet 650 mg  650 mg Oral Q6H PRN Bessy Justin MD   650 mg at 09/19/21 2143    Or    acetaminophen (TYLENOL) suppository 650 mg  650 mg Rectal Q6H PRN Bessy Justin MD            Labs:     Recent Labs     09/18/21 2145 09/19/21  0204   WBC 5.8 5.0   RBC 4.34 4.30   HGB 13.6 13.3   HCT 43.1 41.7   MCV 99.3* 97.0   MCH 31.3* 30.9   MCHC 31.6* 31.9*    178     Recent Labs     09/18/21 2145 09/19/21  0203    141   K 3.7 4.0   ANIONGAP 11 10    105   CO2 25 26   BUN 15 13   CREATININE 0.4* 0.4*   GLUCOSE 153* 117*   CALCIUM 9.9 9.5     Recent Labs     09/18/21 2145   MG 1.9     Recent Labs     09/18/21 2145 09/19/21  0203   AST 29 28   ALT 36* 34*   BILITOT 0.3 0.3   ALKPHOS 140* 121*     ABGs:No results for input(s): PH, PO2, PCO2, HCO3, BE, O2SAT in the last 72 hours.   Troponin T:   Recent Labs     09/18/21  2145 09/19/21  0203   TROPONINI <0.01 <0.01     INR:   Recent Labs     09/18/21  2222   INR 1.12     Lactic Acid: No results for input(s): LACTA in the last 72 hours. Objective:   Vitals: /65   Pulse 60   Temp 97.7 °F (36.5 °C) (Oral)   Resp 19   Ht 5' 6\" (1.676 m)   Wt 196 lb 6.4 oz (89.1 kg)   SpO2 99%   BMI 31.70 kg/m²   24HR INTAKE/OUTPUT:      Intake/Output Summary (Last 24 hours) at 9/20/2021 1335  Last data filed at 9/20/2021 0519  Gross per 24 hour   Intake 1945.4 ml   Output --   Net 1945.4 ml     General appearance: Elderly  female seen lying down in no acute cardiopulmonary distress. Alert and cooperative with exam  HEENT: atraumatic, eyes with clear conjunctiva and normal lids, pupils and irises normal, external ears and nose are normal, lips normal  Lungs: no increased work of breathing, CTA B, no wheezing, no crackles  Heart: RRR, S1-S2 heard no MRG  Abdomen: Soft, NT, ND, BS +  Extremities: No edema or cyanosis. Peripheral pulses present  Neurologic: no focal neurologic deficits, normal sensation, alert and oriented, affect and mood appropriate  Skin: no rashes, nodules    Assessment and Plan:   Principal Problem:    Atrial fibrillation with RVR ([facility])  Active Problems:    Essential hypertension    Chronic anticoagulation    Acquired hypothyroidism    Hyperlipidemia    Hyperglycemia    Atrial fibrillation ([address]  Resolved Problems:    * No resolved hospital problems.  *    Plan:  #Atrial fibrillation  -Rate controlled  -Transition of diltiazem drip  -Continue apixaban  -Cardiology team on board with management    #Hypertension  -Continue losartan, metoprolol, Aldactone    #Hypothyroidism  -Continue levothyroxine    Advance Directive: Full Code    DVT prophylaxis: Patient on apixaban    Discharge planning: TBD      Signed:  Dena Clemons MD 9/20/2021 1:35 PM  Rounding Hospitalist

## 2021-09-21 VITALS
HEART RATE: 72 BPM | WEIGHT: 196.4 LBS | TEMPERATURE: 98.1 F | OXYGEN SATURATION: 98 % | HEIGHT: 66 IN | BODY MASS INDEX: 31.57 KG/M2 | SYSTOLIC BLOOD PRESSURE: 134 MMHG | DIASTOLIC BLOOD PRESSURE: 77 MMHG | RESPIRATION RATE: 18 BRPM

## 2021-09-21 LAB
EKG P AXIS: 101 DEGREES
EKG P AXIS: 60 DEGREES
EKG P-R INTERVAL: 156 MS
EKG P-R INTERVAL: 196 MS
EKG Q-T INTERVAL: 296 MS
EKG Q-T INTERVAL: 436 MS
EKG QRS DURATION: 98 MS
EKG QRS DURATION: 98 MS
EKG QTC CALCULATION (BAZETT): 404 MS
EKG QTC CALCULATION (BAZETT): 417 MS
EKG T AXIS: 101 DEGREES
EKG T AXIS: 61 DEGREES

## 2021-09-21 PROCEDURE — 2500000003 HC RX 250 WO HCPCS

## 2021-09-21 PROCEDURE — 33285 INSJ SUBQ CAR RHYTHM MNTR: CPT

## 2021-09-21 PROCEDURE — 99232 SBSQ HOSP IP/OBS MODERATE 35: CPT | Performed by: INTERNAL MEDICINE

## 2021-09-21 PROCEDURE — 33285 INSJ SUBQ CAR RHYTHM MNTR: CPT | Performed by: INTERNAL MEDICINE

## 2021-09-21 PROCEDURE — 6370000000 HC RX 637 (ALT 250 FOR IP): Performed by: HOSPITALIST

## 2021-09-21 PROCEDURE — 93010 ELECTROCARDIOGRAM REPORT: CPT | Performed by: INTERNAL MEDICINE

## 2021-09-21 PROCEDURE — 2580000003 HC RX 258: Performed by: HOSPITALIST

## 2021-09-21 PROCEDURE — 6370000000 HC RX 637 (ALT 250 FOR IP): Performed by: STUDENT IN AN ORGANIZED HEALTH CARE EDUCATION/TRAINING PROGRAM

## 2021-09-21 PROCEDURE — 6370000000 HC RX 637 (ALT 250 FOR IP): Performed by: INTERNAL MEDICINE

## 2021-09-21 PROCEDURE — 6370000000 HC RX 637 (ALT 250 FOR IP): Performed by: NURSE PRACTITIONER

## 2021-09-21 PROCEDURE — 0JH632Z INSERTION OF MONITORING DEVICE INTO CHEST SUBCUTANEOUS TISSUE AND FASCIA, PERCUTANEOUS APPROACH: ICD-10-PCS | Performed by: INTERNAL MEDICINE

## 2021-09-21 PROCEDURE — 93005 ELECTROCARDIOGRAM TRACING: CPT | Performed by: INTERNAL MEDICINE

## 2021-09-21 PROCEDURE — C1764 EVENT RECORDER, CARDIAC: HCPCS

## 2021-09-21 RX ORDER — PROPAFENONE HYDROCHLORIDE 150 MG/1
150 TABLET, FILM COATED ORAL EVERY 8 HOURS SCHEDULED
Qty: 90 TABLET | Refills: 3 | Status: SHIPPED | OUTPATIENT
Start: 2021-09-21

## 2021-09-21 RX ORDER — PROPAFENONE HYDROCHLORIDE 150 MG/1
150 TABLET, FILM COATED ORAL 2 TIMES DAILY
Status: DISCONTINUED | OUTPATIENT
Start: 2021-09-21 | End: 2021-09-21

## 2021-09-21 RX ORDER — PROPAFENONE HYDROCHLORIDE 150 MG/1
150 TABLET, FILM COATED ORAL EVERY 8 HOURS SCHEDULED
Status: DISCONTINUED | OUTPATIENT
Start: 2021-09-21 | End: 2021-09-21 | Stop reason: HOSPADM

## 2021-09-21 RX ORDER — SODIUM CHLORIDE 9 MG/ML
INJECTION, SOLUTION INTRAVENOUS CONTINUOUS
Status: DISCONTINUED | OUTPATIENT
Start: 2021-09-21 | End: 2021-09-21 | Stop reason: HOSPADM

## 2021-09-21 RX ORDER — PROPAFENONE HYDROCHLORIDE 150 MG/1
150 TABLET, FILM COATED ORAL 2 TIMES DAILY
Qty: 90 TABLET | Refills: 3 | Status: SHIPPED | OUTPATIENT
Start: 2021-09-21 | End: 2021-09-21 | Stop reason: HOSPADM

## 2021-09-21 RX ADMIN — APIXABAN 5 MG: 5 TABLET, FILM COATED ORAL at 08:23

## 2021-09-21 RX ADMIN — LOSARTAN POTASSIUM 50 MG: 50 TABLET, FILM COATED ORAL at 08:23

## 2021-09-21 RX ADMIN — DOCUSATE SODIUM 100 MG: 100 CAPSULE, LIQUID FILLED ORAL at 08:23

## 2021-09-21 RX ADMIN — PROPAFENONE HYDROCHLORIDE 150 MG: 150 TABLET, FILM COATED ORAL at 11:25

## 2021-09-21 RX ADMIN — LEVOTHYROXINE SODIUM 125 MCG: 125 TABLET ORAL at 08:23

## 2021-09-21 RX ADMIN — Medication 10 ML: at 08:26

## 2021-09-21 RX ADMIN — SPIRONOLACTONE 25 MG: 25 TABLET ORAL at 08:23

## 2021-09-21 ASSESSMENT — PAIN SCALES - GENERAL: PAINLEVEL_OUTOF10: 0

## 2021-09-21 NOTE — PROGRESS NOTES
Cardiology Progress Note Jose J Reeder MD      Patient:  Stalin Grande  [id number]    Patient Active Problem List    Diagnosis Date Noted    Atrial fibrillation with RVR ([address] 03/17/2018     Priority: High    Essential hypertension      Priority: High    Rhinorrhea      Priority: High    Abnormal stress echo      Priority: High    Precordial pain      Priority: High    Atrial fibrillation ([address] 09/20/2021     Priority: Low    Hyperglycemia 09/19/2021     Priority: Low    Chronic anticoagulation 07/29/2020     Priority: Low    PAF (paroxysmal atrial fibrillation) ([address] 05/25/2017     Priority: Low    Hyperlipidemia 03/17/2015     Priority: Low    Acquired hypothyroidism 02/25/2015     Priority: Low       Admit Date:  9/18/2021    Admission Problem List: Present on Admission:   Atrial fibrillation with RVR ([facility]   Essential hypertension   Chronic anticoagulation   Hyperlipidemia   Acquired hypothyroidism   Hyperglycemia   Atrial fibrillation [facility])      Cardiac Specific Data:  Specialty Problems        Cardiology Problems    Precordial pain        * (Principal) Atrial fibrillation with RVR (HCC)        Essential hypertension        Hyperlipidemia        PAF (paroxysmal atrial fibrillation) (HCC)        Atrial fibrillation ([address]            1. Paroxysmal atrial fibrillation previously on Betapace (held for bradycardia) with recurrent atrial fibrillation, on Eliquis, normal LV systolic function (echo 7/75/8816). 2.  Hypertension. 3.  Obstructive sleep apnea with CPAP utilization. Subjective:  Ms. Ginette Griffin had episodes of sinus bradycardia overnight and at 540 this a.m. with rates around 35 to 37 bpm.  Initiated on Betapace at 40 mg twice daily yesterday.     Objective:   /71   Pulse 61   Temp 97.5 °F (36.4 °C) (Oral)   Resp 18   Ht 5' 6\" (1.676 m)   Wt 196 lb 6.4 oz (89.1 kg)   SpO2 98%   BMI 31.70 kg/m²       Intake/Output Summary (Last 24 hours) at 9/21/2021 1148  Last data filed at 9/21/2021 8398  Gross per 24 hour   Intake 610 ml   Output --   Net 610 ml       Prior to Admission medications    Medication Sig Start Date End Date Taking?  Authorizing Provider   Zinc Sulfate 220 (50 Zn) MG TABS Take 50 mg by mouth daily    Yes Historical Provider, MD   Cholecalciferol (VITAMIN D3 PO) Take 1,000 mg by mouth daily   Yes Historical Provider, MD   metoprolol succinate (TOPROL XL) 25 MG extended release tablet Take 25 mg by mouth daily   Yes Historical Provider, MD   apixaban (ELIQUIS) 5 MG TABS tablet Take 5 mg by mouth 2 times daily   Yes Historical Provider, MD   calcium carbonate (OSCAL) 500 MG TABS tablet Take 500 mg by mouth daily   Yes Historical Provider, MD   Biotin 1 MG CAPS Take by mouth   Yes Historical Provider, MD   spironolactone (ALDACTONE) 25 MG tablet Take 25 mg by mouth daily   Yes Historical Provider, MD   Omega-3 Fatty Acids (FISH OIL) 1000 MG CAPS Take 2,000 mg by mouth 2 times daily    Yes Historical Provider, MD   levothyroxine (SYNTHROID) 125 MCG tablet Take 125 mcg by mouth Daily   Yes Historical Provider, MD   olmesartan (BENICAR) 20 MG tablet Take 1 tablet by mouth daily  Patient taking differently: Take 10 mg by mouth daily  7/30/20   CodiestCHUCK Stacy   cloNIDine (CATAPRES) 0.1 MG tablet Take 1 tablet by mouth 2 times daily as needed for High Blood Pressure (for blood pressure greater than 180/90)  Patient taking differently: Take 0.1 mg by mouth as needed for High Blood Pressure (for blood pressure greater than 180/90)  11/14/19   CHUCK Fischer        chlorhexidine gluconate   Topical Once    propafenone  150 mg Oral BID    docusate sodium  100 mg Oral Daily    losartan  50 mg Oral Daily    sodium chloride flush  5-40 mL IntraVENous 2 times per day    apixaban  5 mg Oral BID    levothyroxine  125 mcg Oral Daily    spironolactone  25 mg Oral Daily    sodium chloride flush  5-40 mL IntraVENous 2 times per day       TELEMETRY: Sinus     Physical Exam:      Physical Exam  Constitutional:       General: She is not in acute distress. Appearance: She is not diaphoretic. HENT:      Mouth/Throat:      Pharynx: No oropharyngeal exudate. Eyes:      General: No scleral icterus. Right eye: No discharge. Left eye: No discharge. Neck:      Thyroid: No thyromegaly. Vascular: No JVD. Cardiovascular:      Rate and Rhythm: Normal rate and regular rhythm. No extrasystoles are present. Heart sounds: Normal heart sounds, S1 normal and S2 normal. No murmur heard. No systolic murmur is present. No diastolic murmur is present. No friction rub. No gallop. No S3 or S4 sounds. Comments: No JVD  No edema    Pulmonary:      Effort: Pulmonary effort is normal. No respiratory distress. Breath sounds: Normal breath sounds. No wheezing or rales. Chest:      Chest wall: No tenderness. Abdominal:      General: Bowel sounds are normal. There is no distension. Palpations: Abdomen is soft. There is no mass. Tenderness: There is no abdominal tenderness. There is no guarding or rebound. Hernia: No hernia is present. Comments: No palpable organomegaly   Musculoskeletal:         General: Normal range of motion. Skin:     General: Skin is warm. Coloration: Skin is not pale. Findings: No rash. Neurological:      Mental Status: She is alert and oriented to person, place, and time. Cranial Nerves: No cranial nerve deficit.       Deep Tendon Reflexes: Reflexes normal.                 Lab Data:  CBC:   Recent Labs     09/18/21 2145 09/19/21  0204   WBC 5.8 5.0   HGB 13.6 13.3   HCT 43.1 41.7   MCV 99.3* 97.0    178     BMP:   Recent Labs     09/18/21 2145 09/19/21  0203    141   K 3.7 4.0    105   CO2 25 26   BUN 15 13   CREATININE 0.4* 0.4*     LIVER PROFILE:   Recent Labs     09/18/21 2145 09/19/21  0203   AST 29 28   ALT 36* 34*   BILITOT 0.3 0.3   ALKPHOS 140* 121*     PT/INR:   Recent Labs 09/18/21 2222   PROTIME 14.6   INR 1.12     APTT:   Recent Labs     09/18/21 2222   APTT 32.2     BNP:  No results for input(s): BNP in the last 72 hours. CK, CKMB, Troponin: @LABRCNT (CKTOTAL:3, CKMB:3, TROPONINI:3)@    IMAGING:  ECHO Complete 2D W Doppler W Color    Result Date: 9/19/2021  Transthoracic Echocardiography Report (TTE)  Demographics   Patient Name  Tee Thompson Date of Study          09/19/2021   MRN           [id number]         Gender                 Female   Date of Birth 1955     Room Number            L-0714   Age           78 year(s)   Height:       66 inches      Referring Physician    Hugo Canales MD   Weight:       196 pounds     Sonographer            Lucila Zaragoza RDCS   BSA:          1.98 m^2       Interpreting Physician Hugo Canales MD   BMI:          31.64 kg/m^2  Procedure Type of Study   TTE procedure:ECHO 2D W/DOPPLER/COLOR/CONTRAST. Study Location: Echo Lab Technical Quality: Limited visualization due to poor acoustical window. Patient Status: Inpatient Contrast Medium: Definity. Amount - 4 ml Rhythm: Atrial fibrillation BP: 148/76 mmHg  Conclusions   Summary  Normal size left atrium. Normal left ventricular size with preserved LV function and an estimated  ejection fraction of approximately 55-60%. No evidence of left ventricular mass or thrombus noted. Normal right atrial dimension with no evidence of thrombus or mass noted. Normal right ventricular size with preserved RV function. Structurally normal mitral valve with normal leaflet mobility. No evidence  of mitral valve stenosis or mild mitral regurgitation. Aortic valve appears to be tricuspid. Structurally normal aortic valve. No significant aortic regurgitation or stenosis is noted. Tricuspid valve is structurally normal.  No evidence of tricuspid regurgitation.    Signature   ----------------------------------------------------------------  Electronically signed by Hugo Canales MD(Interpreting  physician) on 09/19/2021 02:11 PM  ----------------------------------------------------------------   Findings   Mitral Valve  Structurally normal mitral valve with normal leaflet mobility. No evidence  of mitral valve stenosis or mild mitral regurgitation. Aortic Valve  Aortic valve appears to be tricuspid. Structurally normal aortic valve. No significant aortic regurgitation or stenosis is noted. Tricuspid Valve  Tricuspid valve is structurally normal.  No evidence of tricuspid regurgitation. Left Atrium  Normal size left atrium. Left Ventricle  Normal left ventricular size with preserved LV function and an estimated  ejection fraction of approximately 55-60%. No evidence of left ventricular mass or thrombus noted. Right Atrium  Normal right atrial dimension with no evidence of thrombus or mass noted. Right Ventricle  Normal right ventricular size with preserved RV function. Allergies   - No known allergies. M-Mode Measurements (cm)   LVIDd: 3.89 cm                           LVIDs: 2.98 cm  IVSd: 0.86 cm  LVPWd: 0.92 cm                           AO Root Dimension: 2 cm  Rt. Vent. Dimension: 2.19 cm             LA: 3.3 cm  % Ejection Fraction: 55 %                LVOT: 2.1 cm  Doppler Measurements:   AV Peak Velocity:119 cm/s          MV Peak E-Wave: 66 cm/s  AV Peak Gradient: 5.66 mmHg                                     MV Peak Gradient: 1.74 mmHg  TR Velocity:184 cm/s  TR Gradient:13.54 mmHg  Estimated RAP:5 mmHg  RVSP:18 mmHg      XR CHEST PORTABLE    Result Date: 9/19/2021  XR CHEST PORTABLE 9/19/2021 10:50 AM History: Chest pain. Atrial fibrillation with fluttering sensation. Heart palpitations. Hospitalized with Covid 19 last month. Portable chest x-ray. Comparison is made with March 24, 2018. Heart size is within normal limits The mediastinum has a normal appearance. The lungs are adequately expanded with no pneumonia or pneumothorax.  There is mild chronic interstitial disease with scattered calcified granulomas. There is no significant pleural fluid. No congestive failure changes. 1. No acute disease. Signed by Dr Michelle Hirsch and Plan:    65-year-old female patient with past medical history of paroxysmal atrial fibrillation well-controlled on Betapace and Eliquis, obstructive sleep apnea with CPAP utilization presenting with recurrent atrial fibrillation after recent holding of Betapace due to nighttime asymptomatic bradycardia.     1. Recurrent bradycardic episodes that are asymptomatic overnight on Betapace low-dose of 40 mg. Will discontinue Betapace and switch to Rythmol 150 mg 3 times daily. Increase cost of Multaq. Will place ILR going forward. Risks, benefits, alternatives of implantable loop recorder placement discussed with the patient and full informed consent obtained.   Acceptable Mallampati score  Consent for moderate conscious sedation  ASA 3      Letty Ramires MD, MD 9/21/2021 11:48 AM

## 2021-09-21 NOTE — PROGRESS NOTES
Julissa Hernández MD   10 mL at 09/21/21 0826    apixaban (ELIQUIS) tablet 5 mg  5 mg Oral BID Haris Moss MD   5 mg at 09/21/21 8345    levothyroxine (SYNTHROID) tablet 125 mcg  125 mcg Oral Daily Haris Moss MD   125 mcg at 09/21/21 2642    spironolactone (ALDACTONE) tablet 25 mg  25 mg Oral Daily Haris Moss MD   25 mg at 09/21/21 9946    sodium chloride flush 0.9 % injection 5-40 mL  5-40 mL IntraVENous 2 times per day Haris Moss MD   10 mL at 09/19/21 2143    sodium chloride flush 0.9 % injection 5-40 mL  5-40 mL IntraVENous PRN Haris Moss MD        0.9 % sodium chloride infusion  25 mL IntraVENous PRN Haris Moss MD        acetaminophen (TYLENOL) tablet 650 mg  650 mg Oral Q6H PRN Haris Moss MD   650 mg at 09/19/21 2143    Or    acetaminophen (TYLENOL) suppository 650 mg  650 mg Rectal Q6H PRN Haris Moss MD            Labs:     Recent Labs     09/18/21 2145 09/19/21  0204   WBC 5.8 5.0   RBC 4.34 4.30   HGB 13.6 13.3   HCT 43.1 41.7   MCV 99.3* 97.0   MCH 31.3* 30.9   MCHC 31.6* 31.9*    178     Recent Labs     09/18/21 2145 09/19/21 0203    141   K 3.7 4.0   ANIONGAP 11 10    105   CO2 25 26   BUN 15 13   CREATININE 0.4* 0.4*   GLUCOSE 153* 117*   CALCIUM 9.9 9.5     Recent Labs     09/18/21 2145   MG 1.9     Recent Labs     09/18/21 2145 09/19/21 0203   AST 29 28   ALT 36* 34*   BILITOT 0.3 0.3   ALKPHOS 140* 121*     ABGs:No results for input(s): PH, PO2, PCO2, HCO3, BE, O2SAT in the last 72 hours. Troponin T:   Recent Labs     09/18/21  2145 09/19/21  0203   TROPONINI <0.01 <0.01     INR:   Recent Labs     09/18/21  2222   INR 1.12     Lactic Acid: No results for input(s): LACTA in the last 72 hours.     Objective:   Vitals: BP (!) 142/69   Pulse 50   Temp 97.5 °F (36.4 °C) (Oral)   Resp 18   Ht 5' 6\" (1.676 m)   Wt 196 lb 6.4 oz (89.1 kg)   SpO2 97%   BMI 31.70 kg/m²   24HR INTAKE/OUTPUT:      Intake/Output Summary (Last 24 hours) at 9/21/2021 1415  Last data filed at 9/21/2021 1731  Gross per 24 hour   Intake 250 ml   Output --   Net 250 ml     General appearance: Pleasant elderly  female seen lying down in no acute cardiopulmonary distress. Alert and cooperative with exam  HEENT: atraumatic, eyes with clear conjunctiva and normal lids, pupils and irises normal, external ears and nose are normal, lips normal  Lungs: no increased work of breathing, CTA B, no wheezing, no crackles  Heart: RRR, S1-S2 heard no MRG  Abdomen: Soft, NT, ND, BS +  Extremities: No edema or cyanosis. Peripheral pulses present  Neurologic: no focal neurologic deficits, normal sensation, alert and oriented, affect and mood appropriate  Skin: no rashes, nodules    Assessment and Plan:   Principal Problem:    Atrial fibrillation with RVR (HCC)  Active Problems:    Essential hypertension    Chronic anticoagulation    Acquired hypothyroidism    Hyperlipidemia    Hyperglycemia    Atrial fibrillation ([address]  Resolved Problems:    * No resolved hospital problems.  *    Plan:  #Atrial fibrillation  -Rate controlled  -DC diltiazem drip  -Continue propafenone  -Placement of ILR by cardiology team    #Hypertension  -Continue losartan,Aldactone    #Hypothyroidism  -Continue levothyroxine    Advance Directive: Full Code    DVT prophylaxis: Patient on apixaban    Discharge planning: TBD      Signed:  Erasmo Pierce MD 9/21/2021 2:15 PM  Rounding Hospitalist

## 2021-09-21 NOTE — PROGRESS NOTES
Cardiology Progress Note Juan Keller MD      Patient:  Denton Burgos  [id number]    Patient Active Problem List    Diagnosis Date Noted    Atrial fibrillation with RVR ([address] 03/17/2018     Priority: High    Essential hypertension      Priority: High    Rhinorrhea      Priority: High    Abnormal stress echo      Priority: High    Precordial pain      Priority: High    Atrial fibrillation ([address] 09/20/2021     Priority: Low    Hyperglycemia 09/19/2021     Priority: Low    Chronic anticoagulation 07/29/2020     Priority: Low    PAF (paroxysmal atrial fibrillation) ([address] 05/25/2017     Priority: Low    Hyperlipidemia 03/17/2015     Priority: Low    Acquired hypothyroidism 02/25/2015     Priority: Low       Admit Date:  9/18/2021    Admission Problem List: Present on Admission:   Atrial fibrillation with RVR ([facility]   Essential hypertension   Chronic anticoagulation   Hyperlipidemia   Acquired hypothyroidism   Hyperglycemia   Atrial fibrillation [facility])      Cardiac Specific Data:  Specialty Problems        Cardiology Problems    Precordial pain        * (Principal) Atrial fibrillation with RVR (HCC)        Essential hypertension        Hyperlipidemia        PAF (paroxysmal atrial fibrillation) (HCC)        Atrial fibrillation ([address]            1. Paroxysmal atrial fibrillation previously on Betapace (held for bradycardia) with recurrent atrial fibrillation, on Eliquis, normal LV systolic function (echo 6/42/5508). 2.  Hypertension. 3.  Obstructive sleep apnea with CPAP utilization. Subjective:  Ms. Kaley Cross reverted to atrial fibrillation after her Betapace was stopped. She had been hospitalized at [place] 3 weeks ago at which time they had noticed heart rates around 38 bpm at nighttime. She has obstructive sleep apnea and did not have her CPAP machine during hospitalization. Betapace was held at that time. She had been on Betapace for 3 years with no recurrence of her atrial fibrillation.   Currently placed on IV amiodarone with no bradycardia below 50 bpm noted. Has reverted back to sinus rhythm. Objective:   /77   Pulse 58   Temp 98.1 °F (36.7 °C) (Oral)   Resp 18   Ht 5' 6\" (1.676 m)   Wt 196 lb 6.4 oz (89.1 kg)   SpO2 96%   BMI 31.70 kg/m²       Intake/Output Summary (Last 24 hours) at 9/20/2021 1901  Last data filed at 9/20/2021 1859  Gross per 24 hour   Intake 2305.4 ml   Output --   Net 2305.4 ml       Prior to Admission medications    Medication Sig Start Date End Date Taking?  Authorizing Provider   Zinc Sulfate 220 (50 Zn) MG TABS Take 50 mg by mouth daily    Yes Historical Provider, MD   Cholecalciferol (VITAMIN D3 PO) Take 1,000 mg by mouth daily   Yes Historical Provider, MD   metoprolol succinate (TOPROL XL) 25 MG extended release tablet Take 25 mg by mouth daily   Yes Historical Provider, MD   apixaban (ELIQUIS) 5 MG TABS tablet Take 5 mg by mouth 2 times daily   Yes Historical Provider, MD   calcium carbonate (OSCAL) 500 MG TABS tablet Take 500 mg by mouth daily   Yes Historical Provider, MD   Biotin 1 MG CAPS Take by mouth   Yes Historical Provider, MD   spironolactone (ALDACTONE) 25 MG tablet Take 25 mg by mouth daily   Yes Historical Provider, MD   Omega-3 Fatty Acids (FISH OIL) 1000 MG CAPS Take 2,000 mg by mouth 2 times daily    Yes Historical Provider, MD   levothyroxine (SYNTHROID) 125 MCG tablet Take 125 mcg by mouth Daily   Yes Historical Provider, MD   olmesartan (BENICAR) 20 MG tablet Take 1 tablet by mouth daily  Patient taking differently: Take 10 mg by mouth daily  7/30/20   CHUCK Zavala   cloNIDine (CATAPRES) 0.1 MG tablet Take 1 tablet by mouth 2 times daily as needed for High Blood Pressure (for blood pressure greater than 180/90)  Patient taking differently: Take 0.1 mg by mouth as needed for High Blood Pressure (for blood pressure greater than 180/90)  11/14/19   CHUCK Green        docusate sodium  100 mg Oral Daily    sotalol  40 mg Oral BID  losartan  50 mg Oral Daily    sodium chloride flush  5-40 mL IntraVENous 2 times per day    apixaban  5 mg Oral BID    levothyroxine  125 mcg Oral Daily    spironolactone  25 mg Oral Daily    sodium chloride flush  5-40 mL IntraVENous 2 times per day       TELEMETRY: Sinus     Physical Exam:      Physical Exam  Constitutional:       General: She is not in acute distress. Appearance: She is not diaphoretic. HENT:      Mouth/Throat:      Pharynx: No oropharyngeal exudate. Eyes:      General: No scleral icterus. Right eye: No discharge. Left eye: No discharge. Neck:      Thyroid: No thyromegaly. Vascular: No JVD. Cardiovascular:      Rate and Rhythm: Normal rate and regular rhythm. No extrasystoles are present. Heart sounds: Normal heart sounds, S1 normal and S2 normal. No murmur heard. No systolic murmur is present. No diastolic murmur is present. No friction rub. No gallop. No S3 or S4 sounds. Comments: No JVD  No edema    Pulmonary:      Effort: Pulmonary effort is normal. No respiratory distress. Breath sounds: Normal breath sounds. No wheezing or rales. Chest:      Chest wall: No tenderness. Abdominal:      General: Bowel sounds are normal. There is no distension. Palpations: Abdomen is soft. There is no mass. Tenderness: There is no abdominal tenderness. There is no guarding or rebound. Hernia: No hernia is present. Comments: No palpable organomegaly   Musculoskeletal:         General: Normal range of motion. Skin:     General: Skin is warm. Coloration: Skin is not pale. Findings: No rash. Neurological:      Mental Status: She is alert and oriented to person, place, and time. Cranial Nerves: No cranial nerve deficit.       Deep Tendon Reflexes: Reflexes normal.                 Lab Data:  CBC:   Recent Labs     09/18/21  2145 09/19/21  0204   WBC 5.8 5.0   HGB 13.6 13.3   HCT 43.1 41.7   MCV 99.3* 97.0   PLT 186 178     BMP:   Recent Labs     09/18/21 2145 09/19/21  0203    141   K 3.7 4.0    105   CO2 25 26   BUN 15 13   CREATININE 0.4* 0.4*     LIVER PROFILE:   Recent Labs     09/18/21 2145 09/19/21  0203   AST 29 28   ALT 36* 34*   BILITOT 0.3 0.3   ALKPHOS 140* 121*     PT/INR:   Recent Labs     09/18/21  2222   PROTIME 14.6   INR 1.12     APTT:   Recent Labs     09/18/21  2222   APTT 32.2     BNP:  No results for input(s): BNP in the last 72 hours. CK, CKMB, Troponin: @LABRCNT (CKTOTAL:3, CKMB:3, TROPONINI:3)@    IMAGING:  ECHO Complete 2D W Doppler W Color    Result Date: 9/19/2021  Transthoracic Echocardiography Report (TTE)  Demographics   Patient Name  Seth Samuel Date of Study          09/19/2021   MRN           [id number]         Gender                 Female   Date of Birth 1955     Room Number            MHL-0714   Age           78 year(s)   Height:       66 inches      Referring Physician    Tamra Aquino MD   Weight:       196 pounds     Sonographer            Lucila Zaragoza RDCS   BSA:          1.98 m^2       Interpreting Physician Tamra Aquino MD   BMI:          31.64 kg/m^2  Procedure Type of Study   TTE procedure:ECHO 2D W/DOPPLER/COLOR/CONTRAST. Study Location: Echo Lab Technical Quality: Limited visualization due to poor acoustical window. Patient Status: Inpatient Contrast Medium: Definity. Amount - 4 ml Rhythm: Atrial fibrillation BP: 148/76 mmHg  Conclusions   Summary  Normal size left atrium. Normal left ventricular size with preserved LV function and an estimated  ejection fraction of approximately 55-60%. No evidence of left ventricular mass or thrombus noted. Normal right atrial dimension with no evidence of thrombus or mass noted. Normal right ventricular size with preserved RV function. Structurally normal mitral valve with normal leaflet mobility. No evidence  of mitral valve stenosis or mild mitral regurgitation. Aortic valve appears to be tricuspid. Structurally normal aortic valve. No significant aortic regurgitation or stenosis is noted. Tricuspid valve is structurally normal.  No evidence of tricuspid regurgitation. Signature   ----------------------------------------------------------------  Electronically signed by Jeffery Burnham MD(Interpreting  physician) on 09/19/2021 02:11 PM  ----------------------------------------------------------------   Findings   Mitral Valve  Structurally normal mitral valve with normal leaflet mobility. No evidence  of mitral valve stenosis or mild mitral regurgitation. Aortic Valve  Aortic valve appears to be tricuspid. Structurally normal aortic valve. No significant aortic regurgitation or stenosis is noted. Tricuspid Valve  Tricuspid valve is structurally normal.  No evidence of tricuspid regurgitation. Left Atrium  Normal size left atrium. Left Ventricle  Normal left ventricular size with preserved LV function and an estimated  ejection fraction of approximately 55-60%. No evidence of left ventricular mass or thrombus noted. Right Atrium  Normal right atrial dimension with no evidence of thrombus or mass noted. Right Ventricle  Normal right ventricular size with preserved RV function. Allergies   - No known allergies. M-Mode Measurements (cm)   LVIDd: 3.89 cm                           LVIDs: 2.98 cm  IVSd: 0.86 cm  LVPWd: 0.92 cm                           AO Root Dimension: 2 cm  Rt. Vent. Dimension: 2.19 cm             LA: 3.3 cm  % Ejection Fraction: 55 %                LVOT: 2.1 cm  Doppler Measurements:   AV Peak Velocity:119 cm/s          MV Peak E-Wave: 66 cm/s  AV Peak Gradient: 5.66 mmHg                                     MV Peak Gradient: 1.74 mmHg  TR Velocity:184 cm/s  TR Gradient:13.54 mmHg  Estimated RAP:5 mmHg  RVSP:18 mmHg      XR CHEST PORTABLE    Result Date: 9/19/2021  XR CHEST PORTABLE 9/19/2021 10:50 AM History: Chest pain. Atrial fibrillation with fluttering sensation.  Heart palpitations. Hospitalized with Covid 19 last month. Portable chest x-ray. Comparison is made with March 24, 2018. Heart size is within normal limits The mediastinum has a normal appearance. The lungs are adequately expanded with no pneumonia or pneumothorax. There is mild chronic interstitial disease with scattered calcified granulomas. There is no significant pleural fluid. No congestive failure changes. 1. No acute disease. Signed by Dr Brenna Siu and Plan:    48-year-old female patient with past medical history of paroxysmal atrial fibrillation well-controlled on Betapace and Eliquis, obstructive sleep apnea with CPAP utilization presenting with recurrent atrial fibrillation after recent holding of Betapace due to nighttime asymptomatic bradycardia. 1.  Patient does not report any daytime symptoms of fatigue, lightheadedness or syncope corresponding to bradycardia. Episode occurred at night during hospitalization. She did not have her CPAP at that time. Currently on IV amiodarone with heart rate 58 bpm at rest in sinus rhythm. She had previously been well controlled on Betapace 40 mg twice daily. Have discussed issues with patient in detail. She is symptomatic in atrial fibrillation and does not wish to have rate control strategy as management. Rhythm control strategy would be appropriate. Would restart her Betapace at 40 mg twice daily. Discontinue amiodarone. Have discussed option of implantable loop recorder placement tomorrow with patient. She will decide after discussing with . Alternatively she may consider ablation going forward. If symptomatic bradycardia recorded by ILR, consideration can be made for pacemaker placement to allow treatment for rhythm control.         Viki Chi MD, MD 9/20/2021 7:01 PM

## 2021-09-22 ENCOUNTER — CARE COORDINATION (OUTPATIENT)
Dept: CASE MANAGEMENT | Age: 66
End: 2021-09-22

## 2021-09-22 NOTE — DISCHARGE SUMMARY
Discharge Summary    NAME: Cecile Priest  :    MRN:  [id number]    Admit date:  2021  Discharge date:  2021    Admitting Physician: Tanmay Paredes MD    Advance Directive: Full Code    Consults: cardiology    Primary Care Physician:  Mati Johnson MD    Discharge Diagnoses:  Principal Problem:    Atrial fibrillation with RVR [facility])  Active Problems:    Essential hypertension    Chronic anticoagulation    Acquired hypothyroidism    Hyperlipidemia    Hyperglycemia    Atrial fibrillation ([address]  Resolved Problems:    * No resolved hospital problems. *      Significant Diagnostic Studies:   ECHO Complete 2D W Doppler W Color    Result Date: 2021  Transthoracic Echocardiography Report (TTE)  Demographics   Patient Name  Benito Sicard Date of Study          2021   MRN           [id number]         Gender                 Female   Date of Birth 1955     Room Number            MHL-0714   Age           78 year(s)   Height:       66 inches      Referring Physician    Shama Caballero MD   Weight:       196 pounds     Sonographer            Lucila Zaragoza RDCS   BSA:          1.98 m^2       Interpreting Physician Shama Caballero MD   BMI:          31.64 kg/m^2  Procedure Type of Study   TTE procedure:ECHO 2D W/DOPPLER/COLOR/CONTRAST. Study Location: Echo Lab Technical Quality: Limited visualization due to poor acoustical window. Patient Status: Inpatient Contrast Medium: Definity. Amount - 4 ml Rhythm: Atrial fibrillation BP: 148/76 mmHg  Conclusions   Summary  Normal size left atrium. Normal left ventricular size with preserved LV function and an estimated  ejection fraction of approximately 55-60%. No evidence of left ventricular mass or thrombus noted. Normal right atrial dimension with no evidence of thrombus or mass noted. Normal right ventricular size with preserved RV function. Structurally normal mitral valve with normal leaflet mobility.  No evidence  of mitral valve stenosis or mild mitral regurgitation. Aortic valve appears to be tricuspid. Structurally normal aortic valve. No significant aortic regurgitation or stenosis is noted. Tricuspid valve is structurally normal.  No evidence of tricuspid regurgitation. Signature   ----------------------------------------------------------------  Electronically signed by Arnold Nicole MD(Interpreting  physician) on 09/19/2021 02:11 PM  ----------------------------------------------------------------   Findings   Mitral Valve  Structurally normal mitral valve with normal leaflet mobility. No evidence  of mitral valve stenosis or mild mitral regurgitation. Aortic Valve  Aortic valve appears to be tricuspid. Structurally normal aortic valve. No significant aortic regurgitation or stenosis is noted. Tricuspid Valve  Tricuspid valve is structurally normal.  No evidence of tricuspid regurgitation. Left Atrium  Normal size left atrium. Left Ventricle  Normal left ventricular size with preserved LV function and an estimated  ejection fraction of approximately 55-60%. No evidence of left ventricular mass or thrombus noted. Right Atrium  Normal right atrial dimension with no evidence of thrombus or mass noted. Right Ventricle  Normal right ventricular size with preserved RV function. Allergies   - No known allergies. M-Mode Measurements (cm)   LVIDd: 3.89 cm                           LVIDs: 2.98 cm  IVSd: 0.86 cm  LVPWd: 0.92 cm                           AO Root Dimension: 2 cm  Rt. Vent.  Dimension: 2.19 cm             LA: 3.3 cm  % Ejection Fraction: 55 %                LVOT: 2.1 cm  Doppler Measurements:   AV Peak Velocity:119 cm/s          MV Peak E-Wave: 66 cm/s  AV Peak Gradient: 5.66 mmHg                                     MV Peak Gradient: 1.74 mmHg  TR Velocity:184 cm/s  TR Gradient:13.54 mmHg  Estimated RAP:5 mmHg  RVSP:18 mmHg      XR CHEST PORTABLE    Result Date: 9/19/2021  XR CHEST PORTABLE 9/19/2021 10:50 AM History: Chest pain. Atrial fibrillation with fluttering sensation. Heart palpitations. Hospitalized with Covid 19 last month. Portable chest x-ray. Comparison is made with March 24, 2018. Heart size is within normal limits The mediastinum has a normal appearance. The lungs are adequately expanded with no pneumonia or pneumothorax. There is mild chronic interstitial disease with scattered calcified granulomas. There is no significant pleural fluid. No congestive failure changes. 1. No acute disease. Signed by Dr Joe Friend      Pertinent Labs:   CBC:   Recent Labs     09/18/21  2145 09/19/21  0204   WBC 5.8 5.0   HGB 13.6 13.3    178     BMP:    Recent Labs     09/18/21  2145 09/19/21  0203    141   K 3.7 4.0    105   CO2 25 26   BUN 15 13   CREATININE 0.4* 0.4*   GLUCOSE 153* 117*     INR:   Recent Labs     09/18/21  2222   INR 1.12     Lipids:   Recent Labs     09/19/21  0203   CHOL 163   HDL 56*     ABGs:No results for input(s): PHART, OWN5JQW, PO2ART, YSD5RVM, BEART, HGBAE, Y4SEDEZR, CARBOXHGBART, 02THERAPY in the last 72 hours. HgBA1c:    Recent Labs     09/19/21  0204   LABA1C 5.7       Procedures: Loop recorder placement  Hospital Course:   Patient is a 68-year-old  female with medical history significant for hypertension, hypothyroidism, atrial fibrillation [on chronic anticoagulation] who presented to the ED with history of palpitations.  On admission, patient was noted to be in A. fib with rates between . Patient was started on IV amiodarone and later transition to Betapace 40 mg twice daily. However patient developed recurrent asymptomatic bradycardic episodes requiring the discontinuation of Betapace. Patient was switched to Rythmol 150 mg 3 times daily by cardiology team.  She also had an implantable loop recorder placed. Procedure was well-tolerated.   She was deemed stable and is being discharged to follow-up with cardiology team as outpatient. Physical Exam:  Vital Signs: /77   Pulse 72   Temp 98.1 °F (36.7 °C) (Oral)   Resp 18   Ht 5' 6\" (1.676 m)   Wt 196 lb 6.4 oz (89.1 kg)   SpO2 98%   BMI 31.70 kg/m²   General appearance: Pleasant elderly  female seen lying down in no acute cardiopulmonary distress. Alert and cooperative with exam  HEENT: atraumatic, eyes with clear conjunctiva and normal lids, pupils and irises normal, external ears and nose are normal, lips normal  Lungs: no increased work of breathing, CTA B, no wheezing, no crackles  Heart: RRR, S1-S2 heard no MRG  Abdomen: Soft, NT, ND, BS +  Extremities: No edema or cyanosis.   Peripheral pulses present  Neurologic: no focal neurologic deficits, normal sensation, alert and oriented, affect and mood appropriate  Skin: no rashes, nodules    Discharge Medications:       Soraya Early   Home Medication Instructions CHRIS:[id number]    Printed on:09/21/21 1921   Medication Information                      apixaban (ELIQUIS) 5 MG TABS tablet  Take 5 mg by mouth 2 times daily             Biotin 1 MG CAPS  Take by mouth             calcium carbonate (OSCAL) 500 MG TABS tablet  Take 500 mg by mouth daily             Cholecalciferol (VITAMIN D3 PO)  Take 1,000 mg by mouth daily             cloNIDine (CATAPRES) 0.1 MG tablet  Take 1 tablet by mouth 2 times daily as needed for High Blood Pressure (for blood pressure greater than 180/90)             levothyroxine (SYNTHROID) 125 MCG tablet  Take 125 mcg by mouth Daily             olmesartan (BENICAR) 20 MG tablet  Take 1 tablet by mouth daily             Omega-3 Fatty Acids (FISH OIL) 1000 MG CAPS  Take 2,000 mg by mouth 2 times daily              propafenone (RYTHMOL) 150 MG tablet  Take 1 tablet by mouth every 8 hours             spironolactone (ALDACTONE) 25 MG tablet  Take 25 mg by mouth daily             Zinc Sulfate 220 (50 Zn) MG TABS  Take 50 mg by mouth daily                  Discharge Instructions: Follow up with Denys Hernández MD in 7 days. Take medications as directed. Resume activity as tolerated. Diet: Diet NPO Exceptions are: Sips of Water with Meds     Disposition: Patient is medically stable and will be discharged home    Time spent on discharge-37 minutes.     Signed:  Svitlana Escalante MD  9/21/2021 7:21 PM

## 2021-09-22 NOTE — CARE COORDINATION
[facility] Transitions Initial Follow Up Call    Call within 2 business days of discharge: Yes    Patient: Joseph Steward Patient : 1955   MRN: [id number]  Reason for Admission: Af RVR  Discharge Date: 21 RARS: Readmission Risk Score: 9      Last Discharge [facility]       Complaint Diagnosis Description Type Department Provider    21 Atrial Fibrillation Atrial fibrillation with RVR ([facility] [address] ED to Hosp-Admission (Discharged) (ADMITTED) L TAMEKA Leo MD; Vineet Zhao. .. Spoke with: [address]: [address] [facility]      Non-face-to-face services provided:  Reviewed encounter information for continuity of care prior to follow up Care Transitions phone call - chart notes, consults, progress notes, test results, med list, appointments, AVS, other information. Care Transitions 24 Hour Call    Care Transitions Interventions       Placed a call to the number listed for patient for an initial follow up call for Care Transitions Coordination following discharge from the hospital. Spoke with patient regarding hospitalization, discharge and status thus far. She reported that she is doing rather well so far. Did say that she has not been awake too long this morning, so it is hard to tell. She denied any abnormal symptoms, such as shortness of breath, dizziness, palpitations, chest pain, etc.  She said that she did  her new medication, Rhythmol. She did ask about the dosage and frequency. I did verify that she is taking it correctly per her med list, 150 mg every 8 hours. She is aware of stopping Toprol. She has all of er other meds and is taking them as ordered. Did do a med review. She is aware of her hospital follow with cardiology and also reports that she had one with her PCP scheduled prior to hospitalization, for 2021. She said her appetite is good. She is eating well so far. She is drinking plenty of fluids.   She is not aware of any other issues. Discussed COVID 19 information, risks, signs, quarantine, infection control, vaccines, etc.  Patient aware and voices understanding. Given the opportunity to ask questions and answered appropriately. Ensured to have CTN contact information to be used as needed. Encouraged to call for new issues, questions, concerns, etc.  No further scheduled calls needed at this time.      Follow Up  Future Appointments   Date Time Provider Emmanuel Oro   10/19/2021  9:15 AM CHUCK Odom [facility]   12/20/2021 10:30 AM CHUCK Odom [facility]   6/23/2022 11:45 AM MD ODALIS Alejandre [facility]       Rudy Choudhury RN

## 2021-09-25 ENCOUNTER — CASE MANAGEMENT (OUTPATIENT)
Dept: CARDIOLOGY | Age: 66
End: 2021-09-25

## 2021-09-25 ENCOUNTER — HOSPITAL ENCOUNTER (EMERGENCY)
Age: 66
Discharge: HOME | End: 2021-09-25
Payer: MEDICARE

## 2021-09-25 VITALS
OXYGEN SATURATION: 96 % | DIASTOLIC BLOOD PRESSURE: 70 MMHG | TEMPERATURE: 98.2 F | RESPIRATION RATE: 20 BRPM | BODY MASS INDEX: 32.99 KG/M2 | SYSTOLIC BLOOD PRESSURE: 144 MMHG | HEART RATE: 67 BPM | HEIGHT: 65 IN | WEIGHT: 198 LBS

## 2021-09-25 DIAGNOSIS — I10 ESSENTIAL HYPERTENSION: Primary | ICD-10-CM

## 2021-09-25 LAB
ALBUMIN SERPL-MCNC: 4.1 G/DL (ref 3.5–5.2)
ALP BLD-CCNC: 112 U/L (ref 35–104)
ALT SERPL-CCNC: 24 U/L (ref 5–33)
ANION GAP SERPL CALCULATED.3IONS-SCNC: 11 MMOL/L (ref 7–19)
ANISOCYTOSIS: ABNORMAL
AST SERPL-CCNC: 19 U/L (ref 5–32)
BASOPHILS ABSOLUTE: 0 K/UL (ref 0–0.2)
BASOPHILS RELATIVE PERCENT: 0.5 % (ref 0–1)
BILIRUB SERPL-MCNC: 0.4 MG/DL (ref 0.2–1.2)
BUN BLDV-MCNC: 16 MG/DL (ref 8–23)
CALCIUM SERPL-MCNC: 9.7 MG/DL (ref 8.8–10.2)
CHLORIDE BLD-SCNC: 105 MMOL/L (ref 98–111)
CO2: 24 MMOL/L (ref 22–29)
CREAT SERPL-MCNC: 0.5 MG/DL (ref 0.5–0.9)
EOSINOPHILS ABSOLUTE: 0.1 K/UL (ref 0–0.6)
EOSINOPHILS RELATIVE PERCENT: 1.3 % (ref 0–5)
GFR AFRICAN AMERICAN: >59
GFR NON-AFRICAN AMERICAN: >60
GLUCOSE BLD-MCNC: 117 MG/DL (ref 74–109)
HCT VFR BLD CALC: 40.1 % (ref 37–47)
HEMOGLOBIN: 12.9 G/DL (ref 12–16)
HYPOCHROMIA: ABNORMAL
IMMATURE GRANULOCYTES #: 0.1 K/UL
LYMPHOCYTES ABSOLUTE: 1.8 K/UL (ref 1.1–4.5)
LYMPHOCYTES RELATIVE PERCENT: 28.8 % (ref 20–40)
MCH RBC QN AUTO: 31.3 PG (ref 27–31)
MCHC RBC AUTO-ENTMCNC: 32.2 G/DL (ref 33–37)
MCV RBC AUTO: 97.3 FL (ref 81–99)
MONOCYTES ABSOLUTE: 0.9 K/UL (ref 0–0.9)
MONOCYTES RELATIVE PERCENT: 14.1 % (ref 0–10)
NEUTROPHILS ABSOLUTE: 3.4 K/UL (ref 1.5–7.5)
NEUTROPHILS RELATIVE PERCENT: 54.5 % (ref 50–65)
PDW BLD-RTO: 13.4 % (ref 11.5–14.5)
PLATELET # BLD: 267 K/UL (ref 130–400)
PMV BLD AUTO: 9.7 FL (ref 9.4–12.3)
POLYCHROMASIA: ABNORMAL
POTASSIUM REFLEX MAGNESIUM: 4.2 MMOL/L (ref 3.5–5)
RBC # BLD: 4.12 M/UL (ref 4.2–5.4)
SODIUM BLD-SCNC: 140 MMOL/L (ref 136–145)
TOTAL PROTEIN: 6.8 G/DL (ref 6.6–8.7)
WBC # BLD: 6.2 K/UL (ref 4.8–10.8)

## 2021-09-25 PROCEDURE — 36415 COLL VENOUS BLD VENIPUNCTURE: CPT

## 2021-09-25 PROCEDURE — 99284 EMERGENCY DEPT VISIT MOD MDM: CPT

## 2021-09-25 PROCEDURE — 85025 COMPLETE CBC W/AUTO DIFF WBC: CPT

## 2021-09-25 PROCEDURE — 80053 COMPREHEN METABOLIC PANEL: CPT

## 2021-09-25 PROCEDURE — 93005 ELECTROCARDIOGRAM TRACING: CPT | Performed by: NURSE PRACTITIONER

## 2021-09-25 RX ORDER — CLONIDINE HYDROCHLORIDE 0.1 MG/1
0.1 TABLET ORAL 2 TIMES DAILY PRN
Qty: 30 TABLET | Refills: 3 | Status: SHIPPED | OUTPATIENT
Start: 2021-09-25 | End: 2022-11-01 | Stop reason: SDUPTHER

## 2021-09-25 RX ORDER — LORAZEPAM 2 MG/ML
1 INJECTION INTRAMUSCULAR ONCE
Status: DISCONTINUED | OUTPATIENT
Start: 2021-09-25 | End: 2021-09-25

## 2021-09-26 NOTE — ED PROVIDER NOTES
[facility]  eMERGENCY dEPARTMENT eNCOUnter      Pt Name: Allen Valencia  MRN: [id number]  [place] 1955  Date of evaluation: 9/25/2021  Provider: CHUCK Green    CHIEF COMPLAINT       Chief Complaint   Patient presents with    Hypertension     pt states bp was 180/115 last night. today, (1 hr ago) it was similar in range. Pt restarted her bp meds during recent hospital stay, had been off them for 6months. HISTORY OF PRESENT ILLNESS   (Location/Symptom, Timing/Onset,Context/Setting, Quality, Duration, Modifying Factors, Severity)  Note limiting factors. Allen Valencia is a 78 y.o. female who presents to the emergency department with elevated blood pressure. Patient states she just routinely checks her blood pressure. She denies any chest pain or headache. She was recently released from the hospital and had some medication changes. HPI    NursingNotes were reviewed. REVIEW OF SYSTEMS    (2-9 systems for level 4, 10 or more for level 5)     Review of Systems    Except as noted above the remainder of the review of systems was reviewed and negative.        PAST MEDICAL HISTORY     Past Medical History:   Diagnosis Date    Atrial fibrillation ([address]     Disease of blood and blood forming organ     DVT in right leg     Hyperlipidemia 3/17/2015    Hypertension     Hyperthyroidism     Sleep apnea     Umbilical hernia          SURGICALHISTORY       Past Surgical History:   Procedure Laterality Date    ABDOMEN SURGERY      CARDIAC CATHETERIZATION      COLONOSCOPY      CYSTOSCOPY      HYSTERECTOMY           CURRENT MEDICATIONS       Discharge Medication List as of 9/25/2021  7:25 PM      CONTINUE these medications which have NOT CHANGED    Details   propafenone (RYTHMOL) 150 MG tablet Take 1 tablet by mouth every 8 hours, Disp-90 tablet, R-3Normal      Zinc Sulfate 220 (50 Zn) MG TABS Take 50 mg by mouth daily Historical Med      Cholecalciferol (VITAMIN D3 PO) Take 1,000 mg by mouth dailyHistorical Med      apixaban (ELIQUIS) 5 MG TABS tablet Take 5 mg by mouth 2 times dailyHistorical Med      olmesartan (BENICAR) 20 MG tablet Take 1 tablet by mouth daily, Disp-90 tablet, R-1Normal      calcium carbonate (OSCAL) 500 MG TABS tablet Take 500 mg by mouth dailyHistorical Med      Biotin 1 MG CAPS Take by mouthHistorical Med      spironolactone (ALDACTONE) 25 MG tablet Take 25 mg by mouth dailyHistorical Med      Omega-3 Fatty Acids (FISH OIL) 1000 MG CAPS Take 1,000 mg by mouth 2 times daily Historical Med      levothyroxine (SYNTHROID) 125 MCG tablet Take 125 mcg by mouth DailyHistorical Med             ALLERGIES     Patient has no known allergies. FAMILY HISTORY       Family History   Problem Relation Age of Onset    Heart Attack Mother     High Blood Pressure Father     Pacemaker Father     Diabetes Sister     High Blood Pressure Sister           SOCIAL HISTORY       Social History     Socioeconomic History    Marital status:      Spouse name: Not on file    Number of children: Not on file    Years of education: Not on file    Highest education level: Not on file   Occupational History    Not on file   Tobacco Use    Smoking status: Never Smoker    Smokeless tobacco: Never Used   Vaping Use    Vaping Use: Never used   Substance and Sexual Activity    Alcohol use: No    Drug use: No    Sexual activity: Not on file   Other Topics Concern    Not on file   Social History Narrative    Not on file     Social Determinants of Health     Financial Resource Strain:     Difficulty of Paying Living Expenses:    Food Insecurity:     Worried About Running Out of Food in the Last Year:     Ran Out of Food in the Last Year:    Transportation Needs:     Lack of Transportation (Medical):      Lack of Transportation (Non-Medical):    Physical Activity:     Days of Exercise per Week:     Minutes of Exercise per Session:    Stress:     Feeling of Stress :    Social Plain radiographic images are visualized and preliminarily interpreted by the emergency physician with the below findings:      Interpretation per the Radiologist below, if available at the time of this note:    No orders to display         ED BEDSIDEULTRASOUND:   Performed by ED Physician -none    LABS:  Labs Reviewed   CBC WITH AUTO DIFFERENTIAL - Abnormal; Notable for the following components:       Result Value    RBC 4.12 (*)     MCH 31.3 (*)     MCHC 32.2 (*)     Monocytes % 14.1 (*)     Anisocytosis 1+ (*)     Polychromasia 1+ (*)     Hypochromia 2+ (*)     All other components within normal limits   COMPREHENSIVE METABOLIC PANEL W/ REFLEX TO MG FOR LOW K - Abnormal; Notable for the following components:    Glucose 117 (*)     Alkaline Phosphatase 112 (*)     All other components within normal limits       All other labs were within normal range or not returned as of this dictation. EMERGENCY DEPARTMENT COURSE and DIFFERENTIALDIAGNOSIS/MDM:   Vitals:    Vitals:    09/25/21 1755 09/25/21 1802 09/25/21 1830 09/25/21 1932   BP:  131/71 138/75 (!) 144/70   Pulse: 76 78 78 67   Resp: 16 18 19 20   Temp:       TempSrc:       SpO2: 96% 92% 93% 96%   Weight:       Height:               MDM patient instructed on as needed use of clonidine. She is to follow-up with cardiology if her blood pressure continues to run elevated and is not controlled without the clonidine. CONSULTS:  None    PROCEDURES:  Unless otherwise noted below, none     Procedures    FINAL IMPRESSION      1.  Essential hypertension        DISPOSITION/PLAN   DISPOSITION Decision To Discharge 09/25/2021 07:21:39 PM      PATIENT REFERRED TO:  Deneen Owusu MD  [facility] [address] [facility] [place]  [phone]            DISCHARGE MEDICATIONS:  Discharge Medication List as of 9/25/2021  7:25 PM             (Please note that portions of this note were completed with a voice recognitionprogram.  Efforts were made to edit the dictations but occasionally words are mis-transcribed.)    CHUCK Lema (electronically signed)          CHUCK Lema  09/27/21 0510

## 2021-09-27 LAB
EKG P AXIS: 47 DEGREES
EKG P-R INTERVAL: 168 MS
EKG Q-T INTERVAL: 386 MS
EKG QRS DURATION: 122 MS
EKG QTC CALCULATION (BAZETT): 417 MS
EKG T AXIS: 85 DEGREES

## 2021-09-27 PROCEDURE — 93010 ELECTROCARDIOGRAM REPORT: CPT | Performed by: INTERNAL MEDICINE

## 2021-09-29 NOTE — PROGRESS NOTES
Physician Progress Note      Ubaldo Cronin  CSN #:                  [id number]  :                       1955  ADMIT DATE:       2021 9:25 PM  [address] DATE:        2021 6:54 PM  RESPONDING  PROVIDER #:        Roly Wells TEXT:    Patient admitted with chest fluttering, noted to have atrial fibrillation with   RVR and is maintained on Eliquis. If possible, please document in progress   notes and?discharge?summary if you are evaluating and/or treating any of the   following: The medical record reflects the following:?  ? Risk Factors: afib, Eliquis  ? Clinical Indicators: patient presented with chest fluttering,  with   afib per ECG  ? Treatment:  Amiodarone IV, Betapace, Eliquis    Thank you,  Sven London RN, CCDS  [phone]  Options provided:  -- Secondary hypercoagulable state in a patient with atrial fibrillation  -- Afib on chronic anticoagulation only  -- Other - I will add my own diagnosis  -- Disagree - Not applicable / Not valid  -- Disagree - Clinically unable to determine / Unknown  -- Refer to Clinical Documentation Reviewer    PROVIDER RESPONSE TEXT:    This patient has afib on chronic anticoagulation only.     Query created by: Alma Beverly on 2021 7:47 AM      Electronically signed by:  Char Sethi 2021 7:39 PM

## 2021-10-18 RX ORDER — OLMESARTAN MEDOXOMIL 20 MG/1
10 TABLET ORAL DAILY
Qty: 90 TABLET | Refills: 3 | Status: SHIPPED | OUTPATIENT
Start: 2021-10-18 | End: 2021-10-19

## 2021-10-19 ENCOUNTER — OFFICE VISIT (OUTPATIENT)
Dept: CARDIOLOGY CLINIC | Age: 66
End: 2021-10-19
Payer: MEDICARE

## 2021-10-19 VITALS
OXYGEN SATURATION: 99 % | BODY MASS INDEX: 34.49 KG/M2 | WEIGHT: 207 LBS | DIASTOLIC BLOOD PRESSURE: 70 MMHG | SYSTOLIC BLOOD PRESSURE: 140 MMHG | HEIGHT: 65 IN | HEART RATE: 72 BPM

## 2021-10-19 DIAGNOSIS — I48.0 PAF (PAROXYSMAL ATRIAL FIBRILLATION) (HCC): Primary | ICD-10-CM

## 2021-10-19 DIAGNOSIS — I10 ESSENTIAL HYPERTENSION: ICD-10-CM

## 2021-10-19 DIAGNOSIS — E78.2 MIXED HYPERLIPIDEMIA: ICD-10-CM

## 2021-10-19 DIAGNOSIS — Z79.01 CHRONIC ANTICOAGULATION: ICD-10-CM

## 2021-10-19 DIAGNOSIS — Z95.818 STATUS POST PLACEMENT OF IMPLANTABLE LOOP RECORDER: ICD-10-CM

## 2021-10-19 PROCEDURE — 1036F TOBACCO NON-USER: CPT | Performed by: NURSE PRACTITIONER

## 2021-10-19 PROCEDURE — 4040F PNEUMOC VAC/ADMIN/RCVD: CPT | Performed by: NURSE PRACTITIONER

## 2021-10-19 PROCEDURE — 1111F DSCHRG MED/CURRENT MED MERGE: CPT | Performed by: NURSE PRACTITIONER

## 2021-10-19 PROCEDURE — G8400 PT W/DXA NO RESULTS DOC: HCPCS | Performed by: NURSE PRACTITIONER

## 2021-10-19 PROCEDURE — 99214 OFFICE O/P EST MOD 30 MIN: CPT | Performed by: NURSE PRACTITIONER

## 2021-10-19 PROCEDURE — 93291 INTERROG DEV EVAL SCRMS IP: CPT | Performed by: NURSE PRACTITIONER

## 2021-10-19 PROCEDURE — G8427 DOCREV CUR MEDS BY ELIG CLIN: HCPCS | Performed by: NURSE PRACTITIONER

## 2021-10-19 PROCEDURE — G8417 CALC BMI ABV UP PARAM F/U: HCPCS | Performed by: NURSE PRACTITIONER

## 2021-10-19 PROCEDURE — 1123F ACP DISCUSS/DSCN MKR DOCD: CPT | Performed by: NURSE PRACTITIONER

## 2021-10-19 PROCEDURE — 1090F PRES/ABSN URINE INCON ASSESS: CPT | Performed by: NURSE PRACTITIONER

## 2021-10-19 PROCEDURE — 3017F COLORECTAL CA SCREEN DOC REV: CPT | Performed by: NURSE PRACTITIONER

## 2021-10-19 PROCEDURE — G8484 FLU IMMUNIZE NO ADMIN: HCPCS | Performed by: NURSE PRACTITIONER

## 2021-10-19 RX ORDER — MULTIVIT WITH MINERALS/LUTEIN
250 TABLET ORAL 3 TIMES DAILY
COMMUNITY

## 2021-10-19 RX ORDER — OLMESARTAN MEDOXOMIL 20 MG/1
10 TABLET ORAL 2 TIMES DAILY
Qty: 90 TABLET | Refills: 3 | Status: SHIPPED | OUTPATIENT
Start: 2021-10-19

## 2021-10-19 RX ORDER — OLMESARTAN MEDOXOMIL 20 MG/1
20 TABLET ORAL DAILY
COMMUNITY
End: 2021-10-19 | Stop reason: SDUPTHER

## 2021-10-19 NOTE — PROGRESS NOTES
[facility], [place]. [address], [address], [address] [facility]  [phone] office  [phone] fax      OFFICE VISIT:  10/19/2021    Emerita Escalante - : 1955  Reason For Visit:  Joann Araya is a 78 y.o. female who is here for 1 Month Follow-Up (Went to ED on 2021, 2021 for AFib and hypertension and loop recorder check. ) and Follow-Up from Hospital    History:   Diagnosis Orders   1. PAF (paroxysmal atrial fibrillation) ([address]     2. Essential hypertension     3. Mixed hyperlipidemia     4. Chronic anticoagulation      Eliquis   5. Status post placement of implantable loop recorder        The patient presents today for cardiology hospital follow up. The patient reports being hospitalized in August for COVID 19. The patient was then admitted to [facility]) for palpitations on 21 and found to be in AF with rates between  bpm.  She was started on IV amiodarone and later transitioned to Sotalol 40 mg BID. However, the patient developed symptomatic bradycardia. Sotalol was discontinued and Rythmol 150 mg TID started. Subsequently, the patient had a loop recorder implanted. The patient presented to the ED again on 21 for high blood pressure. Today, the patient reports doing better. Her BP is much better as well. On occasion, she takes and extra Benicar 20 mg 1/2 tab at bedtime. However, the extra Benicar sometimes results in low AM BP readings. Home BP log reviewed today. No bleeding issue reported on Eliquis. he patient denies symptoms to suggest myocardial ischemia, heart failure or arrhythmia. The patient's PCP monitors cholesterol. Subjective  Emerita denies exertional chest pain, shortness of breath, orthopnea, paroxysmal nocturnal dyspnea, syncope, presyncope, sensed arrhythmia, edema and fatigue.   The patient denies numbness or weakness to suggest cerebrovascular accident or transient ischemic attack.       Samy Thomas has the following history as recorded in [facility]:  Patient Active Problem List   Diagnosis Code    PAF (paroxysmal atrial fibrillation) ([facility]) I48.0    Abnormal stress echo R94.39    Precordial pain R07.2    Atrial fibrillation with RVR ([facility]) I48.91    Essential hypertension I10    Rhinorrhea J34.89    Chronic anticoagulation Z79.01    Acquired hypothyroidism E03.9    Hyperlipidemia E78.5    Hyperglycemia R73.9    Atrial fibrillation ([facility]) I48.91     Past Medical History:   Diagnosis Date    Atrial fibrillation ([facility])     Disease of blood and blood forming organ     DVT in right leg     Hyperlipidemia 3/17/2015    Hypertension     Hyperthyroidism     Sleep apnea     Umbilical hernia      Past Surgical History:   Procedure Laterality Date    ABDOMEN SURGERY      CARDIAC CATHETERIZATION      COLONOSCOPY      CYSTOSCOPY      HYSTERECTOMY       Family History   Problem Relation Age of Onset    Heart Attack Mother     High Blood Pressure Father     Pacemaker Father     Diabetes Sister     High Blood Pressure Sister      Social History     Tobacco Use    Smoking status: Never Smoker    Smokeless tobacco: Never Used   Substance Use Topics    Alcohol use: No      Current Outpatient Medications   Medication Sig Dispense Refill    cloNIDine (CATAPRES) 0.1 MG tablet Take 1 tablet by mouth 2 times daily as needed for High Blood Pressure (for blood pressure > 180/100) 30 tablet 3    propafenone (RYTHMOL) 150 MG tablet Take 1 tablet by mouth every 8 hours 90 tablet 3    Zinc Sulfate 220 (50 Zn) MG TABS Take 50 mg by mouth daily       Cholecalciferol (VITAMIN D3 PO) Take 1,000 mg by mouth daily      apixaban (ELIQUIS) 5 MG TABS tablet Take 5 mg by mouth 2 times daily      calcium carbonate (OSCAL) 500 MG TABS tablet Take 500 mg by mouth daily      Biotin 1 MG CAPS Take by mouth      spironolactone (ALDACTONE) 25 MG tablet Take 25 mg by mouth daily      Omega-3 Fatty Acids (FISH OIL) 1000 MG CAPS Take 1,000 mg by mouth 2 times daily       levothyroxine (SYNTHROID) 125 MCG tablet Take 125 mcg by mouth Daily       No current facility-administered medications for this visit. Allergies: Patient has no known allergies. Review of Systems  Constitutional  no appetite change, or unexpected weight change. No fever, chills or diaphoresis. No significant change in activity level or new onset of fatigue. HEENT  no significant rhinorrhea or epistaxis. No tinnitus or significant hearing loss. Eyes  no sudden vision change or amaurosis. No corneal arcus, xantholasma, subconjunctival hemorrhage or discharge. Respiratory  no significant wheezing, stridor, apnea or cough. No dyspnea on exertion or shortness of air. Cardiovascular  no exertional chest pain to suggest myocardial ischemia. No orthopnea or PND. No sensation of sustained arrythmia. No occurrence of slow heart rate. No palpitations. No claudication. Gastrointestinal  no abdominal swelling or pain. No blood in stool. No severe constipation, diarrhea, nausea, or vomiting. Genitourinary  no dysuria, frequency, or urgency. No flank pain or hematuria. Musculoskeletal  no back pain or myalgia. No problems with gait. Extremities - no clubbing, cyanosis or extremity edema. Skin  no color change or rash. No pallor. No new surgical incision. Neurologic  no speech difficulty, facial asymmetry or lateralizing weakness. No seizures, presyncope or syncope. No significant dizziness. Hematologic  no easy bruising or excessive bleeding. Psychiatric  no severe anxiety or insomnia. No confusion. All other review of systems are negative. Objective  Vital Signs - Ht 5' 5\" (1.651 m)   Wt 207 lb (93.9 kg)   BMI 34.45 kg/m²   General - Emerita is alert, cooperative, and pleasant. Well groomed. No acute distress. Body habitus - Body mass index is 34.45 kg/m².   HEENT  Head is normocephalic. No circumoral cyanosis. Dentition is normal.  EYES -   Lids normal without ptosis. No discharge, edema or subconjunctival hemorrhage. Neck - Symmetrical without apparent mass or lymphadenopathy. Respiratory - Normal respiratory effort without use of accessory muscles. Ausculatation reveals vesicular breath sounds without crackles, wheezes, rub or rhonchi. Cardiovascular  No jugular venous distention. Auscultation reveals regular rate and rhythm. No audible clicks, gallop or rub. No murmur. No lower extremity varicosities. No carotid bruits. Loop recorder implanted per left chest. Incision well healed. No erythema, edema, drainage or erosion. Abdominal -  No visible distention, mass or pulsations. Extremities - No clubbing or cyanosis. No statis dermatitis or ulcers. No edema. Musculoskeletal -   No Osler's nodes. No kyphosis or scoliosis. Gait is even and regular without limp or shuffle. Ambulates without assistance. Skin -  Warm and dry; no rash or pallor. No new surgical wound. Neurological - No focal neurological deficits. Thought processes coherent. No apparent tremor. Oriented to person, place and time. Psychiatric -  Appropriate affect and mood. Data reviewed:  9/19/21 echo   Normal size left atrium. Normal left ventricular size with preserved LV function and an estimated  ejection fraction of approximately 55-60%. No evidence of left ventricular mass or thrombus noted. Normal right atrial dimension with no evidence of thrombus or mass noted. Normal right ventricular size with preserved RV function. Structurally normal mitral valve with normal leaflet mobility. No evidence  of mitral valve stenosis or mild mitral regurgitation. Aortic valve appears to be tricuspid. Structurally normal aortic valve. No significant aortic regurgitation or stenosis is noted. Tricuspid valve is structurally normal.   No evidence of tricuspid regurgitation. Signature    Electronically signed by Dylan Day MD(Interpreting   physician) on 09/19/2021 02:11 PM    Lab Results   Component Value Date    WBC 6.2 09/25/2021    HGB 12.9 09/25/2021    HCT 40.1 09/25/2021    MCV 97.3 09/25/2021     09/25/2021     Lab Results   Component Value Date     09/25/2021    K 4.2 09/25/2021     09/25/2021    CO2 24 09/25/2021    BUN 16 09/25/2021    CREATININE 0.5 09/25/2021    GLUCOSE 117 (H) 09/25/2021    CALCIUM 9.7 09/25/2021    PROT 6.8 09/25/2021    LABALBU 4.1 09/25/2021    BILITOT 0.4 09/25/2021    ALKPHOS 112 (H) 09/25/2021    AST 19 09/25/2021    ALT 24 09/25/2021    LABGLOM >60 09/25/2021    GFRAA >59 09/25/2021       Lab Results   Component Value Date    TSH 6.890 (H) 09/18/2021     Lab Results   Component Value Date    CHOL 163 09/19/2021     Lab Results   Component Value Date    TRIG 181 (H) 09/19/2021     Lab Results   Component Value Date    HDL 56 (L) 09/19/2021     Lab Results   Component Value Date    LDLCALC 71 09/19/2021       EKG today reviewed: NSR 64 bpm; QTC .409; no acute ischemic changes or ectopy. BP Readings from Last 3 Encounters:   09/25/21 (!) 144/70   09/21/21 134/77   06/24/21 130/72    Pulse Readings from Last 3 Encounters:   09/25/21 67   09/21/21 72   06/24/21 58        Wt Readings from Last 3 Encounters:   10/19/21 207 lb (93.9 kg)   09/25/21 198 lb (89.8 kg)   09/19/21 196 lb 6.4 oz (89.1 kg)     Assessment/Plan:   Diagnosis Orders   1. PAF (paroxysmal atrial fibrillation) (HCC)  EKG 12 lead   2. Essential hypertension     3. Mixed hyperlipidemia     4. Chronic anticoagulation      Eliquis   5. Status post placement of implantable loop recorder     [id number] Score: 3  Disclaimer: Risk Score calculation is dependent on accuracy of patient problem list and past encounter diagnosis. Loop recorder interrogation:  Current rhythm - NSR 69 bpm  No symptom, tachy, pause or AT/AF. Home monitor questions answered.     Stable

## 2021-10-19 NOTE — PATIENT INSTRUCTIONS
Please call [employer] Get connected at [phone] to order a Symptom Recorder that can go on a necklace/keychain. New instructions for today:  Try taking Benicar (olmesartan) 20 mg 1/2 tab twice daily. Monitor your blood pressure twice daily and keep a log. Your blood pressure goal is 130/80 or less. We will discuss in 2 weeks by either scheduled telephone encounter or patient call back. Office phone number [phone]. Eliquis can increase your risk of bleeding. If you notice blood in urine or stool, bleeding gums, excessive bruising or cough productive of bloody sputum, notify the office. Information on this blood thinner has been included in your after visit summary. Patient Instructions:  Continue current medications as prescribed. Always keep a current medication list. Bring your medications to every office visit. Continue to follow up with primary care provider for non cardiac medical problems. Call the office with any problems, questions or concerns at [phone]. If you have been asked to keep a blood pressure log, do so for 2 weeks. Call the office to report readings to the triage nurse at [phone]. Follow up with cardiologist as scheduled. The following educational material has been included in this after visit summary for your review: Life simple 7. Heart health. Life simple 7  1) Manage blood pressure - high blood pressure is a major risk factor for heart disease and stroke. Keeping blood pressure in health range reduces strain on your heart, arteries and kidneys. Blood pressure goal is less than 130/80. 2) Control cholesterol - contributes to plaque, which can clog arteries and lead to heart disease and stroke. When you control your cholesterol you are giving your arteries their best chance to remain clear. It is recommended that you get cholesterol lab work done once a year.   3) Reduce blood sugar - most of the food we eat is turning into glucose or blood sugar that our body uses for energy. Over time, high levels of blood sugar can damage your heart, kidneys, eyes and nerves. 4) Get active - living an active life is one of the most rewarding gifts you can give yourself and those you love. Simply put, daily physical activity increases your length and quality of life. Strive to exercise 15 minutes most days of the week. 5)  Eat better - A healthy diet is one of your best weapons for fighting cardiovascular disease. When you eat a heart healthy diet, you improve your chances for feeling good and staying healthy for life. 6)  Lose weight - when you shed extra fat an unnecessary pounds, you reduce the burden on your hear, lungs, blood vessels and skeleton. You give yourself the gift of active living, you lower your blood pressure and help yourself feel better. 7) Stop smoking - cigarette smokers have a higher risk of developing cardiovascular disease. If  You smoke, quitting is the best thing you can do for your health. Check American Heart Association on line for more information on Life's Simple 7 and tips for healthy living. A Healthy Heart: Care Instructions  Your Care Instructions     Coronary artery disease, also called heart disease, occurs when a substance called plaque builds up in the vessels that supply oxygen-rich blood to your heart muscle. This can narrow the blood vessels and reduce blood flow. A heart attack happens when blood flow is completely blocked. A high-fat diet, smoking, and other factors increase the risk of heart disease. Your doctor has found that you have a chance of having heart disease. You can do lots of things to keep your heart healthy. It may not be easy, but you can change your diet, exercise more, and quit smoking. These steps really work to lower your chance of heart disease. Follow-up care is a key part of your treatment and safety.  Be sure to make and go to all appointments, and call your doctor if you are having problems. It's also a good idea to know your test results and keep a list of the medicines you take. How can you care for yourself at home? Diet  · Use less salt when you cook and eat. This helps lower your blood pressure. Taste food before salting. Add only a little salt when you think you need it. With time, your taste buds will adjust to less salt. · Eat fewer snack items, fast foods, canned soups, and other high-salt, high-fat, processed foods. · Read food labels and try to avoid saturated and trans fats. They increase your risk of heart disease by raising cholesterol levels. · Limit the amount of solid fat-butter, margarine, and shortening-you eat. Use olive, peanut, or canola oil when you cook. Bake, broil, and steam foods instead of frying them. · Eat a variety of fruit and vegetables every day. Dark green, deep orange, red, or yellow fruits and vegetables are especially good for you. Examples include spinach, carrots, peaches, and berries. · Foods high in fiber can reduce your cholesterol and provide important vitamins and minerals. High-fiber foods include whole-grain cereals and breads, oatmeal, beans, brown rice, citrus fruits, and apples. · Eat lean proteins. Heart-healthy proteins include seafood, lean meats and poultry, eggs, beans, peas, nuts, seeds, and soy products. · Limit drinks and foods with added sugar. These include candy, desserts, and soda pop. Lifestyle changes  · If your doctor recommends it, get more exercise. Walking is a good choice. Bit by bit, increase the amount you walk every day. Try for at least 30 minutes on most days of the week. You also may want to swim, bike, or do other activities. · Do not smoke. If you need help quitting, talk to your doctor about stop-smoking programs and medicines. These can increase your chances of quitting for good. Quitting smoking may be the most important step you can take to protect your heart. It is never too late to quit.   · Limit alcohol to 2 drinks a day for men and 1 drink a day for women. Too much alcohol can cause health problems. · Manage other health problems such as diabetes, high blood pressure, and high cholesterol. If you think you may have a problem with alcohol or drug use, talk to your doctor. Medicines  · Take your medicines exactly as prescribed. Call your doctor if you think you are having a problem with your medicine. · If your doctor recommends aspirin, take the amount directed each day. Make sure you take aspirin and not another kind of pain reliever, such as acetaminophen (Tylenol). When should you call for help? [id number] if you have symptoms of a heart attack. These may include:  · Chest pain or pressure, or a strange feeling in the chest.  · Sweating. · Shortness of breath. · Pain, pressure, or a strange feeling in the back, neck, jaw, or upper belly or in one or both shoulders or arms. · Lightheadedness or sudden weakness. · A fast or irregular heartbeat. After you call 911, the  may tell you to chew 1 adult-strength or 2 to 4 low-dose aspirin. Wait for an ambulance. Do not try to drive yourself. Watch closely for changes in your health, and be sure to contact your doctor if you have any problems. Where can you learn more? Go to https://[employer] org and sign in to your [employer] account. Enter V601 in the [facility] box to learn more about \"A Healthy Heart: Care Instructions. \"     If you do not have an account, please click on the \"Sign Up Now\" link. Current as of: December 16, 2019               Content Version: 12.5  © 0199-1571 [employer]. Care instructions adapted under license by [facility] ([facility]). If you have questions about a medical condition or this instruction, always ask your healthcare professional. [address] any warranty or liability for your use of this information.

## 2021-11-02 ENCOUNTER — VIRTUAL VISIT (OUTPATIENT)
Dept: CARDIOLOGY CLINIC | Age: 66
End: 2021-11-02
Payer: MEDICARE

## 2021-11-02 DIAGNOSIS — E78.2 MIXED HYPERLIPIDEMIA: ICD-10-CM

## 2021-11-02 DIAGNOSIS — Z79.01 CHRONIC ANTICOAGULATION: ICD-10-CM

## 2021-11-02 DIAGNOSIS — I10 ESSENTIAL HYPERTENSION: ICD-10-CM

## 2021-11-02 DIAGNOSIS — I48.0 PAF (PAROXYSMAL ATRIAL FIBRILLATION) (HCC): Primary | ICD-10-CM

## 2021-11-02 PROCEDURE — 99441 PR PHYS/QHP TELEPHONE EVALUATION 5-10 MIN: CPT | Performed by: NURSE PRACTITIONER

## 2021-11-02 NOTE — PROGRESS NOTES
Lulu Jauregui is a 78 y.o. female evaluated via telephone on 11/2/2021. Consent:  She and/or health care decision maker is aware that that she may receive a bill for this telephone service, depending on her insurance coverage, and has provided verbal consent to proceed: Yes    Documentation:  I communicated with the patient and/or health care decision maker about HTN. Details of this discussion including any medical advice provided: heart health. I affirm this is a Patient Initiated Episode with an Established Patient who has not had a related appointment within my department in the past 7 days or scheduled within the next 24 hours. Total Time: minutes: 5-10 minutes    Note: not billable if this call serves to triage the patient into an appointment for the relevant concern    CHUCK Villegas      11/2/2021    Audio Patient Encouter(During [place] [id number] public [facility] emergency)  The telephone encourter was conducted with patient in their residence from [address] with CHUCK Julian; assistance by Evelina Noguera MA.    HPI:  Emerita Orourke   Diagnosis Orders   1. PAF (paroxysmal atrial fibrillation) ([facility] [address]     2. Chronic anticoagulation      Eliquis   3. Mixed hyperlipidemia     4. Essential hypertension       The patient presents today for telephone visit to discuss BP after change in Benicar dosing schedule. The patient reports that 1/2 tab Benicar 20 mg twice daily did not work for her. She is now taking Benicar 20 mg (1) AM and 1/2 PM as needed. Her BP for the most part runs around 125/72. No report of sensed arrhythmia. The patient reports no bleeding issue on  [address] [place] [address]. The patient denies symptoms to suggest myocardial ischemia, heart failure or arrhythmia. BP is well controlled on current regimen. The patient's PCP monitors cholesterol.       SUBJECTIVE:  Emerita denies exertional chest pain, shortness of breath, orthopnea, paroxysmal nocturnal dyspnea, syncope, presyncope, sensed arrhythmia, edema and fatigue. The patient denies numbness or weakness to suggest cerebrovascular accident or transient ischemic attack. Review of Systems    Prior to Visit Medications    Medication Sig Taking? Authorizing Provider   Ascorbic Acid (VITAMIN C) 250 MG tablet Take 250 mg by mouth daily Yes Historical Provider, MD   apixaban (ELIQUIS) 5 MG TABS tablet Take 1 tablet by mouth 2 times daily Yes CHUCK Head   cloNIDine (CATAPRES) 0.1 MG tablet Take 1 tablet by mouth 2 times daily as needed for High Blood Pressure (for blood pressure > 180/100) Yes CHUCK Ramirez   propafenone (RYTHMOL) 150 MG tablet Take 1 tablet by mouth every 8 hours Yes Lynne Alegria MD   Zinc Sulfate 220 (50 Zn) MG TABS Take 50 mg by mouth daily  Yes Historical Provider, MD   Cholecalciferol (VITAMIN D3 PO) Take 1,000 mg by mouth daily Yes Historical Provider, MD   calcium carbonate (OSCAL) 500 MG TABS tablet Take 500 mg by mouth daily Yes Historical Provider, MD   Biotin 1 MG CAPS Take by mouth Yes Historical Provider, MD   spironolactone (ALDACTONE) 25 MG tablet Take 25 mg by mouth daily Yes Historical Provider, MD   Omega-3 Fatty Acids (FISH OIL) 1000 MG CAPS Take 1,000 mg by mouth 2 times daily  Yes Historical Provider, MD   levothyroxine (SYNTHROID) 125 MCG tablet Take 125 mcg by mouth Daily Yes Historical Provider, MD   olmesartan (BENICAR) 20 MG tablet Take 0.5 tablets by mouth 2 times daily  Patient taking differently: Take 20 mg by mouth daily If needed 10 mg taken at pm.  CHUCK Head       Social History     Tobacco Use    Smoking status: Never Smoker    Smokeless tobacco: Never Used   Vaping Use    Vaping Use: Never used   Substance Use Topics    Alcohol use: No    Drug use: No        REVIEW OF SYSTEMS:  Constitutional  no appetite change, or unexpected weight change. No fever, chills or diaphoresis. No significant change in activity level or new onset of fatigue.    HEENT  no significant rhinorrhea or epistaxis. No tinnitus or significant hearing loss. Eyes  no sudden vision change or amaurosis. No corneal arcus, xantholasma, subconjunctival hemorrhage or discharge. Respiratory  no significant wheezing, stridor, apnea or cough. No dyspnea on exertion or shortness of air. Cardiovascular  no exertional chest pain to suggest myocardial ischemia. No orthopnea or PND. No sensation of sustained arrythmia. No occurrence of slow heart rate. No palpitations. No claudication. Gastrointestinal  no abdominal swelling or pain. No blood in stool. No severe constipation, diarrhea, nausea, or vomiting. Genitourinary  no dysuria, frequency, or urgency. No flank pain or hematuria. Musculoskeletal  no back pain or myalgia. No problems with gait. Extremities - no clubbing, cyanosis or extremity edema. Skin  no color change or rash. No pallor. No new surgical incision. Neurologic  no speech difficulty, facial asymmetry or lateralizing weakness. No seizures, presyncope or syncope. No significant dizziness. Hematologic  no easy bruising or excessive bleeding. Psychiatric  no severe anxiety or insomnia. No confusion. All other review of systems are negative. DATA REVIEWED:  9/19/21 echo   Normal size left atrium.   Normal left ventricular size with preserved LV function and an estimated  ejection fraction of approximately 55-60%.   No evidence of left ventricular mass or thrombus noted.   Normal right atrial dimension with no evidence of thrombus or mass noted.   Normal right ventricular size with preserved RV function.   Structurally normal mitral valve with normal leaflet mobility.  No evidence  of mitral valve stenosis or mild mitral regurgitation.   Aortic valve appears to be tricuspid.   Structurally normal aortic valve.   No significant aortic regurgitation or stenosis is noted.   Tricuspid valve is structurally normal.   No evidence of tricuspid regurgitation.    Signature    Electronically signed by Caitlyn Smith MD(Interpreting   physician) on 09/19/2021 02:11 PM           Lab Results   Component Value Date     WBC 6.2 09/25/2021     HGB 12.9 09/25/2021     HCT 40.1 09/25/2021     MCV 97.3 09/25/2021      09/25/2021            Lab Results   Component Value Date      09/25/2021     K 4.2 09/25/2021      09/25/2021     CO2 24 09/25/2021     BUN 16 09/25/2021     CREATININE 0.5 09/25/2021     GLUCOSE 117 (H) 09/25/2021     CALCIUM 9.7 09/25/2021     PROT 6.8 09/25/2021     LABALBU 4.1 09/25/2021     BILITOT 0.4 09/25/2021     ALKPHOS 112 (H) 09/25/2021     AST 19 09/25/2021     ALT 24 09/25/2021     LABGLOM >60 09/25/2021     GFRAA >59 09/25/2021               Lab Results   Component Value Date     TSH 6.890 (H) 09/18/2021            Lab Results   Component Value Date     CHOL 163 09/19/2021            Lab Results   Component Value Date     TRIG 181 (H) 09/19/2021            Lab Results   Component Value Date     HDL 56 (L) 09/19/2021            Lab Results   Component Value Date     LDLCALC 71 09/19/2021      ASSESSMENT/PlAN:   Diagnosis Orders   1. PAF (paroxysmal atrial fibrillation) ([facility] [address]     2. Chronic anticoagulation      Eliquis   3. Mixed hyperlipidemia     4. Essential hypertension       [id number] Score: 3  Disclaimer: Risk Score calculation is dependent on accuracy of patient problem list and past encounter diagnosis. Stable CV status without overt heart failure, sensed arrhythmia or angina.       PAF - no recurrent AF on Rythmol. No bleeding issues on Eliquis. Continue same. HTN -  Current good BP control on regimen. Continue same.     Hyperlipidemia - LDL 71.       Patient is compliant with medication regimen. Continue current medical management for cardiac condition. Continue current medications as prescribed. BP goal is 130/80 or less.   If your primary care provider is outside of the [facility], please request your labs be faxed to this office at [phone]. Continue to follow up with primary care provider for non cardiac medical problems. Call the office with any problems, questions or concerns at [phone]. Follow up with cardiologist as scheduled in [facility] The following educational material has been included in this after visit summary for patient review:  Heart healthEddi Patrick is a 78 y.o. female being evaluated by a telephone encounter to address concerns as mentioned above. A caregiver was present when appropriate. Due to this being a TeleHealth encounter (During [facility] public health emergency). Pursuant to the emergency declaration under the [address] [place] [address], [address] [place] [address] authority and the [employer] Act, this Virtual Visit was conducted with patient's (and/or legal guardian's) consent, to reduce the patient's risk of exposure to COVID-19 and provide necessary medical care. The patient (and/or legal guardian) has also been advised to contact this office for worsening conditions or problems, and seek emergency medical treatment and/or call 911 if deemed necessary. Services were provided through a telephone discussion virtually to substitute for in-person clinic visit. Patient and provider were located at their individual homes. --CHUCK Velazquez on 11/2/2021 at 11:46 AM    An electronic signature was used to authenticate this note.

## 2021-11-02 NOTE — PATIENT INSTRUCTIONS
New instructions for today:  Eliquis can increase your risk of bleeding. If you notice blood in urine or stool, bleeding gums, excessive bruising or cough productive of bloody sputum, notify the office. Information on this blood thinner has been included in your after visit summary. Patient Instructions:  Continue current medications as prescribed. Always keep a current medication list. Bring your medications to every office visit. Continue to follow up with primary care provider for non cardiac medical problems. Call the office with any problems, questions or concerns at [phone]. If you have been asked to keep a blood pressure log, do so for 2 weeks. Call the office to report readings to the triage nurse at [phone]. Follow up with cardiologist as scheduled. The following educational material has been included in this after visit summary for your review: Life simple 7. Heart health. Life simple 7  1) Manage blood pressure - high blood pressure is a major risk factor for heart disease and stroke. Keeping blood pressure in health range reduces strain on your heart, arteries and kidneys. Blood pressure goal is less than 130/80. 2) Control cholesterol - contributes to plaque, which can clog arteries and lead to heart disease and stroke. When you control your cholesterol you are giving your arteries their best chance to remain clear. It is recommended that you get cholesterol lab work done once a year. 3) Reduce blood sugar - most of the food we eat is turning into glucose or blood sugar that our body uses for energy. Over time, high levels of blood sugar can damage your heart, kidneys, eyes and nerves. 4) Get active - living an active life is one of the most rewarding gifts you can give yourself and those you love. Simply put, daily physical activity increases your length and quality of life. Strive to exercise 15 minutes most days of the week.   5)  Eat better - A healthy diet is one of your best weapons for fighting cardiovascular disease. When you eat a heart healthy diet, you improve your chances for feeling good and staying healthy for life. 6)  Lose weight - when you shed extra fat an unnecessary pounds, you reduce the burden on your hear, lungs, blood vessels and skeleton. You give yourself the gift of active living, you lower your blood pressure and help yourself feel better. 7) Stop smoking - cigarette smokers have a higher risk of developing cardiovascular disease. If  You smoke, quitting is the best thing you can do for your health. Check American Heart Association on line for more information on Life's Simple 7 and tips for healthy living. A Healthy Heart: Care Instructions  Your Care Instructions     Coronary artery disease, also called heart disease, occurs when a substance called plaque builds up in the vessels that supply oxygen-rich blood to your heart muscle. This can narrow the blood vessels and reduce blood flow. A heart attack happens when blood flow is completely blocked. A high-fat diet, smoking, and other factors increase the risk of heart disease. Your doctor has found that you have a chance of having heart disease. You can do lots of things to keep your heart healthy. It may not be easy, but you can change your diet, exercise more, and quit smoking. These steps really work to lower your chance of heart disease. Follow-up care is a key part of your treatment and safety. Be sure to make and go to all appointments, and call your doctor if you are having problems. It's also a good idea to know your test results and keep a list of the medicines you take. How can you care for yourself at home? Diet  · Use less salt when you cook and eat. This helps lower your blood pressure. Taste food before salting. Add only a little salt when you think you need it. With time, your taste buds will adjust to less salt.   · Eat fewer snack items, fast foods, canned soups, and other high-salt, high-fat, processed foods. · Read food labels and try to avoid saturated and trans fats. They increase your risk of heart disease by raising cholesterol levels. · Limit the amount of solid fat-butter, margarine, and shortening-you eat. Use olive, peanut, or canola oil when you cook. Bake, broil, and steam foods instead of frying them. · Eat a variety of fruit and vegetables every day. Dark green, deep orange, red, or yellow fruits and vegetables are especially good for you. Examples include spinach, carrots, peaches, and berries. · Foods high in fiber can reduce your cholesterol and provide important vitamins and minerals. High-fiber foods include whole-grain cereals and breads, oatmeal, beans, brown rice, citrus fruits, and apples. · Eat lean proteins. Heart-healthy proteins include seafood, lean meats and poultry, eggs, beans, peas, nuts, seeds, and soy products. · Limit drinks and foods with added sugar. These include candy, desserts, and soda pop. Lifestyle changes  · If your doctor recommends it, get more exercise. Walking is a good choice. Bit by bit, increase the amount you walk every day. Try for at least 30 minutes on most days of the week. You also may want to swim, bike, or do other activities. · Do not smoke. If you need help quitting, talk to your doctor about stop-smoking programs and medicines. These can increase your chances of quitting for good. Quitting smoking may be the most important step you can take to protect your heart. It is never too late to quit. · Limit alcohol to 2 drinks a day for men and 1 drink a day for women. Too much alcohol can cause health problems. · Manage other health problems such as diabetes, high blood pressure, and high cholesterol. If you think you may have a problem with alcohol or drug use, talk to your doctor. Medicines  · Take your medicines exactly as prescribed.  Call your doctor if you think you are having a problem with your medicine. · If your doctor recommends aspirin, take the amount directed each day. Make sure you take aspirin and not another kind of pain reliever, such as acetaminophen (Tylenol). When should you call for help? [id number] if you have symptoms of a heart attack. These may include:  · Chest pain or pressure, or a strange feeling in the chest.  · Sweating. · Shortness of breath. · Pain, pressure, or a strange feeling in the back, neck, jaw, or upper belly or in one or both shoulders or arms. · Lightheadedness or sudden weakness. · A fast or irregular heartbeat. After you call 911, the  may tell you to chew 1 adult-strength or 2 to 4 low-dose aspirin. Wait for an ambulance. Do not try to drive yourself. Watch closely for changes in your health, and be sure to contact your doctor if you have any problems. Where can you learn more? Go to https://OnHand.[employer]. org and sign in to your [employer] account. Enter F251 in the [employer] box to learn more about \"A Healthy Heart: Care Instructions. \"     If you do not have an account, please click on the \"Sign Up Now\" link. Current as of: December 16, 2019               Content Version: 12.5  © 2539-7340 Healthwise, Incorporated. Care instructions adapted under license by [facility] If you have questions about a medical condition or this instruction, always ask your healthcare professional. [place] [address] any warranty or liability for your use of this information.

## 2021-11-22 DIAGNOSIS — I48.0 PAF (PAROXYSMAL ATRIAL FIBRILLATION) (HCC): ICD-10-CM

## 2021-11-22 DIAGNOSIS — I10 ESSENTIAL HYPERTENSION: ICD-10-CM

## 2021-11-22 DIAGNOSIS — Z95.818 STATUS POST PLACEMENT OF IMPLANTABLE LOOP RECORDER: Primary | ICD-10-CM

## 2021-11-22 PROCEDURE — 93298 REM INTERROG DEV EVAL SCRMS: CPT | Performed by: NURSE PRACTITIONER

## 2021-11-22 PROCEDURE — G2066 INTER DEVC REMOTE 30D: HCPCS | Performed by: NURSE PRACTITIONER

## 2021-12-17 ENCOUNTER — TELEPHONE (OUTPATIENT)
Dept: CARDIOLOGY CLINIC | Age: 66
End: 2021-12-17

## 2021-12-20 ENCOUNTER — OFFICE VISIT (OUTPATIENT)
Dept: CARDIOLOGY CLINIC | Age: 66
End: 2021-12-20
Payer: MEDICARE

## 2021-12-20 VITALS
HEIGHT: 65 IN | HEART RATE: 75 BPM | DIASTOLIC BLOOD PRESSURE: 62 MMHG | OXYGEN SATURATION: 98 % | WEIGHT: 209 LBS | SYSTOLIC BLOOD PRESSURE: 122 MMHG | BODY MASS INDEX: 34.82 KG/M2

## 2021-12-20 DIAGNOSIS — E78.2 MIXED HYPERLIPIDEMIA: ICD-10-CM

## 2021-12-20 DIAGNOSIS — I48.0 PAF (PAROXYSMAL ATRIAL FIBRILLATION) (HCC): Primary | ICD-10-CM

## 2021-12-20 DIAGNOSIS — Z79.01 CHRONIC ANTICOAGULATION: ICD-10-CM

## 2021-12-20 DIAGNOSIS — I10 ESSENTIAL HYPERTENSION: ICD-10-CM

## 2021-12-20 DIAGNOSIS — Z95.818 STATUS POST PLACEMENT OF IMPLANTABLE LOOP RECORDER: ICD-10-CM

## 2021-12-20 PROCEDURE — 1036F TOBACCO NON-USER: CPT | Performed by: NURSE PRACTITIONER

## 2021-12-20 PROCEDURE — G8400 PT W/DXA NO RESULTS DOC: HCPCS | Performed by: NURSE PRACTITIONER

## 2021-12-20 PROCEDURE — 93291 INTERROG DEV EVAL SCRMS IP: CPT | Performed by: NURSE PRACTITIONER

## 2021-12-20 PROCEDURE — 4040F PNEUMOC VAC/ADMIN/RCVD: CPT | Performed by: NURSE PRACTITIONER

## 2021-12-20 PROCEDURE — 1090F PRES/ABSN URINE INCON ASSESS: CPT | Performed by: NURSE PRACTITIONER

## 2021-12-20 PROCEDURE — G8484 FLU IMMUNIZE NO ADMIN: HCPCS | Performed by: NURSE PRACTITIONER

## 2021-12-20 PROCEDURE — 99214 OFFICE O/P EST MOD 30 MIN: CPT | Performed by: NURSE PRACTITIONER

## 2021-12-20 PROCEDURE — G8427 DOCREV CUR MEDS BY ELIG CLIN: HCPCS | Performed by: NURSE PRACTITIONER

## 2021-12-20 PROCEDURE — 1123F ACP DISCUSS/DSCN MKR DOCD: CPT | Performed by: NURSE PRACTITIONER

## 2021-12-20 PROCEDURE — 3017F COLORECTAL CA SCREEN DOC REV: CPT | Performed by: NURSE PRACTITIONER

## 2021-12-20 PROCEDURE — G8417 CALC BMI ABV UP PARAM F/U: HCPCS | Performed by: NURSE PRACTITIONER

## 2021-12-20 NOTE — PROGRESS NOTES
[facility], [place]. [address], [facility] [address], [address] [facility]  [phone] office  [phone] fax      OFFICE VISIT:  2021    Laura Henson - : 1955  Reason For Visit:  Mukesh Mayes is a 78 y.o. female who is here for 6 Month Follow-Up (Patient is here for a recheck on Loop) and Atrial Fibrillation    History:   Diagnosis Orders   1. PAF (paroxysmal atrial fibrillation) ([address]     2. Chronic anticoagulation     3. Essential hypertension     4. Mixed hyperlipidemia     5. Status post placement of implantable loop recorder       The patient presents today for cardiology follow up nad loop recorder interrogation. The patient denies symptoms to suggest myocardial ischemia, heart failure or arrhythmia. BP is well controlled on current regimen. The patient's PCP monitors cholesterol. Subjective  Emerita denies exertional chest pain, shortness of breath, orthopnea, paroxysmal nocturnal dyspnea, syncope, presyncope, sensed arrhythmia, edema and fatigue. The patient denies numbness or weakness to suggest cerebrovascular accident or transient ischemic attack.       Laura Henson has the following history as recorded in [facility]:  Patient Active Problem List   Diagnosis Code    PAF (paroxysmal atrial fibrillation) ([facility]) I48.0    Abnormal stress echo R94.39    Precordial pain R07.2    Atrial fibrillation with RVR (HCC) I48.91    Essential hypertension I10    Rhinorrhea J34.89    Chronic anticoagulation Z79.01    Acquired hypothyroidism E03.9    Hyperlipidemia E78.5    Hyperglycemia R73.9    Atrial fibrillation (HCC) I48.91     Past Medical History:   Diagnosis Date    Atrial fibrillation ([address]     Disease of blood and blood forming organ     DVT in right leg     Hyperlipidemia 3/17/2015    Hypertension     Hyperthyroidism     Sleep apnea     Umbilical hernia      Past Surgical History:   Procedure Laterality Date    ABDOMEN SURGERY      CARDIAC CATHETERIZATION      COLONOSCOPY      CYSTOSCOPY      HYSTERECTOMY       Family History   Problem Relation Age of Onset    Heart Attack Mother     High Blood Pressure Father     Pacemaker Father     Diabetes Sister     High Blood Pressure Sister      Social History     Tobacco Use    Smoking status: Never Smoker    Smokeless tobacco: Never Used   Substance Use Topics    Alcohol use: No      Current Outpatient Medications   Medication Sig Dispense Refill    Ascorbic Acid (VITAMIN C) 250 MG tablet Take 1,000 mg by mouth daily       apixaban (ELIQUIS) 5 MG TABS tablet Take 1 tablet by mouth 2 times daily 60 tablet 5    olmesartan (BENICAR) 20 MG tablet Take 0.5 tablets by mouth 2 times daily (Patient taking differently: Take 20 mg by mouth daily If needed 10 mg taken at pm.) 90 tablet 3    cloNIDine (CATAPRES) 0.1 MG tablet Take 1 tablet by mouth 2 times daily as needed for High Blood Pressure (for blood pressure > 180/100) 30 tablet 3    propafenone (RYTHMOL) 150 MG tablet Take 1 tablet by mouth every 8 hours 90 tablet 3    Zinc Sulfate 220 (50 Zn) MG TABS Take 50 mg by mouth daily       Cholecalciferol (VITAMIN D3 PO) Take 1,000 mg by mouth daily      calcium carbonate (OSCAL) 500 MG TABS tablet Take 500 mg by mouth daily      Biotin 1 MG CAPS Take by mouth      spironolactone (ALDACTONE) 25 MG tablet Take 25 mg by mouth daily      Omega-3 Fatty Acids (FISH OIL) 1000 MG CAPS Take 1,000 mg by mouth 2 times daily       levothyroxine (SYNTHROID) 125 MCG tablet Take 125 mcg by mouth Daily       No current facility-administered medications for this visit. Allergies: Patient has no known allergies. Review of Systems  Constitutional  no appetite change, or unexpected weight change. No fever, chills or diaphoresis. No significant change in activity level or new onset of fatigue. HEENT  no significant rhinorrhea or epistaxis.  No tinnitus or significant hearing rub.  No murmur. No lower extremity varicosities. No carotid bruits. Abdominal -  No visible distention, mass or pulsations. Extremities - No clubbing or cyanosis. No statis dermatitis or ulcers. No edema. Musculoskeletal -   No Osler's nodes. No kyphosis or scoliosis. Gait is even and regular without limp or shuffle. Ambulates without assistance. Skin -  Warm and dry; no rash or pallor. No new surgical wound. Neurological - No focal neurological deficits. Thought processes coherent. No apparent tremor. Oriented to person, place and time. Psychiatric -  Appropriate affect and mood. Data reviewed:  9/18/21 echo  Normal size left atrium. Normal left ventricular size with preserved LV function and an estimated  ejection fraction of approximately 55-60%. No evidence of left ventricular mass or thrombus noted. Normal right atrial dimension with no evidence of thrombus or mass noted. Normal right ventricular size with preserved RV function. Structurally normal mitral valve with normal leaflet mobility. No evidence  of mitral valve stenosis or mild mitral regurgitation. Aortic valve appears to be tricuspid. Structurally normal aortic valve. No significant aortic regurgitation or stenosis is noted. Tricuspid valve is structurally normal.   No evidence of tricuspid regurgitation.    Signature   Electronically signed by Kathy Dewey MD(Interpreting   physician) on 09/19/2021 02:11 PM    Lab Results   Component Value Date    WBC 6.2 09/25/2021    HGB 12.9 09/25/2021    HCT 40.1 09/25/2021    MCV 97.3 09/25/2021     09/25/2021     Lab Results   Component Value Date     09/25/2021    K 4.2 09/25/2021     09/25/2021    CO2 24 09/25/2021    BUN 16 09/25/2021    CREATININE 0.5 09/25/2021    GLUCOSE 117 (H) 09/25/2021    CALCIUM 9.7 09/25/2021    PROT 6.8 09/25/2021    LABALBU 4.1 09/25/2021    BILITOT 0.4 09/25/2021    ALKPHOS 112 (H) 09/25/2021    AST 19 09/25/2021    ALT 24 09/25/2021    LABGLOM >60 09/25/2021    GFRAA >59 09/25/2021       Lab Results   Component Value Date    CHOL 163 09/19/2021     Lab Results   Component Value Date    TRIG 181 (H) 09/19/2021     Lab Results   Component Value Date    HDL 56 (L) 09/19/2021     Lab Results   Component Value Date    LDLCALC 71 09/19/2021       BP Readings from Last 3 Encounters:   12/20/21 (!) 140/90   10/19/21 (!) 140/70   09/25/21 (!) 144/70    Pulse Readings from Last 3 Encounters:   12/20/21 75   10/19/21 72   09/25/21 67        Wt Readings from Last 3 Encounters:   12/20/21 209 lb (94.8 kg)   10/19/21 207 lb (93.9 kg)   09/25/21 198 lb (89.8 kg)     Assessment/Plan:   Diagnosis Orders   1. PAF (paroxysmal atrial fibrillation) ([facility]     2. Chronic anticoagulation     3. Essential hypertension     4. Mixed hyperlipidemia     5. Status post placement of implantable loop recorder       [id number] Score: 3  Disclaimer: Risk Score calculation is dependent on accuracy of patient problem list and past encounter diagnosis. Loop recorder interrogation:  Battery status good. No symptom, tachy, pause, dior or AT/AF episodes. Stable CV status without overt heart failure, sensed arrhythmia or angina. PAF - no recurrent AF on Rythmol. Continue same. No bleeding issue on Eliquis. HTN - normotensive on current regimen. Continue same. Hyperlipidemia - LDL 71. Patient is compliant with medication regimen. Previous cardiac history and records reviewed. Continue current medical management for cardiac related condition. Continue other current medications as directed. Continue to follow up with primary care provider for non cardiac medical problems. If your primary care provider is outside of the [facility], please request that your labs be faxed to this office at [phone]. BP goal 130/80 or less. Call the office with any problems, questions or concerns at [phone]. Cardiology follow up as scheduled in [address] appointments. Educational included in patient instructions. Heart health.       CHUCK Fernandez

## 2021-12-20 NOTE — PATIENT INSTRUCTIONS
New instructions for today:  Eliquis can increase your risk of bleeding. If you notice blood in urine or stool, bleeding gums, excessive bruising or cough productive of bloody sputum, notify the office. Information on this blood thinner has been included in your after visit summary. Monitor your blood pressure twice daily and keep a log. Your blood pressure goal is 130/80 or less. We will discuss in 2 weeks by either scheduled telephone encounter or patient call back. Office phone number [phone]. Patient Instructions:  Continue current medications as prescribed. Always keep a current medication list. Bring your medications to every office visit. Continue to follow up with primary care provider for non cardiac medical problems. Call the office with any problems, questions or concerns at [phone]. If you have been asked to keep a blood pressure log, do so for 2 weeks. Call the office to report readings to the triage nurse at [phone]. Follow up with cardiologist as scheduled. The following educational material has been included in this after visit summary for your review: Life simple 7. Heart health. Life simple 7  1) Manage blood pressure - high blood pressure is a major risk factor for heart disease and stroke. Keeping blood pressure in health range reduces strain on your heart, arteries and kidneys. Blood pressure goal is less than 130/80. 2) Control cholesterol - contributes to plaque, which can clog arteries and lead to heart disease and stroke. When you control your cholesterol you are giving your arteries their best chance to remain clear. It is recommended that you get cholesterol lab work done once a year. 3) Reduce blood sugar - most of the food we eat is turning into glucose or blood sugar that our body uses for energy. Over time, high levels of blood sugar can damage your heart, kidneys, eyes and nerves.   4) Get active - living an active life is one of the most rewarding gifts you can give yourself and those you love. Simply put, daily physical activity increases your length and quality of life. Strive to exercise 15 minutes most days of the week. 5)  Eat better - A healthy diet is one of your best weapons for fighting cardiovascular disease. When you eat a heart healthy diet, you improve your chances for feeling good and staying healthy for life. 6)  Lose weight - when you shed extra fat an unnecessary pounds, you reduce the burden on your hear, lungs, blood vessels and skeleton. You give yourself the gift of active living, you lower your blood pressure and help yourself feel better. 7) Stop smoking - cigarette smokers have a higher risk of developing cardiovascular disease. If  You smoke, quitting is the best thing you can do for your health. Check American Heart Association on line for more information on Life's Simple 7 and tips for healthy living. A Healthy Heart: Care Instructions  Your Care Instructions     Coronary artery disease, also called heart disease, occurs when a substance called plaque builds up in the vessels that supply oxygen-rich blood to your heart muscle. This can narrow the blood vessels and reduce blood flow. A heart attack happens when blood flow is completely blocked. A high-fat diet, smoking, and other factors increase the risk of heart disease. Your doctor has found that you have a chance of having heart disease. You can do lots of things to keep your heart healthy. It may not be easy, but you can change your diet, exercise more, and quit smoking. These steps really work to lower your chance of heart disease. Follow-up care is a key part of your treatment and safety. Be sure to make and go to all appointments, and call your doctor if you are having problems. It's also a good idea to know your test results and keep a list of the medicines you take. How can you care for yourself at home?   Diet  · Use less salt when you cook and eat. This helps lower your blood pressure. Taste food before salting. Add only a little salt when you think you need it. With time, your taste buds will adjust to less salt. · Eat fewer snack items, fast foods, canned soups, and other high-salt, high-fat, processed foods. · Read food labels and try to avoid saturated and trans fats. They increase your risk of heart disease by raising cholesterol levels. · Limit the amount of solid fat-butter, margarine, and shortening-you eat. Use olive, peanut, or canola oil when you cook. Bake, broil, and steam foods instead of frying them. · Eat a variety of fruit and vegetables every day. Dark green, deep orange, red, or yellow fruits and vegetables are especially good for you. Examples include spinach, carrots, peaches, and berries. · Foods high in fiber can reduce your cholesterol and provide important vitamins and minerals. High-fiber foods include whole-grain cereals and breads, oatmeal, beans, brown rice, citrus fruits, and apples. · Eat lean proteins. Heart-healthy proteins include seafood, lean meats and poultry, eggs, beans, peas, nuts, seeds, and soy products. · Limit drinks and foods with added sugar. These include candy, desserts, and soda pop. Lifestyle changes  · If your doctor recommends it, get more exercise. Walking is a good choice. Bit by bit, increase the amount you walk every day. Try for at least 30 minutes on most days of the week. You also may want to swim, bike, or do other activities. · Do not smoke. If you need help quitting, talk to your doctor about stop-smoking programs and medicines. These can increase your chances of quitting for good. Quitting smoking may be the most important step you can take to protect your heart. It is never too late to quit. · Limit alcohol to 2 drinks a day for men and 1 drink a day for women. Too much alcohol can cause health problems.   · Manage other health problems such as diabetes, high blood pressure, and high cholesterol. If you think you may have a problem with alcohol or drug use, talk to your doctor. Medicines  · Take your medicines exactly as prescribed. Call your doctor if you think you are having a problem with your medicine. · If your doctor recommends aspirin, take the amount directed each day. Make sure you take aspirin and not another kind of pain reliever, such as acetaminophen (Tylenol). When should you call for help? [id number] if you have symptoms of a heart attack. These may include:  · Chest pain or pressure, or a strange feeling in the chest.  · Sweating. · Shortness of breath. · Pain, pressure, or a strange feeling in the back, neck, jaw, or upper belly or in one or both shoulders or arms. · Lightheadedness or sudden weakness. · A fast or irregular heartbeat. After you call 911, the  may tell you to chew 1 adult-strength or 2 to 4 low-dose aspirin. Wait for an ambulance. Do not try to drive yourself. Watch closely for changes in your health, and be sure to contact your doctor if you have any problems. Where can you learn more? Go to https://Ref[employer].tv.[employer]homemove. org and sign in to your [employer] account. Enter O459 in the [employer] box to learn more about \"A Healthy Heart: Care Instructions. \"     If you do not have an account, please click on the \"Sign Up Now\" link. Current as of: December 16, 2019               Content Version: 12.5  © 5084-0660 Healthwise, Incorporated. Care instructions adapted under license by [facility] ([facility]). If you have questions about a medical condition or this instruction, always ask your healthcare professional. [place] [address] any warranty or liability for your use of this information.

## 2022-01-24 DIAGNOSIS — I48.0 PAF (PAROXYSMAL ATRIAL FIBRILLATION) (HCC): ICD-10-CM

## 2022-01-24 DIAGNOSIS — Z95.818 STATUS POST PLACEMENT OF IMPLANTABLE LOOP RECORDER: Primary | ICD-10-CM

## 2022-01-24 PROCEDURE — G2066 INTER DEVC REMOTE 30D: HCPCS | Performed by: NURSE PRACTITIONER

## 2022-01-24 PROCEDURE — 93298 REM INTERROG DEV EVAL SCRMS: CPT | Performed by: NURSE PRACTITIONER

## 2022-02-22 ENCOUNTER — OFFICE VISIT (OUTPATIENT)
Dept: CARDIOLOGY CLINIC | Age: 67
End: 2022-02-22
Payer: MEDICARE

## 2022-02-22 VITALS
BODY MASS INDEX: 35.16 KG/M2 | WEIGHT: 211 LBS | HEART RATE: 71 BPM | DIASTOLIC BLOOD PRESSURE: 78 MMHG | HEIGHT: 65 IN | SYSTOLIC BLOOD PRESSURE: 138 MMHG | OXYGEN SATURATION: 96 %

## 2022-02-22 DIAGNOSIS — E78.2 MIXED HYPERLIPIDEMIA: ICD-10-CM

## 2022-02-22 DIAGNOSIS — I10 ESSENTIAL HYPERTENSION: ICD-10-CM

## 2022-02-22 DIAGNOSIS — Z95.818 STATUS POST PLACEMENT OF IMPLANTABLE LOOP RECORDER: ICD-10-CM

## 2022-02-22 DIAGNOSIS — I48.0 PAF (PAROXYSMAL ATRIAL FIBRILLATION) (HCC): Primary | ICD-10-CM

## 2022-02-22 DIAGNOSIS — Z79.01 CHRONIC ANTICOAGULATION: ICD-10-CM

## 2022-02-22 PROCEDURE — G8417 CALC BMI ABV UP PARAM F/U: HCPCS | Performed by: NURSE PRACTITIONER

## 2022-02-22 PROCEDURE — 1123F ACP DISCUSS/DSCN MKR DOCD: CPT | Performed by: NURSE PRACTITIONER

## 2022-02-22 PROCEDURE — G8400 PT W/DXA NO RESULTS DOC: HCPCS | Performed by: NURSE PRACTITIONER

## 2022-02-22 PROCEDURE — 93291 INTERROG DEV EVAL SCRMS IP: CPT | Performed by: NURSE PRACTITIONER

## 2022-02-22 PROCEDURE — 4040F PNEUMOC VAC/ADMIN/RCVD: CPT | Performed by: NURSE PRACTITIONER

## 2022-02-22 PROCEDURE — 99214 OFFICE O/P EST MOD 30 MIN: CPT | Performed by: NURSE PRACTITIONER

## 2022-02-22 PROCEDURE — G8484 FLU IMMUNIZE NO ADMIN: HCPCS | Performed by: NURSE PRACTITIONER

## 2022-02-22 PROCEDURE — 1036F TOBACCO NON-USER: CPT | Performed by: NURSE PRACTITIONER

## 2022-02-22 PROCEDURE — G8427 DOCREV CUR MEDS BY ELIG CLIN: HCPCS | Performed by: NURSE PRACTITIONER

## 2022-02-22 PROCEDURE — 1090F PRES/ABSN URINE INCON ASSESS: CPT | Performed by: NURSE PRACTITIONER

## 2022-02-22 PROCEDURE — 3017F COLORECTAL CA SCREEN DOC REV: CPT | Performed by: NURSE PRACTITIONER

## 2022-02-22 NOTE — PROGRESS NOTES
[facility], [place]. [address], [facility] [address] [facility]  [phone] office  [phone] fax      OFFICE VISIT:  2022    Tres Frazier - : 1955  Reason For Visit:  Radha Britton is a 78 y.o. female who is here for Follow-up (2 month follow up on loop, patient had an episode of weakness and maybe some fluttering? But otherwise she has no complaints. ) and Atrial Fibrillation    History:   Diagnosis Orders   1. PAF (paroxysmal atrial fibrillation) ([address]     2. Essential hypertension     3. Chronic anticoagulation     4. Mixed hyperlipidemia     5. Status post placement of implantable loop recorder       The patient presents today for cardiology follow up. Emerita reports \"I felt something this morning a little weak and faint for about 5 seconds and used the symptom marker. \"  Interrogation today shows no symptom, dior, tachy, pause or AF/AF episodes. Current rhythm NSR 67 bpm.  Tray Rock RN, triggered a symptom episode in the office. Subsequently, the device will record for 7 minutes and forward a report tomorrow for review. The patient reports no bleeding issues on Eliquis. Review of home BP log shows overall good control with readings less than 130/80 consistently. The patient denies symptoms to suggest myocardial ischemia, heart failure or sustained arrhythmia. BP is well controlled on current regimen. The patient's PCP monitors cholesterol. Subjective  Emerita denies exertional chest pain, shortness of breath, orthopnea, paroxysmal nocturnal dyspnea, syncope, presyncope, sensed arrhythmia, edema and fatigue. The patient denies numbness or weakness to suggest cerebrovascular accident or transient ischemic attack. + fleeting lightheaded episode this am.  No definite sensed arrhythmia. Loop interrogation showed no episodes today.     Tres Frazier has the following history as recorded in [employer]:  Patient Active Problem List   Diagnosis Code    PAF (paroxysmal atrial fibrillation) ([facility]) I48.0    Abnormal stress echo R94.39    Precordial pain R07.2    Atrial fibrillation with RVR ([facility]) I48.91    Essential hypertension I10    Rhinorrhea J34.89    Chronic anticoagulation Z79.01    Acquired hypothyroidism E03.9    Hyperlipidemia E78.5    Hyperglycemia R73.9    Atrial fibrillation ([facility]) I48.91     Past Medical History:   Diagnosis Date    Atrial fibrillation ([facility])     Disease of blood and blood forming organ     DVT in right leg     Hyperlipidemia 3/17/2015    Hypertension     Hyperthyroidism     Sleep apnea     Umbilical hernia      Past Surgical History:   Procedure Laterality Date    ABDOMEN SURGERY      CARDIAC CATHETERIZATION      COLONOSCOPY      CYSTOSCOPY      HYSTERECTOMY       Family History   Problem Relation Age of Onset    Heart Attack Mother     High Blood Pressure Father     Pacemaker Father     Diabetes Sister     High Blood Pressure Sister      Social History     Tobacco Use    Smoking status: Never Smoker    Smokeless tobacco: Never Used   Substance Use Topics    Alcohol use: No      Current Outpatient Medications   Medication Sig Dispense Refill    Ascorbic Acid (VITAMIN C) 250 MG tablet Take 250 mg by mouth three times daily       apixaban (ELIQUIS) 5 MG TABS tablet Take 1 tablet by mouth 2 times daily 60 tablet 5    olmesartan (BENICAR) 20 MG tablet Take 0.5 tablets by mouth 2 times daily (Patient taking differently: Take 20 mg by mouth daily If needed 10 mg taken at pm.) 90 tablet 3    cloNIDine (CATAPRES) 0.1 MG tablet Take 1 tablet by mouth 2 times daily as needed for High Blood Pressure (for blood pressure > 180/100) 30 tablet 3    propafenone (RYTHMOL) 150 MG tablet Take 1 tablet by mouth every 8 hours 90 tablet 3    Zinc Sulfate 220 (50 Zn) MG TABS Take 50 mg by mouth daily       Cholecalciferol (VITAMIN D3 PO) Take 1,000 mg by mouth daily      calcium carbonate (OSCAL) 500 MG TABS tablet Take 500 mg by mouth daily      Biotin 1 MG CAPS Take by mouth      spironolactone (ALDACTONE) 25 MG tablet Take 25 mg by mouth daily      Omega-3 Fatty Acids (FISH OIL) 1000 MG CAPS Take 1,000 mg by mouth 2 times daily       levothyroxine (SYNTHROID) 125 MCG tablet Take 125 mcg by mouth Daily       No current facility-administered medications for this visit. Allergies: Patient has no known allergies. Review of Systems  Constitutional  no appetite change, or unexpected weight change. No fever, chills or diaphoresis. No significant change in activity level or new onset of fatigue. HEENT  no significant rhinorrhea or epistaxis. No tinnitus or significant hearing loss. Eyes  no sudden vision change or amaurosis. No corneal arcus, xantholasma, subconjunctival hemorrhage or discharge. Respiratory  no significant wheezing, stridor, apnea or cough. No dyspnea on exertion or shortness of air. Cardiovascular  no exertional chest pain to suggest myocardial ischemia. No orthopnea or PND. No sensation of sustained arrythmia. No occurrence of slow heart rate. No palpitations. No claudication. + fleeting lightheaded episode this am.  No definite sensed arrhythmia. Loop interrogation showed no episodes today. Gastrointestinal  no abdominal swelling or pain. No blood in stool. No severe constipation, diarrhea, nausea, or vomiting. Genitourinary  no dysuria, frequency, or urgency. No flank pain or hematuria. Musculoskeletal  no back pain or myalgia. No problems with gait. Extremities - no clubbing, cyanosis or extremity edema. Skin  no color change or rash. No pallor. No new surgical incision. Neurologic  no speech difficulty, facial asymmetry or lateralizing weakness. No seizures, presyncope or syncope. No significant dizziness. Hematologic  no easy bruising or excessive bleeding. Psychiatric  no severe anxiety or insomnia. No confusion. All other review of systems are negative. Objective  Vital Signs - /78   Pulse 71   Ht 5' 5\" (1.651 m)   Wt 211 lb (95.7 kg)   SpO2 96%   BMI 35.11 kg/m²   General - Emerita is alert, cooperative, and pleasant. Well groomed. No acute distress. Body habitus - Body mass index is 35.11 kg/m². HEENT  Head is normocephalic. No circumoral cyanosis. Dentition is normal.  EYES -   Lids normal without ptosis. No discharge, edema or subconjunctival hemorrhage. Neck - Symmetrical without apparent mass or lymphadenopathy. Respiratory - Normal respiratory effort without use of accessory muscles. Ausculatation reveals vesicular breath sounds without crackles, wheezes, rub or rhonchi. Cardiovascular  No jugular venous distention. Auscultation reveals regular rate and rhythm. No audible clicks, gallop or rub. No murmur. No lower extremity varicosities. No carotid bruits. Abdominal -  No visible distention, mass or pulsations. Extremities - No clubbing or cyanosis. No statis dermatitis or ulcers. No edema. Musculoskeletal -   No Osler's nodes. No kyphosis or scoliosis. Gait is even and regular without limp or shuffle. Ambulates without assistance. Skin -  Warm and dry; no rash or pallor. No new surgical wound. Neurological - No focal neurological deficits. Thought processes coherent. No apparent tremor. Oriented to person, place and time. Psychiatric -  Appropriate affect and mood. Data reviewed:  9/18/21 echo   Normal size left atrium. Normal left ventricular size with preserved LV function and an estimated  ejection fraction of approximately 55-60%. No evidence of left ventricular mass or thrombus noted. Normal right atrial dimension with no evidence of thrombus or mass noted. Normal right ventricular size with preserved RV function. Structurally normal mitral valve with normal leaflet mobility.  No evidence  of mitral valve stenosis or mild mitral bpm.  No symptom episodes recorded. Symptom marker used today in office to troubleshoot as patient marked symptom this AM.  A transmission will probably come through tomorrow per Torsten Azul RN. Stable CV status without overt heart failure, sensed arrhythmia or angina. PAF - continues in Rythmol. Currently in NSR. No bleeding issue on Eliquis. Continue same. HTN - overall good BP control as evidence by thorough BP log. Goal less than 130/80. Continue same. Hyperlipidemia - LDL 71.  Labs are followed by PCP. Patient is compliant with medication regimen. Previous cardiac history and records reviewed. Continue current medical management for cardiac related condition. Continue other current medications as directed. Continue to follow up with primary care provider for non cardiac medical problems. If your primary care provider is outside of the [facility], please request that your labs be faxed to this office at [phone]. BP goal 130/80 or less. Call the office with any problems, questions or concerns at [phone]. Cardiology follow up as scheduled in [address] appointments. Educational included in patient instructions. Heart health.       Jyoti Randle, CHUCK

## 2022-02-22 NOTE — PATIENT INSTRUCTIONS
New instructions for today:  Eliquis can increase your risk of bleeding. If you notice blood in urine or stool, bleeding gums, excessive bruising or cough productive of bloody sputum, notify the office. Information on this blood thinner has been included in your after visit summary. Patient Instructions:  Continue current medications as prescribed. Always keep a current medication list. Bring your medications to every office visit. Continue to follow up with primary care provider for non cardiac medical problems. Call the office with any problems, questions or concerns at [phone]. If you have been asked to keep a blood pressure log, do so for 2 weeks. Call the office to report readings to the triage nurse at [phone]. Follow up with cardiologist as scheduled. The following educational material has been included in this after visit summary for your review: Life simple 7. Heart health. Life simple 7  1) Manage blood pressure - high blood pressure is a major risk factor for heart disease and stroke. Keeping blood pressure in health range reduces strain on your heart, arteries and kidneys. Blood pressure goal is less than 130/80. 2) Control cholesterol - contributes to plaque, which can clog arteries and lead to heart disease and stroke. When you control your cholesterol you are giving your arteries their best chance to remain clear. It is recommended that you get cholesterol lab work done once a year. 3) Reduce blood sugar - most of the food we eat is turning into glucose or blood sugar that our body uses for energy. Over time, high levels of blood sugar can damage your heart, kidneys, eyes and nerves. 4) Get active - living an active life is one of the most rewarding gifts you can give yourself and those you love. Simply put, daily physical activity increases your length and quality of life. Strive to exercise 15 minutes most days of the week.   5)  Eat better - A healthy diet is one of your best weapons for fighting cardiovascular disease. When you eat a heart healthy diet, you improve your chances for feeling good and staying healthy for life. 6)  Lose weight - when you shed extra fat an unnecessary pounds, you reduce the burden on your hear, lungs, blood vessels and skeleton. You give yourself the gift of active living, you lower your blood pressure and help yourself feel better. 7) Stop smoking - cigarette smokers have a higher risk of developing cardiovascular disease. If  You smoke, quitting is the best thing you can do for your health. Check American Heart Association on line for more information on Life's Simple 7 and tips for healthy living. A Healthy Heart: Care Instructions  Your Care Instructions     Coronary artery disease, also called heart disease, occurs when a substance called plaque builds up in the vessels that supply oxygen-rich blood to your heart muscle. This can narrow the blood vessels and reduce blood flow. A heart attack happens when blood flow is completely blocked. A high-fat diet, smoking, and other factors increase the risk of heart disease. Your doctor has found that you have a chance of having heart disease. You can do lots of things to keep your heart healthy. It may not be easy, but you can change your diet, exercise more, and quit smoking. These steps really work to lower your chance of heart disease. Follow-up care is a key part of your treatment and safety. Be sure to make and go to all appointments, and call your doctor if you are having problems. It's also a good idea to know your test results and keep a list of the medicines you take. How can you care for yourself at home? Diet  · Use less salt when you cook and eat. This helps lower your blood pressure. Taste food before salting. Add only a little salt when you think you need it. With time, your taste buds will adjust to less salt.   · Eat fewer snack items, fast foods, canned soups, and other high-salt, high-fat, processed foods. · Read food labels and try to avoid saturated and trans fats. They increase your risk of heart disease by raising cholesterol levels. · Limit the amount of solid fat-butter, margarine, and shortening-you eat. Use olive, peanut, or canola oil when you cook. Bake, broil, and steam foods instead of frying them. · Eat a variety of fruit and vegetables every day. Dark green, deep orange, red, or yellow fruits and vegetables are especially good for you. Examples include spinach, carrots, peaches, and berries. · Foods high in fiber can reduce your cholesterol and provide important vitamins and minerals. High-fiber foods include whole-grain cereals and breads, oatmeal, beans, brown rice, citrus fruits, and apples. · Eat lean proteins. Heart-healthy proteins include seafood, lean meats and poultry, eggs, beans, peas, nuts, seeds, and soy products. · Limit drinks and foods with added sugar. These include candy, desserts, and soda pop. Lifestyle changes  · If your doctor recommends it, get more exercise. Walking is a good choice. Bit by bit, increase the amount you walk every day. Try for at least 30 minutes on most days of the week. You also may want to swim, bike, or do other activities. · Do not smoke. If you need help quitting, talk to your doctor about stop-smoking programs and medicines. These can increase your chances of quitting for good. Quitting smoking may be the most important step you can take to protect your heart. It is never too late to quit. · Limit alcohol to 2 drinks a day for men and 1 drink a day for women. Too much alcohol can cause health problems. · Manage other health problems such as diabetes, high blood pressure, and high cholesterol. If you think you may have a problem with alcohol or drug use, talk to your doctor. Medicines  · Take your medicines exactly as prescribed.  Call your doctor if you think you are having a problem with your medicine. · If your doctor recommends aspirin, take the amount directed each day. Make sure you take aspirin and not another kind of pain reliever, such as acetaminophen (Tylenol). When should you call for help? [id number] if you have symptoms of a heart attack. These may include:  · Chest pain or pressure, or a strange feeling in the chest.  · Sweating. · Shortness of breath. · Pain, pressure, or a strange feeling in the back, neck, jaw, or upper belly or in one or both shoulders or arms. · Lightheadedness or sudden weakness. · A fast or irregular heartbeat. After you call 911, the  may tell you to chew 1 adult-strength or 2 to 4 low-dose aspirin. Wait for an ambulance. Do not try to drive yourself. Watch closely for changes in your health, and be sure to contact your doctor if you have any problems. Where can you learn more? Go to https://[employer].ERN. org and sign in to your [employer] account. Enter A497 in the [employer] box to learn more about \"A Healthy Heart: Care Instructions. \"     If you do not have an account, please click on the \"Sign Up Now\" link. Current as of: December 16, 2019               Content Version: 12.5  © 5960-7370 [employer]wise, Incorporated. Care instructions adapted under license by [facility] If you have questions about a medical condition or this instruction, always ask your healthcare professional. [place] [address] any warranty or liability for your use of this information.

## 2022-02-23 ENCOUNTER — TELEPHONE (OUTPATIENT)
Dept: CARDIOLOGY CLINIC | Age: 67
End: 2022-02-23

## 2022-02-23 ENCOUNTER — NURSE ONLY (OUTPATIENT)
Dept: CARDIOLOGY CLINIC | Age: 67
End: 2022-02-23
Payer: MEDICARE

## 2022-02-23 DIAGNOSIS — Z45.09 ENCOUNTER FOR LOOP RECORDER CHECK: Primary | ICD-10-CM

## 2022-02-23 PROCEDURE — 93285 PRGRMG DEV EVAL SCRMS IP: CPT | Performed by: NURSE PRACTITIONER

## 2022-02-23 NOTE — TELEPHONE ENCOUNTER
Patient was seen in office yesterday and did a test with her patient symptom activator. We did not received the event report. Reviewed her careOpen CS loop recorder summary report. The symptom parameter is turned off. Patient will need to come by the office to have the symptom parameter turned on. Notified patient. She will let us know when she can come in.

## 2022-03-28 DIAGNOSIS — I48.0 PAF (PAROXYSMAL ATRIAL FIBRILLATION) (HCC): ICD-10-CM

## 2022-03-28 DIAGNOSIS — Z95.818 STATUS POST PLACEMENT OF IMPLANTABLE LOOP RECORDER: Primary | ICD-10-CM

## 2022-03-29 PROCEDURE — G2066 INTER DEVC REMOTE 30D: HCPCS | Performed by: NURSE PRACTITIONER

## 2022-03-29 PROCEDURE — 93298 REM INTERROG DEV EVAL SCRMS: CPT | Performed by: NURSE PRACTITIONER

## 2022-03-30 DIAGNOSIS — I10 ESSENTIAL HYPERTENSION: ICD-10-CM

## 2022-03-30 DIAGNOSIS — I48.0 PAF (PAROXYSMAL ATRIAL FIBRILLATION) (HCC): ICD-10-CM

## 2022-03-30 DIAGNOSIS — Z95.818 STATUS POST PLACEMENT OF IMPLANTABLE LOOP RECORDER: Primary | ICD-10-CM

## 2022-04-01 DIAGNOSIS — Z95.818 STATUS POST PLACEMENT OF IMPLANTABLE LOOP RECORDER: Primary | ICD-10-CM

## 2022-04-01 DIAGNOSIS — I10 ESSENTIAL HYPERTENSION: ICD-10-CM

## 2022-04-01 DIAGNOSIS — I48.0 PAF (PAROXYSMAL ATRIAL FIBRILLATION) (HCC): ICD-10-CM

## 2022-04-18 DIAGNOSIS — Z95.818 STATUS POST PLACEMENT OF IMPLANTABLE LOOP RECORDER: Primary | ICD-10-CM

## 2022-04-18 DIAGNOSIS — R00.2 PALPITATIONS: ICD-10-CM

## 2022-04-28 DIAGNOSIS — I48.0 PAF (PAROXYSMAL ATRIAL FIBRILLATION) (HCC): ICD-10-CM

## 2022-04-28 DIAGNOSIS — R00.2 PALPITATIONS: ICD-10-CM

## 2022-04-28 DIAGNOSIS — Z95.818 STATUS POST PLACEMENT OF IMPLANTABLE LOOP RECORDER: Primary | ICD-10-CM

## 2022-04-29 PROCEDURE — 93298 REM INTERROG DEV EVAL SCRMS: CPT | Performed by: NURSE PRACTITIONER

## 2022-04-29 PROCEDURE — G2066 INTER DEVC REMOTE 30D: HCPCS | Performed by: NURSE PRACTITIONER

## 2022-05-05 DIAGNOSIS — R00.2 PALPITATIONS: ICD-10-CM

## 2022-05-05 DIAGNOSIS — I10 ESSENTIAL HYPERTENSION: ICD-10-CM

## 2022-05-05 DIAGNOSIS — Z95.818 STATUS POST PLACEMENT OF IMPLANTABLE LOOP RECORDER: Primary | ICD-10-CM

## 2022-05-31 DIAGNOSIS — Z95.818 STATUS POST PLACEMENT OF IMPLANTABLE LOOP RECORDER: Primary | ICD-10-CM

## 2022-05-31 DIAGNOSIS — R00.2 PALPITATIONS: ICD-10-CM

## 2022-06-01 PROCEDURE — G2066 INTER DEVC REMOTE 30D: HCPCS | Performed by: NURSE PRACTITIONER

## 2022-06-01 PROCEDURE — 93298 REM INTERROG DEV EVAL SCRMS: CPT | Performed by: NURSE PRACTITIONER

## 2022-06-28 ENCOUNTER — OFFICE VISIT (OUTPATIENT)
Dept: CARDIOLOGY CLINIC | Age: 67
End: 2022-06-28
Payer: MEDICARE

## 2022-06-28 VITALS
HEIGHT: 66 IN | BODY MASS INDEX: 34.55 KG/M2 | SYSTOLIC BLOOD PRESSURE: 158 MMHG | HEART RATE: 72 BPM | WEIGHT: 215 LBS | DIASTOLIC BLOOD PRESSURE: 82 MMHG

## 2022-06-28 DIAGNOSIS — I48.0 PAF (PAROXYSMAL ATRIAL FIBRILLATION) (HCC): Primary | ICD-10-CM

## 2022-06-28 DIAGNOSIS — Z45.09 ENCOUNTER FOR LOOP RECORDER CHECK: ICD-10-CM

## 2022-06-28 PROCEDURE — 1036F TOBACCO NON-USER: CPT | Performed by: INTERNAL MEDICINE

## 2022-06-28 PROCEDURE — 99214 OFFICE O/P EST MOD 30 MIN: CPT | Performed by: INTERNAL MEDICINE

## 2022-06-28 PROCEDURE — 1090F PRES/ABSN URINE INCON ASSESS: CPT | Performed by: INTERNAL MEDICINE

## 2022-06-28 PROCEDURE — G8427 DOCREV CUR MEDS BY ELIG CLIN: HCPCS | Performed by: INTERNAL MEDICINE

## 2022-06-28 PROCEDURE — G8400 PT W/DXA NO RESULTS DOC: HCPCS | Performed by: INTERNAL MEDICINE

## 2022-06-28 PROCEDURE — G8417 CALC BMI ABV UP PARAM F/U: HCPCS | Performed by: INTERNAL MEDICINE

## 2022-06-28 PROCEDURE — 3017F COLORECTAL CA SCREEN DOC REV: CPT | Performed by: INTERNAL MEDICINE

## 2022-06-28 PROCEDURE — 93291 INTERROG DEV EVAL SCRMS IP: CPT | Performed by: INTERNAL MEDICINE

## 2022-06-28 PROCEDURE — 1123F ACP DISCUSS/DSCN MKR DOCD: CPT | Performed by: INTERNAL MEDICINE

## 2022-06-28 NOTE — PROGRESS NOTES
Loop recorder interrogated. Symptom episode correlate with PAC's. No sustained arrhythmias noted. See scanned report for details.

## 2022-06-28 NOTE — PROGRESS NOTES
HISTORY  68-year-old lady with a history of sleep apnea, hypertension, dyslipidemia, and paroxysmal atrial fibrillation returns for routine follow-up. She has been maintained on Eliquis and propafenone 150 mg 3 times daily. She has a loop recorder in place to monitor efficacy of therapy. Her lipids were most recently checked in September 2021 with an LDL of 71, HDL 56, and triglycerides of 181. On return today she tells me she has an occasional flutter but nothing with any duration. Her primary concern has been her hypertension. She takes her pressures at least twice a day and adjust her dose of Benicar based on those values. She has a recording of her home pressures to review. She denies chest discomfort or any symptoms that would suggest left-ventricular dysfunction. PHYSICAL EXAM  On exam carries 215 pounds in a 5 foot 6 inch frame. Pressure was 158/82 on first check and 162/82 on repeat with a pulse of 72. EOMs full, sclerae and conjunctiva normal. PERRLA. Mask in place. Trachea midline with no neck masses. Assessment of internal jugular veins reveals no elevation of central venous pressure at 45 degrees. Carotid pulses normal without delay or bruit. Thyroid normal to palpation. Loop recorder in place. Chest exam reveals normal respiratory effort, no abnormal breath sounds and normal expiratory phase. No skin lesions seen. PMI normal. S1, S2 normal without murmur or kane or click. Normal bowel sounds without palpable mass or bruit. No clubbing or acrocyanosis. No significant lower extremity edema or signs of venous insufficiency. General motor strength appears to be within normal limits. Normal range of motion with normal gait. Alert, oriented x 3, memory and cognition normal as reflected by history and conversation.   EKG reveals a sinus rhythm with a pretransitional Q wave precluding exclusion of a previous anterior septal infarct, and intraventricular conduction delay, and some nonspecific ST-T wave changes most notably in the lateral leads. [No significant change from previous tracing]. ASSESSMENT/PLAN:   1. Hypertension -advised her to take 0.1 mg of clonidine each night at bedtime while continuing the Benicar and spironolactone. She is going to monitor a few pressures over several days on this regimen and inform us of those values. 2.  Lipidemia -acceptable as seen above off drug therapy. 3. PAF -interrogation of loop recorder reveals no episodes of atrial fibrillation. Continue Eliquis and Rythmol  4.   Pandemic response -not vaccinated

## 2022-07-01 PROCEDURE — G2066 INTER DEVC REMOTE 30D: HCPCS | Performed by: NURSE PRACTITIONER

## 2022-07-01 PROCEDURE — 93298 REM INTERROG DEV EVAL SCRMS: CPT | Performed by: NURSE PRACTITIONER

## 2022-07-05 DIAGNOSIS — R00.2 PALPITATIONS: ICD-10-CM

## 2022-07-05 DIAGNOSIS — I48.0 PAF (PAROXYSMAL ATRIAL FIBRILLATION) (HCC): ICD-10-CM

## 2022-07-05 DIAGNOSIS — I10 ESSENTIAL HYPERTENSION: ICD-10-CM

## 2022-07-05 DIAGNOSIS — Z95.818 STATUS POST PLACEMENT OF IMPLANTABLE LOOP RECORDER: Primary | ICD-10-CM

## 2022-07-21 DIAGNOSIS — R00.2 PALPITATIONS: ICD-10-CM

## 2022-07-21 DIAGNOSIS — Z95.818 STATUS POST PLACEMENT OF IMPLANTABLE LOOP RECORDER: Primary | ICD-10-CM

## 2022-08-02 DIAGNOSIS — R00.2 PALPITATIONS: ICD-10-CM

## 2022-08-02 DIAGNOSIS — Z95.818 STATUS POST PLACEMENT OF IMPLANTABLE LOOP RECORDER: Primary | ICD-10-CM

## 2022-08-03 PROCEDURE — 93298 REM INTERROG DEV EVAL SCRMS: CPT | Performed by: NURSE PRACTITIONER

## 2022-08-03 PROCEDURE — G2066 INTER DEVC REMOTE 30D: HCPCS | Performed by: NURSE PRACTITIONER

## 2022-08-04 DIAGNOSIS — R00.2 PALPITATIONS: ICD-10-CM

## 2022-08-04 DIAGNOSIS — I48.0 PAF (PAROXYSMAL ATRIAL FIBRILLATION) (HCC): ICD-10-CM

## 2022-08-04 DIAGNOSIS — Z95.818 STATUS POST PLACEMENT OF IMPLANTABLE LOOP RECORDER: Primary | ICD-10-CM

## 2022-10-03 DIAGNOSIS — Z95.818 STATUS POST PLACEMENT OF IMPLANTABLE LOOP RECORDER: Primary | ICD-10-CM

## 2022-10-03 DIAGNOSIS — R00.2 PALPITATIONS: ICD-10-CM

## 2022-10-03 DIAGNOSIS — I48.0 PAF (PAROXYSMAL ATRIAL FIBRILLATION) (HCC): ICD-10-CM

## 2022-10-03 PROCEDURE — 93298 REM INTERROG DEV EVAL SCRMS: CPT | Performed by: CLINICAL NURSE SPECIALIST

## 2022-10-03 PROCEDURE — G2066 INTER DEVC REMOTE 30D: HCPCS | Performed by: CLINICAL NURSE SPECIALIST

## 2022-11-01 ENCOUNTER — OFFICE VISIT (OUTPATIENT)
Dept: CARDIOLOGY CLINIC | Age: 67
End: 2022-11-01
Payer: MEDICARE

## 2022-11-01 VITALS
BODY MASS INDEX: 35.02 KG/M2 | DIASTOLIC BLOOD PRESSURE: 82 MMHG | HEART RATE: 62 BPM | WEIGHT: 217 LBS | SYSTOLIC BLOOD PRESSURE: 134 MMHG | OXYGEN SATURATION: 97 %

## 2022-11-01 DIAGNOSIS — Z79.01 CHRONIC ANTICOAGULATION: ICD-10-CM

## 2022-11-01 DIAGNOSIS — R00.2 PALPITATIONS: ICD-10-CM

## 2022-11-01 DIAGNOSIS — I48.0 PAF (PAROXYSMAL ATRIAL FIBRILLATION) (HCC): Primary | ICD-10-CM

## 2022-11-01 DIAGNOSIS — Z95.818 STATUS POST PLACEMENT OF IMPLANTABLE LOOP RECORDER: ICD-10-CM

## 2022-11-01 DIAGNOSIS — I10 ESSENTIAL HYPERTENSION: ICD-10-CM

## 2022-11-01 PROCEDURE — 1090F PRES/ABSN URINE INCON ASSESS: CPT | Performed by: NURSE PRACTITIONER

## 2022-11-01 PROCEDURE — 93291 INTERROG DEV EVAL SCRMS IP: CPT | Performed by: NURSE PRACTITIONER

## 2022-11-01 PROCEDURE — 3074F SYST BP LT 130 MM HG: CPT | Performed by: NURSE PRACTITIONER

## 2022-11-01 PROCEDURE — G8417 CALC BMI ABV UP PARAM F/U: HCPCS | Performed by: NURSE PRACTITIONER

## 2022-11-01 PROCEDURE — 3017F COLORECTAL CA SCREEN DOC REV: CPT | Performed by: NURSE PRACTITIONER

## 2022-11-01 PROCEDURE — 3078F DIAST BP <80 MM HG: CPT | Performed by: NURSE PRACTITIONER

## 2022-11-01 PROCEDURE — 1123F ACP DISCUSS/DSCN MKR DOCD: CPT | Performed by: NURSE PRACTITIONER

## 2022-11-01 PROCEDURE — G8400 PT W/DXA NO RESULTS DOC: HCPCS | Performed by: NURSE PRACTITIONER

## 2022-11-01 PROCEDURE — G8427 DOCREV CUR MEDS BY ELIG CLIN: HCPCS | Performed by: NURSE PRACTITIONER

## 2022-11-01 PROCEDURE — G8484 FLU IMMUNIZE NO ADMIN: HCPCS | Performed by: NURSE PRACTITIONER

## 2022-11-01 PROCEDURE — 99214 OFFICE O/P EST MOD 30 MIN: CPT | Performed by: NURSE PRACTITIONER

## 2022-11-01 PROCEDURE — 1036F TOBACCO NON-USER: CPT | Performed by: NURSE PRACTITIONER

## 2022-11-01 RX ORDER — CLONIDINE HYDROCHLORIDE 0.1 MG/1
0.1 TABLET ORAL 2 TIMES DAILY PRN
Qty: 30 TABLET | Refills: 3 | Status: SHIPPED | OUTPATIENT
Start: 2022-11-01

## 2022-11-01 NOTE — PROGRESS NOTES
[facility], [place]. [address], [facility] [address] [facility]  [phone] office  [phone] fax      OFFICE VISIT:  2022    Eulalio Hussein - : 1955  Reason For Visit:  Lara Horvath is a 75 y.o. female who is here for Follow-up (4 month follow up and loop recorder check. )    History:   Diagnosis Orders   1. PAF (paroxysmal atrial fibrillation) ([facility] [address]        2. Essential hypertension        3. Chronic anticoagulation      Eliquis      4. Palpitations        5. Status post placement of implantable loop recorder          The patient presents today for cardiology follow up and loop recorder check. The patient is doing well without cardiac related issues to report. She reports no bleeding issues on Eliquis. Review of BP log shows overall good control with elevation this AM at 160/90. The patient denies symptoms to suggest myocardial ischemia, heart failure or arrhythmia. The patient's PCP monitors cholesterol. Subjective  Emerita denies exertional chest pain, shortness of breath, orthopnea, paroxysmal nocturnal dyspnea, syncope, presyncope, edema and fatigue. The patient denies numbness or weakness to suggest cerebrovascular accident or transient ischemic attack.   + occasional palpitations.  ]  Emerita Orourke has the following history as recorded in [facility]:  Patient Active Problem List   Diagnosis Code    PAF (paroxysmal atrial fibrillation) ([facility]) I48.0    Abnormal stress echo R94.39    Precordial pain R07.2    Atrial fibrillation with RVR ([facility]) I48.91    Essential hypertension I10    Rhinorrhea J34.89    Chronic anticoagulation Z79.01    Acquired hypothyroidism E03.9    Hyperlipidemia E78.5    Hyperglycemia R73.9    Atrial fibrillation ([facility]) I48.91     Past Medical History:   Diagnosis Date    Atrial fibrillation ([address]     Disease of blood and blood forming organ     DVT in right leg     Hyperlipidemia 3/17/2015 Hypertension     Hyperthyroidism     Sleep apnea     Umbilical hernia      Past Surgical History:   Procedure Laterality Date    ABDOMEN SURGERY      CARDIAC CATHETERIZATION      COLONOSCOPY      CYSTOSCOPY      HYSTERECTOMY (CERVIX STATUS UNKNOWN)       Family History   Problem Relation Age of Onset    Heart Attack Mother     High Blood Pressure Father     Pacemaker Father     Diabetes Sister     High Blood Pressure Sister      Social History     Tobacco Use    Smoking status: Never    Smokeless tobacco: Never   Substance Use Topics    Alcohol use: No      Current Outpatient Medications   Medication Sig Dispense Refill    Ascorbic Acid (VITAMIN C) 250 MG tablet Take 250 mg by mouth three times daily       apixaban (ELIQUIS) 5 MG TABS tablet Take 1 tablet by mouth 2 times daily 60 tablet 5    olmesartan (BENICAR) 20 MG tablet Take 0.5 tablets by mouth 2 times daily (Patient taking differently: Take 20 mg by mouth daily If needed 10 mg taken at pm.) 90 tablet 3    cloNIDine (CATAPRES) 0.1 MG tablet Take 1 tablet by mouth 2 times daily as needed for High Blood Pressure (for blood pressure > 180/100) 30 tablet 3    propafenone (RYTHMOL) 150 MG tablet Take 1 tablet by mouth every 8 hours 90 tablet 3    Zinc Sulfate 220 (50 Zn) MG TABS Take 50 mg by mouth daily       Cholecalciferol (VITAMIN D3 PO) Take 1,000 mg by mouth daily      calcium carbonate (OSCAL) 500 MG TABS tablet Take 500 mg by mouth daily      Biotin 1 MG CAPS Take by mouth      spironolactone (ALDACTONE) 25 MG tablet Take 25 mg by mouth daily      Omega-3 Fatty Acids (FISH OIL) 1000 MG CAPS Take 1,000 mg by mouth 2 times daily       levothyroxine (SYNTHROID) 125 MCG tablet Take 125 mcg by mouth Daily       No current facility-administered medications for this visit. Allergies: Patient has no known allergies. Review of Systems  Constitutional - no appetite change, or unexpected weight change. No fever, chills or diaphoresis.   No significant change in activity level or new onset of fatigue. HEENT - no significant rhinorrhea or epistaxis. No tinnitus or significant hearing loss. Eyes - no sudden vision change or amaurosis. No corneal arcus, xantholasma, subconjunctival hemorrhage or discharge. Respiratory - no significant wheezing, stridor, apnea or cough. No dyspnea on exertion or shortness of air. Cardiovascular - no exertional chest pain to suggest myocardial ischemia. No orthopnea or PND. No sensation of sustained arrythmia. No occurrence of slow heart rate. No claudication. + occasional palpitations. Gastrointestinal - no abdominal swelling or pain. No blood in stool. No severe constipation, diarrhea, nausea, or vomiting. Genitourinary - no dysuria, frequency, or urgency. No flank pain or hematuria. Musculoskeletal - no back pain or myalgia. No problems with gait. Extremities - no clubbing, cyanosis or extremity edema. Skin - no color change or rash. No pallor. No new surgical incision. Neurologic - no speech difficulty, facial asymmetry or lateralizing weakness. No seizures, presyncope or syncope. No significant dizziness. Hematologic - no easy bruising or excessive bleeding. Psychiatric - no severe anxiety or insomnia. No confusion. All other review of systems are negative. Objective  Vital Signs - /82   Pulse 62   Wt 217 lb (98.4 kg)   SpO2 97%   BMI 35.02 kg/m²   General - Emerita is alert, cooperative, and pleasant. Well groomed. No acute distress. Body habitus - Body mass index is 35.02 kg/m². HEENT - Head is normocephalic. No circumoral cyanosis. Dentition is normal.  EYES -   Lids normal without ptosis. No discharge, edema or subconjunctival hemorrhage. Neck - Symmetrical without apparent mass or lymphadenopathy. Respiratory - Normal respiratory effort without use of accessory muscles. Ausculatation reveals vesicular breath sounds without crackles, wheezes, rub or rhonchi.     Cardiovascular - No jugular venous distention. Auscultation reveals regular rate and rhythm. No audible clicks, gallop or rub. No murmur. No lower extremity varicosities. No carotid bruits. Abdominal -  No visible distention, mass or pulsations. Extremities - No clubbing or cyanosis. No statis dermatitis or ulcers. No edema. Musculoskeletal -   No Osler's nodes. No kyphosis or scoliosis. Gait is even and regular without limp or shuffle. Ambulates without assistance. Skin -  Warm and dry; no rash or pallor. No new surgical wound. Neurological - No focal neurological deficits. Thought processes coherent. No apparent tremor. Oriented to person, place and time. Psychiatric -  Appropriate affect and mood. Data reviewed:  9/18/21 echo   Normal size left atrium. Normal left ventricular size with preserved LV function and an estimated   ejection fraction of approximately 55-60%. No evidence of left ventricular mass or thrombus noted. Normal right atrial dimension with no evidence of thrombus or mass noted. Normal right ventricular size with preserved RV function. Structurally normal mitral valve with normal leaflet mobility. No evidence   of mitral valve stenosis or mild mitral regurgitation. Aortic valve appears to be tricuspid. Structurally normal aortic valve. No significant aortic regurgitation or stenosis is noted. Tricuspid valve is structurally normal.   No evidence of tricuspid regurgitation. Electronically signed by Alison Torres MD(Interpreting   physician) on 09/19/2021 02:11 PM    3/18/18 echo  1. Moderate concentric LVH   2. Normal LV systolic function (estimated LV EF 60-65%)   3. Grade I diastolic dysfunction (impaired relaxation) with normal left   atrial pressure   4. Mild mitral regurgitation   5.  Mild tricuspid regurgitation; estimated RVSP of at least 31 mmHg   Electronically signed by Tanika Baumann MD(Interpreting   physician) on 03/18/2018 04:51 PM    Lab Results Component Value Date    WBC 6.2 09/25/2021    HGB 12.9 09/25/2021    HCT 40.1 09/25/2021    MCV 97.3 09/25/2021     09/25/2021     Lab Results   Component Value Date     09/25/2021    K 4.2 09/25/2021     09/25/2021    CO2 24 09/25/2021    BUN 16 09/25/2021    CREATININE 0.5 09/25/2021    GLUCOSE 117 (H) 09/25/2021    CALCIUM 9.7 09/25/2021    PROT 6.8 09/25/2021    LABALBU 4.1 09/25/2021    BILITOT 0.4 09/25/2021    ALKPHOS 112 (H) 09/25/2021    AST 19 09/25/2021    ALT 24 09/25/2021    LABGLOM >60 09/25/2021    GFRAA >59 09/25/2021       Lab Results   Component Value Date    CHOL 163 09/19/2021     Lab Results   Component Value Date    TRIG 181 (H) 09/19/2021     Lab Results   Component Value Date    HDL 56 (L) 09/19/2021     Lab Results   Component Value Date    LDLCALC 71 09/19/2021     BP Readings from Last 3 Encounters:   11/01/22 134/82   06/28/22 (!) 158/82   02/22/22 138/78    Pulse Readings from Last 3 Encounters:   11/01/22 62   06/28/22 72   02/22/22 71        Wt Readings from Last 3 Encounters:   11/01/22 217 lb (98.4 kg)   06/28/22 215 lb (97.5 kg)   02/22/22 211 lb (95.7 kg)     Assessment/Plan:   Diagnosis Orders   1. PAF (paroxysmal atrial fibrillation) ([address]        2. Essential hypertension        3. Chronic anticoagulation      Eliquis      4. Palpitations        5. Status post placement of implantable loop recorder          [id number] Score: 3  Disclaimer: Risk Score calculation is dependent on accuracy of patient problem list and past encounter diagnosis. Loop recorder check:  No tachy, AT or AF episodes. (2) symptoms one of which accidentally transmitted. No pauses. 1 dior at 6:28 AM duration 16 seconds at 38 bpm.  Patient was sleeping. Stable CV status without overt heart failure, sensed arrhythmia or angina. PAF - continues on Rythmol and Eliquis. 0% AF burden on loop recorder interrogation. No bleeding issue on Eliquis.     HTN - normotensive on current regimen. BP today 134/82. Continue same. Palpitations - PVC - 1%    Patient is compliant with medication regimen. Previous cardiac history and records reviewed. Continue current medical management for cardiac related condition. Continue other current medications as directed. Continue to follow up with primary care provider for non cardiac medical problems. If your [place] care provider is outside of [place] [facility] please request that your labs be faxed to this office at [phone]. BP goal 130/80 or less. Call the office with any problems, questions or concerns at [phone]. Cardiology follow up as scheduled in [address] appointments. Educational included in patient instructions. Heart health.       Lisa Jackson, APRN

## 2022-11-01 NOTE — PATIENT INSTRUCTIONS
New instructions for today:  Eliquis can increase your risk of bleeding. If you notice blood in urine or stool, bleeding gums, excessive bruising or cough productive of bloody sputum, notify the office. Information on this blood thinner has been included in your after visit summary. Patient Instructions:  Continue current medications as prescribed. Always keep a current medication list. Bring your medications to every office visit. Continue to follow up with primary care provider for non cardiac medical problems. Call the office with any problems, questions or concerns at [phone]. If you have been asked to keep a blood pressure log, do so for 2 weeks. Call the office to report readings to the triage nurse at [phone]. Follow up with cardiologist as scheduled. The following educational material has been included in this after visit summary for your review: Life simple 7. Heart health. Life simple 7  1) Manage blood pressure - high blood pressure is a major risk factor for heart disease and stroke. Keeping blood pressure in health range reduces strain on your heart, arteries and kidneys. Blood pressure goal is less than 130/80. 2) Control cholesterol - contributes to plaque, which can clog arteries and lead to heart disease and stroke. When you control your cholesterol you are giving your arteries their best chance to remain clear. It is recommended that you get cholesterol lab work done once a year. 3) Reduce blood sugar - most of the food we eat is turning into glucose or blood sugar that our body uses for energy. Over time, high levels of blood sugar can damage your heart, kidneys, eyes and nerves. 4) Get active - living an active life is one of the most rewarding gifts you can give yourself and those you love. Simply put, daily physical activity increases your length and quality of life. Strive to exercise 15 minutes most days of the week.   5)  Eat better - A healthy diet is one of your best weapons for fighting cardiovascular disease. When you eat a heart healthy diet, you improve your chances for feeling good and staying healthy for life. 6)  Lose weight - when you shed extra fat an unnecessary pounds, you reduce the burden on your hear, lungs, blood vessels and skeleton. You give yourself the gift of active living, you lower your blood pressure and help yourself feel better. 7) Stop smoking - cigarette smokers have a higher risk of developing cardiovascular disease. If  You smoke, quitting is the best thing you can do for your health. Check American Heart Association on line for more information on Life's Simple 7 and tips for healthy living. A Healthy Heart: Care Instructions  Your Care Instructions     Coronary artery disease, also called heart disease, occurs when a substance called plaque builds up in the vessels that supply oxygen-rich blood to your heart muscle. This can narrow the blood vessels and reduce blood flow. A heart attack happens when blood flow is completely blocked. A high-fat diet, smoking, and other factors increase the risk of heart disease. Your doctor has found that you have a chance of having heart disease. You can do lots of things to keep your heart healthy. It may not be easy, but you can change your diet, exercise more, and quit smoking. These steps really work to lower your chance of heart disease. Follow-up care is a key part of your treatment and safety. Be sure to make and go to all appointments, and call your doctor if you are having problems. It's also a good idea to know your test results and keep a list of the medicines you take. How can you care for yourself at home? Diet  Use less salt when you cook and eat. This helps lower your blood pressure. Taste food before salting. Add only a little salt when you think you need it. With time, your taste buds will adjust to less salt.   Eat fewer snack items, fast foods, canned soups, and other high-salt, high-fat, processed foods. Read food labels and try to avoid saturated and trans fats. They increase your risk of heart disease by raising cholesterol levels. Limit the amount of solid fat-butter, margarine, and shortening-you eat. Use olive, peanut, or canola oil when you cook. Bake, broil, and steam foods instead of frying them. Eat a variety of fruit and vegetables every day. Dark green, deep orange, red, or yellow fruits and vegetables are especially good for you. Examples include spinach, carrots, peaches, and berries. Foods high in fiber can reduce your cholesterol and provide important vitamins and minerals. High-fiber foods include whole-grain cereals and breads, oatmeal, beans, brown rice, citrus fruits, and apples. Eat lean proteins. Heart-healthy proteins include seafood, lean meats and poultry, eggs, beans, peas, nuts, seeds, and soy products. Limit drinks and foods with added sugar. These include candy, desserts, and soda pop. Lifestyle changes  If your doctor recommends it, get more exercise. Walking is a good choice. Bit by bit, increase the amount you walk every day. Try for at least 30 minutes on most days of the week. You also may want to swim, bike, or do other activities. Do not smoke. If you need help quitting, talk to your doctor about stop-smoking programs and medicines. These can increase your chances of quitting for good. Quitting smoking may be the most important step you can take to protect your heart. It is never too late to quit. Limit alcohol to 2 drinks a day for men and 1 drink a day for women. Too much alcohol can cause health problems. Manage other health problems such as diabetes, high blood pressure, and high cholesterol. If you think you may have a problem with alcohol or drug use, talk to your doctor. Medicines  Take your medicines exactly as prescribed. Call your doctor if you think you are having a problem with your medicine.   If your doctor recommends aspirin, take the amount directed each day. Make sure you take aspirin and not another kind of pain reliever, such as acetaminophen (Tylenol). When should you call for help? [id number] if you have symptoms of a heart attack. These may include:  Chest pain or pressure, or a strange feeling in the chest.  Sweating. Shortness of breath. Pain, pressure, or a strange feeling in the back, neck, jaw, or upper belly or in one or both shoulders or arms. Lightheadedness or sudden weakness. A fast or irregular heartbeat. After you call 911, the  may tell you to chew 1 adult-strength or 2 to 4 low-dose aspirin. Wait for an ambulance. Do not try to drive yourself. Watch closely for changes in your health, and be sure to contact your doctor if you have any problems. Where can you learn more? Go to https://[employer].eMeter. org and sign in to your [employer] account. Enter P131 in the [employer] box to learn more about \"A Healthy Heart: Care Instructions. \"     If you do not have an account, please click on the \"Sign Up Now\" link. Current as of: December 16, 2019               Content Version: 12.5  © 2886-4449 [employer], [employer]. Care instructions adapted under license by [facility] ([facility]). If you have questions about a medical condition or this instruction, always ask your healthcare professional. [place] [address] any warranty or liability for your use of this information.

## 2022-11-03 DIAGNOSIS — R00.2 PALPITATIONS: ICD-10-CM

## 2022-11-03 DIAGNOSIS — Z95.818 STATUS POST PLACEMENT OF IMPLANTABLE LOOP RECORDER: Primary | ICD-10-CM

## 2022-11-22 DIAGNOSIS — R00.2 PALPITATIONS: ICD-10-CM

## 2022-11-22 DIAGNOSIS — Z95.818 STATUS POST PLACEMENT OF IMPLANTABLE LOOP RECORDER: Primary | ICD-10-CM

## 2022-11-22 DIAGNOSIS — I48.0 PAF (PAROXYSMAL ATRIAL FIBRILLATION) (HCC): ICD-10-CM

## 2022-11-29 DIAGNOSIS — Z95.818 STATUS POST PLACEMENT OF IMPLANTABLE LOOP RECORDER: Primary | ICD-10-CM

## 2022-11-29 DIAGNOSIS — R00.2 PALPITATIONS: ICD-10-CM

## 2022-11-29 DIAGNOSIS — I48.0 PAF (PAROXYSMAL ATRIAL FIBRILLATION) (HCC): ICD-10-CM

## 2022-12-05 DIAGNOSIS — R00.2 PALPITATIONS: ICD-10-CM

## 2022-12-05 DIAGNOSIS — Z95.818 STATUS POST PLACEMENT OF IMPLANTABLE LOOP RECORDER: Primary | ICD-10-CM

## 2022-12-05 DIAGNOSIS — I48.0 PAF (PAROXYSMAL ATRIAL FIBRILLATION) (HCC): ICD-10-CM

## 2022-12-05 PROCEDURE — 93298 REM INTERROG DEV EVAL SCRMS: CPT | Performed by: NURSE PRACTITIONER

## 2022-12-05 PROCEDURE — G2066 INTER DEVC REMOTE 30D: HCPCS | Performed by: NURSE PRACTITIONER

## 2022-12-29 DIAGNOSIS — R00.2 PALPITATIONS: ICD-10-CM

## 2022-12-29 DIAGNOSIS — Z95.818 STATUS POST PLACEMENT OF IMPLANTABLE LOOP RECORDER: Primary | ICD-10-CM

## 2022-12-29 DIAGNOSIS — I48.0 PAF (PAROXYSMAL ATRIAL FIBRILLATION) (HCC): ICD-10-CM

## 2023-01-09 DIAGNOSIS — R00.2 PALPITATIONS: ICD-10-CM

## 2023-01-09 DIAGNOSIS — I48.0 PAF (PAROXYSMAL ATRIAL FIBRILLATION) (HCC): ICD-10-CM

## 2023-01-09 DIAGNOSIS — Z95.818 STATUS POST PLACEMENT OF IMPLANTABLE LOOP RECORDER: Primary | ICD-10-CM

## 2023-02-09 DIAGNOSIS — R00.2 PALPITATIONS: ICD-10-CM

## 2023-02-09 DIAGNOSIS — Z95.818 STATUS POST PLACEMENT OF IMPLANTABLE LOOP RECORDER: Primary | ICD-10-CM

## 2023-02-09 DIAGNOSIS — I48.0 PAF (PAROXYSMAL ATRIAL FIBRILLATION) (HCC): ICD-10-CM

## 2023-02-13 ENCOUNTER — HOSPITAL ENCOUNTER (EMERGENCY)
Age: 68
Discharge: HOME | End: 2023-02-13
Attending: EMERGENCY MEDICINE
Payer: MEDICARE

## 2023-02-13 ENCOUNTER — APPOINTMENT (OUTPATIENT)
Dept: GENERAL RADIOLOGY | Age: 68
End: 2023-02-13
Payer: MEDICARE

## 2023-02-13 VITALS
WEIGHT: 210 LBS | TEMPERATURE: 99 F | HEIGHT: 65 IN | RESPIRATION RATE: 18 BRPM | HEART RATE: 61 BPM | BODY MASS INDEX: 34.99 KG/M2 | OXYGEN SATURATION: 97 % | DIASTOLIC BLOOD PRESSURE: 80 MMHG | SYSTOLIC BLOOD PRESSURE: 147 MMHG

## 2023-02-13 DIAGNOSIS — I48.0 PAF (PAROXYSMAL ATRIAL FIBRILLATION) (HCC): ICD-10-CM

## 2023-02-13 DIAGNOSIS — R07.89 ATYPICAL CHEST PAIN: Primary | ICD-10-CM

## 2023-02-13 DIAGNOSIS — Z95.818 STATUS POST PLACEMENT OF IMPLANTABLE LOOP RECORDER: Primary | ICD-10-CM

## 2023-02-13 DIAGNOSIS — Z86.79 HISTORY OF ATRIAL FIBRILLATION: ICD-10-CM

## 2023-02-13 DIAGNOSIS — Z79.01 ON CONTINUOUS ORAL ANTICOAGULATION: ICD-10-CM

## 2023-02-13 DIAGNOSIS — R00.2 PALPITATIONS: ICD-10-CM

## 2023-02-13 LAB
ALBUMIN SERPL-MCNC: 4.4 G/DL (ref 3.5–5.2)
ALP BLD-CCNC: 80 U/L (ref 35–104)
ALT SERPL-CCNC: 16 U/L (ref 5–33)
ANION GAP SERPL CALCULATED.3IONS-SCNC: 9 MMOL/L (ref 7–19)
AST SERPL-CCNC: 19 U/L (ref 5–32)
BASOPHILS ABSOLUTE: 0 K/UL (ref 0–0.2)
BASOPHILS RELATIVE PERCENT: 0.6 % (ref 0–1)
BILIRUB SERPL-MCNC: 0.4 MG/DL (ref 0.2–1.2)
BUN BLDV-MCNC: 17 MG/DL (ref 8–23)
CALCIUM SERPL-MCNC: 9.7 MG/DL (ref 8.8–10.2)
CHLORIDE BLD-SCNC: 103 MMOL/L (ref 98–111)
CO2: 26 MMOL/L (ref 22–29)
CREAT SERPL-MCNC: 0.8 MG/DL (ref 0.5–0.9)
EKG P AXIS: 77 DEGREES
EKG P-R INTERVAL: 148 MS
EKG Q-T INTERVAL: 402 MS
EKG QRS DURATION: 134 MS
EKG QTC CALCULATION (BAZETT): 412 MS
EKG T AXIS: 105 DEGREES
EOSINOPHILS ABSOLUTE: 0.4 K/UL (ref 0–0.6)
EOSINOPHILS RELATIVE PERCENT: 5.1 % (ref 0–5)
GFR SERPL CREATININE-BSD FRML MDRD: >60 ML/MIN/{1.73_M2}
GLUCOSE BLD-MCNC: 119 MG/DL (ref 74–109)
HCT VFR BLD CALC: 41.7 % (ref 37–47)
HEMOGLOBIN: 13.8 G/DL (ref 12–16)
IMMATURE GRANULOCYTES #: 0 K/UL
LIPASE: 40 U/L (ref 13–60)
LYMPHOCYTES ABSOLUTE: 1.8 K/UL (ref 1.1–4.5)
LYMPHOCYTES RELATIVE PERCENT: 26.9 % (ref 20–40)
MCH RBC QN AUTO: 31.9 PG (ref 27–31)
MCHC RBC AUTO-ENTMCNC: 33.1 G/DL (ref 33–37)
MCV RBC AUTO: 96.5 FL (ref 81–99)
MONOCYTES ABSOLUTE: 0.8 K/UL (ref 0–0.9)
MONOCYTES RELATIVE PERCENT: 11.9 % (ref 0–10)
NEUTROPHILS ABSOLUTE: 3.8 K/UL (ref 1.5–7.5)
NEUTROPHILS RELATIVE PERCENT: 55.2 % (ref 50–65)
PDW BLD-RTO: 12.8 % (ref 11.5–14.5)
PLATELET # BLD: 184 K/UL (ref 130–400)
PMV BLD AUTO: 10.2 FL (ref 9.4–12.3)
POTASSIUM SERPL-SCNC: 4 MMOL/L (ref 3.5–5)
PRO-BNP: 59 PG/ML (ref 0–900)
RBC # BLD: 4.32 M/UL (ref 4.2–5.4)
SODIUM BLD-SCNC: 138 MMOL/L (ref 136–145)
TOTAL PROTEIN: 6.8 G/DL (ref 6.6–8.7)
TROPONIN: <0.01 NG/ML (ref 0–0.03)
WBC # BLD: 6.8 K/UL (ref 4.8–10.8)

## 2023-02-13 PROCEDURE — 6370000000 HC RX 637 (ALT 250 FOR IP): Performed by: EMERGENCY MEDICINE

## 2023-02-13 PROCEDURE — 84484 ASSAY OF TROPONIN QUANT: CPT

## 2023-02-13 PROCEDURE — 83690 ASSAY OF LIPASE: CPT

## 2023-02-13 PROCEDURE — 99285 EMERGENCY DEPT VISIT HI MDM: CPT

## 2023-02-13 PROCEDURE — 71045 X-RAY EXAM CHEST 1 VIEW: CPT

## 2023-02-13 PROCEDURE — 36415 COLL VENOUS BLD VENIPUNCTURE: CPT

## 2023-02-13 PROCEDURE — 80053 COMPREHEN METABOLIC PANEL: CPT

## 2023-02-13 PROCEDURE — 93005 ELECTROCARDIOGRAM TRACING: CPT | Performed by: EMERGENCY MEDICINE

## 2023-02-13 PROCEDURE — 85025 COMPLETE CBC W/AUTO DIFF WBC: CPT

## 2023-02-13 PROCEDURE — 83880 ASSAY OF NATRIURETIC PEPTIDE: CPT

## 2023-02-13 PROCEDURE — 93010 ELECTROCARDIOGRAM REPORT: CPT | Performed by: INTERNAL MEDICINE

## 2023-02-13 RX ORDER — FAMOTIDINE 20 MG/1
20 TABLET, FILM COATED ORAL 2 TIMES DAILY
Qty: 60 TABLET | Refills: 0 | Status: SHIPPED | OUTPATIENT
Start: 2023-02-13

## 2023-02-13 RX ADMIN — LIDOCAINE HYDROCHLORIDE: 20 SOLUTION ORAL; TOPICAL at 06:16

## 2023-02-13 ASSESSMENT — PAIN DESCRIPTION - LOCATION: LOCATION: CHEST

## 2023-02-13 ASSESSMENT — PAIN - FUNCTIONAL ASSESSMENT: PAIN_FUNCTIONAL_ASSESSMENT: 0-10

## 2023-02-13 ASSESSMENT — PAIN SCALES - GENERAL: PAINLEVEL_OUTOF10: 2

## 2023-02-13 ASSESSMENT — ENCOUNTER SYMPTOMS
GASTROINTESTINAL NEGATIVE: 1
EYES NEGATIVE: 1
RESPIRATORY NEGATIVE: 1

## 2023-02-13 ASSESSMENT — PAIN DESCRIPTION - ORIENTATION: ORIENTATION: MID

## 2023-02-13 ASSESSMENT — PAIN DESCRIPTION - DESCRIPTORS: DESCRIPTORS: PRESSURE

## 2023-02-13 NOTE — ED PROVIDER NOTES
[facility] EMERGENCY DEPT  EMERGENCY DEPARTMENT ENCOUNTER      Pt Name: Tito Hunter  MRN: [id number]  [place] 1955  Date of evaluation: 2/13/2023  Provider: Blake Jeffries MD    CHIEF COMPLAINT       Chief Complaint   Patient presents with    Chest Pain     Mid sternal chest pain began about 1900 last night . Patient reports it feels like it is \"in behind\" the mid chest area           HISTORY OF PRESENT ILLNESS   (Location/Symptom, Timing/Onset,Context/Setting, Quality, Duration, Modifying Factors, Severity)  Note limiting factors. Tito Hunter is a 75 y.o. female who presents to the emergency department with complaint of chest pain. Started around 7pm last night and waxed and waned overnight. Pain was pressure-like. Seemed to get better after belching. No particular trigger to onset of pain. Pain improved if she got up and walked around and belched. No associated abdominal pain. Pain radiated to between shoulder blades. No n/v, soa, or other sxs. Took some pepcid at home which seemed to help relieve pain some. Has h/o Afib. No h/o CAD. Last heart cath was 2017. Still on eliquis. Has loop recorder in place. By time of evaluation here, symptoms have improved significantly. Says there is still some discomfort but not nearly as bad as what it was earlier tonight. HPI    NursingNotes were reviewed. REVIEW OF SYSTEMS    (2-9 systems for level 4, 10 or more for level 5)     Review of Systems   Constitutional: Negative. HENT: Negative. Eyes: Negative. Respiratory: Negative. Cardiovascular:  Positive for chest pain. Gastrointestinal: Negative. Genitourinary: Negative. Musculoskeletal: Negative. Skin: Negative. Neurological: Negative. Hematological: Negative. Psychiatric/Behavioral: Negative. A complete review of systems was performed and is negative except as noted above in the HPI.        PAST MEDICAL HISTORY     Past Medical History:   Diagnosis Date Atrial fibrillation ([facility] [address]     Disease of blood and blood forming organ     DVT in right leg     Hyperlipidemia 3/17/2015    Hypertension     Hyperthyroidism     Sleep apnea     Umbilical hernia          SURGICAL HISTORY       Past Surgical History:   Procedure Laterality Date    ABDOMEN SURGERY      CARDIAC CATHETERIZATION      COLONOSCOPY      CYSTOSCOPY      HYSTERECTOMY (CERVIX STATUS UNKNOWN)           CURRENT MEDICATIONS       Previous Medications    APIXABAN (ELIQUIS) 5 MG TABS TABLET    Take 1 tablet by mouth 2 times daily    ASCORBIC ACID (VITAMIN C) 250 MG TABLET    Take 250 mg by mouth three times daily     CALCIUM CARBONATE (OSCAL) 500 MG TABS TABLET    Take 500 mg by mouth daily    CHOLECALCIFEROL (VITAMIN D3 PO)    Take 1,000 mg by mouth daily    CLONIDINE (CATAPRES) 0.1 MG TABLET    Take 1 tablet by mouth 2 times daily as needed for High Blood Pressure (for blood pressure > 180/100)    LEVOTHYROXINE (SYNTHROID) 125 MCG TABLET    Take 125 mcg by mouth Daily    OLMESARTAN (BENICAR) 20 MG TABLET    Take 0.5 tablets by mouth 2 times daily    OMEGA-3 FATTY ACIDS (FISH OIL) 1000 MG CAPS    Take 1,000 mg by mouth 2 times daily     PROPAFENONE (RYTHMOL) 150 MG TABLET    Take 1 tablet by mouth every 8 hours    SPIRONOLACTONE (ALDACTONE) 25 MG TABLET    Take 25 mg by mouth daily    ZINC SULFATE 220 (50 ZN) MG TABS    Take 50 mg by mouth daily        ALLERGIES     Patient has no known allergies.     FAMILY HISTORY       Family History   Problem Relation Age of Onset    Heart Attack Mother     High Blood Pressure Father     Pacemaker Father     Diabetes Sister     High Blood Pressure Sister           SOCIAL HISTORY       Social History     Socioeconomic History    Marital status:      Spouse name: None    Number of children: None    Years of education: None    Highest education level: None   Tobacco Use    Smoking status: Never    Smokeless tobacco: Never   Vaping Use    Vaping Use: Never used   Substance and Sexual Activity    Alcohol use: No    Drug use: No       SCREENINGS    [place] Coma Scale  Eye Opening: Spontaneous  Best Verbal Response: Oriented  Best Motor Response: Obeys commands  [place] Coma Scale Score: 15        PHYSICAL EXAM    (up to 7 for level 4, 8 or more for level 5)     ED Triage Vitals [02/13/23 0452]   BP Temp Temp Source Heart Rate Resp SpO2 Height Weight   (!) 178/85 99 °F (37.2 °C) Oral 60 17 95 % 5' 5\" (1.651 m) 210 lb (95.3 kg)       Physical Exam  Vitals and nursing note reviewed. Constitutional:       General: She is not in acute distress. Appearance: She is well-developed. She is not diaphoretic. HENT:      Head: Normocephalic and atraumatic. Eyes:      General: No scleral icterus. Neck:      Vascular: No JVD. Cardiovascular:      Rate and Rhythm: Normal rate and regular rhythm. Pulses:           Radial pulses are 2+ on the right side and 2+ on the left side. Dorsalis pedis pulses are 2+ on the right side and 2+ on the left side. Heart sounds: Normal heart sounds. No murmur heard. No friction rub. No gallop. Pulmonary:      Effort: Pulmonary effort is normal. No accessory muscle usage or respiratory distress. Breath sounds: Normal breath sounds. No stridor. No decreased breath sounds, wheezing, rhonchi or rales. Chest:      Chest wall: No tenderness. Abdominal:      General: There is no distension. Palpations: Abdomen is soft. Tenderness: There is no abdominal tenderness. There is no guarding or rebound. Musculoskeletal:         General: No deformity. Normal range of motion. Right lower leg: No edema. Left lower leg: No edema. Skin:     General: Skin is warm and dry. Coloration: Skin is not pale. Findings: No erythema. Neurological:      Mental Status: She is alert and oriented to person, place, and time. GCS: GCS eye subscore is 4. GCS verbal subscore is 5. GCS motor subscore is 6.       Cranial Nerves: No cranial nerve deficit. Motor: No abnormal muscle tone. Coordination: Coordination normal.   Psychiatric:         Behavior: Behavior normal.         Judgment: Judgment normal.       DIAGNOSTIC RESULTS     EKG: All EKG's are interpreted by the Emergency Department Physician who either signs or Co-signs this chart in the absence of a cardiologist.        RADIOLOGY:   Non-plain film images such as CT, Ultrasound and MRI are read by the radiologist. Plainradiographic images are visualized and preliminarily interpreted by the emergency physician with the below findings:        Interpretation per the Radiologist below, if available at the time of this note:    XR CHEST PORTABLE    (Results Pending)         ED BEDSIDE ULTRASOUND:   Performed by ED Physician - none    LABS:  Labs Reviewed   CBC WITH AUTO DIFFERENTIAL - Abnormal; Notable for the following components:       Result Value    MCH 31.9 (*)     Monocytes % 11.9 (*)     Eosinophils % 5.1 (*)     All other components within normal limits   COMPREHENSIVE METABOLIC PANEL - Abnormal; Notable for the following components:    Glucose 119 (*)     All other components within normal limits   TROPONIN   BRAIN NATRIURETIC PEPTIDE   LIPASE       All other labs were within normal range or not returned as of this dictation. EMERGENCY DEPARTMENT COURSE and DIFFERENTIALDIAGNOSIS/MDM:   Vitals:    Vitals:    02/13/23 0452 02/13/23 0530   BP: (!) 178/85 (!) 147/80   Pulse: 60 61   Resp: 17 18   Temp: 99 °F (37.2 °C)    TempSrc: Oral    SpO2: 95% 97%   Weight: 210 lb (95.3 kg)    Height: 5' 5\" (1.651 m)        MDM    ED Course as of 02/13/23 0639   Mon Feb 13, 2023   0459 Ekg shows sinus rhythm with a rate of 67. LBBB present. , Qtc 428. Unchanged compared to prior [STEFANIE]   0556 By description, symptoms seem to be more GI related from reflux or indigestion. No exertional component, and in fact pain improves with ambulation.   Thus far no signs of cardiac ischemia. Heart score 4 [STEFANIE]   0619 CBC and chemistry overall unremarkable. Chest x-ray shows no pneumothorax, widened mediastinum, edema, focal consolidation, or other acute concerning findings [STEFANIE]   0408 Did seem to have some improvement after GI cocktail but says it was difficult to tell because symptoms had mostly resolved by that point anyway. Patient agreeable for discharge with outpatient follow-up with PCP and/or cardiology. Agreeable to return precautions [STEFANIE]      ED Course User Index  [STEFANIE] Pat Allen MD     Evaluation and work-up here revealed no signs of emergent or life-threatening pathology that would necessitate admission for further work-up or management at this time. Patient is felt to be stable for discharge home with return precautions for worsening of the condition or development of new concerning symptoms. Patient was encouraged to follow-up with their primary care doctor in the appropriate timeframe. Necessary prescriptions and information have been provided for treatment at home. Patient voices understanding and agreement with the plan. CONSULTS:  None    PROCEDURES:  Unless otherwise notedbelow, none     Procedures    FINAL IMPRESSION     1. Atypical chest pain    2. History of atrial fibrillation    3. On continuous oral anticoagulation          DISPOSITION/PLAN   DISPOSITION Decision To Discharge 02/13/2023 06:17:42 AM      No notes of EC Admission Criteria type on file.     PATIENT REFERRED TO:  [facility] EMERGENCY DEPT  Garo rafiagavin  [phone]    If symptoms worsen    Shelton Coronado MD  [facility] [phone]      As needed    Leonor Campa, [address], [facility]    Schedule an appointment as soon as possible for a visit       DISCHARGE MEDICATIONS:  New Prescriptions    FAMOTIDINE (PEPCID) 20 MG TABLET    Take 1 tablet by mouth 2 times daily          (Please note that portions of this note were completed with a voice recognition program.  Efforts were made to edit the dictations butoccasionally words are mis-transcribed.)    Rosio Sargent MD (electronically signed)  Emergency Physician         Rosio Cárdenas MD  02/13/23 2067

## 2023-02-13 NOTE — ED NOTES
Patient placed on cardiac monitor, continuous pulse oximeter, and NIBP monitor. Monitor alarms on.         Zeina Arzate RN  02/13/23 7712

## 2023-03-13 DIAGNOSIS — I48.0 PAF (PAROXYSMAL ATRIAL FIBRILLATION) (HCC): ICD-10-CM

## 2023-03-13 DIAGNOSIS — Z95.818 STATUS POST PLACEMENT OF IMPLANTABLE LOOP RECORDER: Primary | ICD-10-CM

## 2023-03-13 DIAGNOSIS — I10 ESSENTIAL HYPERTENSION: ICD-10-CM

## 2023-03-14 ENCOUNTER — OFFICE VISIT (OUTPATIENT)
Dept: CARDIOLOGY CLINIC | Age: 68
End: 2023-03-14
Payer: MEDICARE

## 2023-03-14 VITALS
WEIGHT: 207 LBS | HEART RATE: 61 BPM | DIASTOLIC BLOOD PRESSURE: 72 MMHG | SYSTOLIC BLOOD PRESSURE: 112 MMHG | BODY MASS INDEX: 34.49 KG/M2 | HEIGHT: 65 IN

## 2023-03-14 DIAGNOSIS — I48.0 PAF (PAROXYSMAL ATRIAL FIBRILLATION) (HCC): Primary | ICD-10-CM

## 2023-03-14 DIAGNOSIS — Z45.09 ENCOUNTER FOR LOOP RECORDER CHECK: ICD-10-CM

## 2023-03-14 PROCEDURE — G8417 CALC BMI ABV UP PARAM F/U: HCPCS | Performed by: INTERNAL MEDICINE

## 2023-03-14 PROCEDURE — 1090F PRES/ABSN URINE INCON ASSESS: CPT | Performed by: INTERNAL MEDICINE

## 2023-03-14 PROCEDURE — 3078F DIAST BP <80 MM HG: CPT | Performed by: INTERNAL MEDICINE

## 2023-03-14 PROCEDURE — 99213 OFFICE O/P EST LOW 20 MIN: CPT | Performed by: INTERNAL MEDICINE

## 2023-03-14 PROCEDURE — 3074F SYST BP LT 130 MM HG: CPT | Performed by: INTERNAL MEDICINE

## 2023-03-14 PROCEDURE — 1036F TOBACCO NON-USER: CPT | Performed by: INTERNAL MEDICINE

## 2023-03-14 PROCEDURE — 1123F ACP DISCUSS/DSCN MKR DOCD: CPT | Performed by: INTERNAL MEDICINE

## 2023-03-14 PROCEDURE — 93291 INTERROG DEV EVAL SCRMS IP: CPT | Performed by: INTERNAL MEDICINE

## 2023-03-14 PROCEDURE — G8484 FLU IMMUNIZE NO ADMIN: HCPCS | Performed by: INTERNAL MEDICINE

## 2023-03-14 PROCEDURE — G8400 PT W/DXA NO RESULTS DOC: HCPCS | Performed by: INTERNAL MEDICINE

## 2023-03-14 PROCEDURE — G8427 DOCREV CUR MEDS BY ELIG CLIN: HCPCS | Performed by: INTERNAL MEDICINE

## 2023-03-14 PROCEDURE — 3017F COLORECTAL CA SCREEN DOC REV: CPT | Performed by: INTERNAL MEDICINE

## 2023-03-14 NOTE — PROGRESS NOTES
Loop recorder interrogated. No new observations since last carelink that showed a bradycardia episode-patient was asymptomatic at the time. See scanned report for details.   Next carelink 4/17/23

## 2023-03-14 NOTE — PROGRESS NOTES
HISTORY  56-year-old lady with a history of sleep apnea, hypertension, and paroxysmal atrial fibrillation transfer follow-up. She has been maintained on Eliquis and propafenone 150 mg 3 times daily. Her loop recorder interrogation has demonstrated no atrial fibrillation on the last several visits. Her blood pressure has been somewhat labile prompting her to take clonidine when significantly elevated. Her lipids have been acceptable with last recorded values in our records from September 2021 LDL 71, HDL 56, and triglycerides 181. She had an episode of chest pain prompting an ER visit about a month ago which was described to reflux. Previously underwent angiographic assessment in 2017 with no evidence of atherosclerotic involvement. She has not been vaccinated for COVID-19. PHYSICAL EXAM  On exam she carries 207 pounds on a 5 feet 5 inches frame. Pressure is 112/72 pulse is 61. Very pleasant endomorphic middle-aged lady. EOMs full, sclerae and conjunctiva normal. PERRLA. Mask in place. Trachea midline with no neck masses. Assessment of internal jugular veins reveals no elevation of central venous pressure at 45 degrees. Carotid pulses normal without delay or bruit. Thyroid normal to palpation. Chest exam reveals normal respiratory effort without abnormal findings. Abdominal obesity. Normal bowel sounds without palpable mass or bruit. No clubbing or acrocyanosis. No significant lower extremity edema or signs of venous insufficiency. General motor strength appears to be within normal limits. Normal range of motion with normal gait. Alert, oriented x 3, memory and cognition normal as reflected by history and conversation. EKG reveals sinus rhythm and a right bundle branch block. ASSESSMENT/PLAN:   Paroxysmal atrial fibrillation -no recurrent episodes. Continue Eliquis and Rythmol  Hypertension -labile but adequate control.   Continue Benicar and spironolactone with clonidine for any marked elevations  Lipid status -acceptable off therapy  Obstructive sleep apnea -CPAP recommended

## 2023-04-17 DIAGNOSIS — R00.2 PALPITATIONS: ICD-10-CM

## 2023-04-17 DIAGNOSIS — Z95.818 STATUS POST PLACEMENT OF IMPLANTABLE LOOP RECORDER: Primary | ICD-10-CM

## 2023-04-17 DIAGNOSIS — I48.0 PAF (PAROXYSMAL ATRIAL FIBRILLATION) (HCC): ICD-10-CM

## 2023-04-17 PROCEDURE — 93298 REM INTERROG DEV EVAL SCRMS: CPT | Performed by: NURSE PRACTITIONER

## 2023-04-17 PROCEDURE — G2066 INTER DEVC REMOTE 30D: HCPCS | Performed by: NURSE PRACTITIONER

## 2023-05-18 DIAGNOSIS — I48.0 PAF (PAROXYSMAL ATRIAL FIBRILLATION) (HCC): ICD-10-CM

## 2023-05-18 DIAGNOSIS — R00.2 PALPITATIONS: ICD-10-CM

## 2023-05-18 DIAGNOSIS — Z95.818 STATUS POST PLACEMENT OF IMPLANTABLE LOOP RECORDER: Primary | ICD-10-CM

## 2023-05-18 DIAGNOSIS — I10 ESSENTIAL HYPERTENSION: ICD-10-CM

## 2023-05-30 DIAGNOSIS — R00.2 PALPITATIONS: ICD-10-CM

## 2023-05-30 DIAGNOSIS — Z95.818 STATUS POST PLACEMENT OF IMPLANTABLE LOOP RECORDER: Primary | ICD-10-CM

## 2023-05-30 DIAGNOSIS — I48.0 PAF (PAROXYSMAL ATRIAL FIBRILLATION) (HCC): ICD-10-CM

## 2023-05-30 DIAGNOSIS — I10 ESSENTIAL HYPERTENSION: ICD-10-CM

## 2023-06-05 DIAGNOSIS — Z95.818 STATUS POST PLACEMENT OF IMPLANTABLE LOOP RECORDER: Primary | ICD-10-CM

## 2023-06-05 DIAGNOSIS — I10 ESSENTIAL HYPERTENSION: ICD-10-CM

## 2023-06-05 DIAGNOSIS — I48.0 PAF (PAROXYSMAL ATRIAL FIBRILLATION) (HCC): ICD-10-CM

## 2023-06-05 DIAGNOSIS — R00.2 PALPITATIONS: ICD-10-CM

## 2023-06-20 DIAGNOSIS — Z95.818 IMPLANTABLE LOOP RECORDER PRESENT: Primary | ICD-10-CM

## 2023-06-20 DIAGNOSIS — R00.2 PALPITATIONS: ICD-10-CM

## 2023-07-26 DIAGNOSIS — I48.0 PAF (PAROXYSMAL ATRIAL FIBRILLATION) (HCC): ICD-10-CM

## 2023-07-26 DIAGNOSIS — R00.2 PALPITATIONS: ICD-10-CM

## 2023-07-26 DIAGNOSIS — I10 ESSENTIAL HYPERTENSION: ICD-10-CM

## 2023-07-26 DIAGNOSIS — Z95.818 STATUS POST PLACEMENT OF IMPLANTABLE LOOP RECORDER: Primary | ICD-10-CM

## 2023-07-26 PROCEDURE — 93298 REM INTERROG DEV EVAL SCRMS: CPT | Performed by: CLINICAL NURSE SPECIALIST

## 2023-07-26 PROCEDURE — G2066 INTER DEVC REMOTE 30D: HCPCS | Performed by: CLINICAL NURSE SPECIALIST

## 2023-08-29 DIAGNOSIS — Z95.818 STATUS POST PLACEMENT OF IMPLANTABLE LOOP RECORDER: Primary | ICD-10-CM

## 2023-08-29 DIAGNOSIS — I48.0 PAF (PAROXYSMAL ATRIAL FIBRILLATION) (HCC): ICD-10-CM

## 2023-08-29 DIAGNOSIS — R00.2 PALPITATIONS: ICD-10-CM

## 2023-08-29 PROCEDURE — 93298 REM INTERROG DEV EVAL SCRMS: CPT | Performed by: CLINICAL NURSE SPECIALIST

## 2023-08-29 PROCEDURE — G2066 INTER DEVC REMOTE 30D: HCPCS | Performed by: CLINICAL NURSE SPECIALIST

## 2023-09-20 ENCOUNTER — OFFICE VISIT (OUTPATIENT)
Dept: CARDIOLOGY CLINIC | Age: 68
End: 2023-09-20

## 2023-09-20 VITALS
SYSTOLIC BLOOD PRESSURE: 118 MMHG | WEIGHT: 214 LBS | BODY MASS INDEX: 35.65 KG/M2 | DIASTOLIC BLOOD PRESSURE: 76 MMHG | HEART RATE: 68 BPM | HEIGHT: 65 IN

## 2023-09-20 DIAGNOSIS — E78.2 MIXED HYPERLIPIDEMIA: ICD-10-CM

## 2023-09-20 DIAGNOSIS — Z45.09 ENCOUNTER FOR LOOP RECORDER CHECK: ICD-10-CM

## 2023-09-20 DIAGNOSIS — I48.0 PAF (PAROXYSMAL ATRIAL FIBRILLATION) (HCC): Primary | ICD-10-CM

## 2023-09-20 DIAGNOSIS — Z79.01 CHRONIC ANTICOAGULATION: ICD-10-CM

## 2023-09-20 DIAGNOSIS — I10 ESSENTIAL HYPERTENSION: ICD-10-CM

## 2023-09-20 NOTE — PATIENT INSTRUCTIONS
peanut, or canola oil when you cook. Bake, broil, and steam foods instead of frying them. Eat a variety of fruit and vegetables every day. Dark green, deep orange, red, or yellow fruits and vegetables are especially good for you. Examples include spinach, carrots, peaches, and berries. Foods high in fiber can reduce your cholesterol and provide important vitamins and minerals. High-fiber foods include whole-grain cereals and breads, oatmeal, beans, brown rice, citrus fruits, and apples. Eat lean proteins. Heart-healthy proteins include seafood, lean meats and poultry, eggs, beans, peas, nuts, seeds, and soy products. Limit drinks and foods with added sugar. These include candy, desserts, and soda pop. Lifestyle changes  If your doctor recommends it, get more exercise. Walking is a good choice. Bit by bit, increase the amount you walk every day. Try for at least 30 minutes on most days of the week. You also may want to swim, bike, or do other activities. Do not smoke. If you need help quitting, talk to your doctor about stop-smoking programs and medicines. These can increase your chances of quitting for good. Quitting smoking may be the most important step you can take to protect your heart. It is never too late to quit. Limit alcohol to 2 drinks a day for men and 1 drink a day for women. Too much alcohol can cause health problems. Manage other health problems such as diabetes, high blood pressure, and high cholesterol. If you think you may have a problem with alcohol or drug use, talk to your doctor. Medicines  Take your medicines exactly as prescribed. Call your doctor if you think you are having a problem with your medicine. If your doctor recommends aspirin, take the amount directed each day. Make sure you take aspirin and not another kind of pain reliever, such as acetaminophen (Tylenol). When should you call for help? [id number] if you have symptoms of a heart attack.  These may include:  Chest pain or

## 2023-09-20 NOTE — PROGRESS NOTES
[facility]. [facility]  [address] [place] [address], [address] [place] [address]  [phone] office  [phone] fax      OFFICE VISIT:  2023    Emerita Chambers Dies - : 1955  Reason For Visit:  Sandra Escobar is a 70 y.o. female who is here for 6 Month Follow-Up and Atrial Fibrillation (Patient decreased olmesartan on own, she states she monitors BP and someday she will not take any of it )    History:   Diagnosis Orders   1. PAF (paroxysmal atrial fibrillation) ([address])        2. Encounter for loop recorder check [Z45.09 (ICD-10-CM)]        3. Essential hypertension        4. Mixed hyperlipidemia        5. Chronic anticoagulation      Eliquis        The patient presents today for cardiology follow up and loop recorder check. She is a 70year old with history of sleep apnea, hypertension, dyslipidemia, and paroxysmal atrial fibrillation. She has been maintained on Eliquis and propafenone 150 mg 3 times daily. The patient had a loop recorder placed to monitor efficacy of therapy. The patient denies symptoms to suggest myocardial ischemia, heart failure or sustained arrhythmia. BP runs low much of the time. As a result the patient take Benicar 20 mg 1/4 tab as needed. No bleeding issues reported on Eliquis. She notices occasional palpitations with hx of PVCs (burden down on current interrogation to 1%). The patient's PCP monitors cholesterol. Subjective  Emerita denies exertional chest pain, shortness of breath, orthopnea, paroxysmal nocturnal dyspnea, syncope, presyncope, edema and fatigue. The patient denies numbness or weakness to suggest cerebrovascular accident or transient ischemic attack. + occasional palpitations.     Eric Buchanan has the following history as recorded in [employer]:  Patient Active Problem List   Diagnosis Code    PAF (paroxysmal atrial fibrillation) ([facility]) I48.0    Abnormal stress echo R94.39    Precordial pain R07.2    Atrial

## 2023-10-24 DIAGNOSIS — R00.2 PALPITATIONS: ICD-10-CM

## 2023-10-24 DIAGNOSIS — Z95.818 STATUS POST PLACEMENT OF IMPLANTABLE LOOP RECORDER: Primary | ICD-10-CM

## 2023-10-24 DIAGNOSIS — I48.0 PAF (PAROXYSMAL ATRIAL FIBRILLATION) (HCC): ICD-10-CM

## 2023-10-24 DIAGNOSIS — I10 ESSENTIAL HYPERTENSION: ICD-10-CM

## 2023-10-24 PROCEDURE — 93298 REM INTERROG DEV EVAL SCRMS: CPT | Performed by: NURSE PRACTITIONER

## 2023-10-24 PROCEDURE — G2066 INTER DEVC REMOTE 30D: HCPCS | Performed by: NURSE PRACTITIONER

## 2023-10-25 DIAGNOSIS — I48.0 PAF (PAROXYSMAL ATRIAL FIBRILLATION) (HCC): ICD-10-CM

## 2023-10-25 DIAGNOSIS — Z95.818 STATUS POST PLACEMENT OF IMPLANTABLE LOOP RECORDER: Primary | ICD-10-CM

## 2023-10-25 DIAGNOSIS — I10 ESSENTIAL HYPERTENSION: ICD-10-CM

## 2023-10-25 DIAGNOSIS — R00.2 PALPITATIONS: ICD-10-CM

## 2023-11-28 DIAGNOSIS — I48.0 PAF (PAROXYSMAL ATRIAL FIBRILLATION) (HCC): ICD-10-CM

## 2023-11-28 DIAGNOSIS — I10 ESSENTIAL HYPERTENSION: ICD-10-CM

## 2023-11-28 DIAGNOSIS — Z95.818 STATUS POST PLACEMENT OF IMPLANTABLE LOOP RECORDER: Primary | ICD-10-CM

## 2023-11-28 PROCEDURE — G2066 INTER DEVC REMOTE 30D: HCPCS | Performed by: NURSE PRACTITIONER

## 2023-11-28 PROCEDURE — 93298 REM INTERROG DEV EVAL SCRMS: CPT | Performed by: NURSE PRACTITIONER

## 2024-01-03 DIAGNOSIS — I10 ESSENTIAL HYPERTENSION: ICD-10-CM

## 2024-01-03 DIAGNOSIS — I48.0 PAF (PAROXYSMAL ATRIAL FIBRILLATION) (HCC): ICD-10-CM

## 2024-01-03 DIAGNOSIS — Z95.818 STATUS POST PLACEMENT OF IMPLANTABLE LOOP RECORDER: Primary | ICD-10-CM

## 2024-01-03 PROCEDURE — 93298 REM INTERROG DEV EVAL SCRMS: CPT | Performed by: NURSE PRACTITIONER

## 2024-01-19 DIAGNOSIS — Z95.818 STATUS POST PLACEMENT OF IMPLANTABLE LOOP RECORDER: Primary | ICD-10-CM

## 2024-01-19 DIAGNOSIS — I48.0 PAF (PAROXYSMAL ATRIAL FIBRILLATION) (HCC): ICD-10-CM

## 2024-01-19 DIAGNOSIS — R00.2 PALPITATIONS: ICD-10-CM

## 2024-02-05 DIAGNOSIS — Z95.818 STATUS POST PLACEMENT OF IMPLANTABLE LOOP RECORDER: Primary | ICD-10-CM

## 2024-02-05 DIAGNOSIS — I48.0 PAF (PAROXYSMAL ATRIAL FIBRILLATION) (HCC): ICD-10-CM

## 2024-02-05 DIAGNOSIS — R00.2 PALPITATIONS: ICD-10-CM

## 2024-02-19 DIAGNOSIS — I48.0 PAF (PAROXYSMAL ATRIAL FIBRILLATION) (HCC): ICD-10-CM

## 2024-02-19 DIAGNOSIS — Z95.818 IMPLANTABLE LOOP RECORDER PRESENT: Primary | ICD-10-CM

## 2024-02-23 ENCOUNTER — TELEPHONE (OUTPATIENT)
Dept: CARDIOLOGY CLINIC | Age: 69
End: 2024-02-23

## 2024-02-23 NOTE — TELEPHONE ENCOUNTER
Patient states she has been in [place] and is in afib, spoke with nurse in office, got the ok to put patient on for 2/27 with Maureen rodríguez. Patient is concerned and would like someone to call her back about her loop recorder to see if everything is okay she states heart is racing.     Thank you

## 2024-02-25 ENCOUNTER — HOSPITAL ENCOUNTER (EMERGENCY)
Age: 69
Discharge: HOME | End: 2024-02-25
Payer: MEDICARE

## 2024-02-25 ENCOUNTER — APPOINTMENT (OUTPATIENT)
Dept: GENERAL RADIOLOGY | Age: 69
End: 2024-02-25
Payer: MEDICARE

## 2024-02-25 VITALS
HEART RATE: 113 BPM | DIASTOLIC BLOOD PRESSURE: 83 MMHG | WEIGHT: 210 LBS | RESPIRATION RATE: 14 BRPM | BODY MASS INDEX: 34.95 KG/M2 | TEMPERATURE: 97.8 F | OXYGEN SATURATION: 99 % | SYSTOLIC BLOOD PRESSURE: 144 MMHG

## 2024-02-25 DIAGNOSIS — I48.19 ATRIAL FIBRILLATION, PERSISTENT (HCC): Primary | ICD-10-CM

## 2024-02-25 LAB
ALBUMIN SERPL-MCNC: 4.4 G/DL (ref 3.5–5.2)
ALP SERPL-CCNC: 109 U/L (ref 35–104)
ALT SERPL-CCNC: 29 U/L (ref 5–33)
ANION GAP SERPL CALCULATED.3IONS-SCNC: 10 MMOL/L (ref 7–19)
BASOPHILS # BLD: 0 K/UL (ref 0–0.2)
BASOPHILS NFR BLD: 0.4 % (ref 0–1)
BILIRUB SERPL-MCNC: 0.3 MG/DL (ref 0.2–1.2)
BUN SERPL-MCNC: 20 MG/DL (ref 8–23)
CALCIUM SERPL-MCNC: 9.7 MG/DL (ref 8.8–10.2)
CHLORIDE SERPL-SCNC: 102 MMOL/L (ref 98–111)
CO2 SERPL-SCNC: 26 MMOL/L (ref 22–29)
CREAT SERPL-MCNC: 0.8 MG/DL (ref 0.5–0.9)
EOSINOPHIL # BLD: 0.2 K/UL (ref 0–0.6)
EOSINOPHIL NFR BLD: 2.7 % (ref 0–5)
ERYTHROCYTE [DISTWIDTH] IN BLOOD BY AUTOMATED COUNT: 13.6 % (ref 11.5–14.5)
GLUCOSE SERPL-MCNC: 103 MG/DL (ref 74–109)
HCT VFR BLD AUTO: 38.9 % (ref 37–47)
HGB BLD-MCNC: 13.4 G/DL (ref 12–16)
IMM GRANULOCYTES # BLD: 0 K/UL
LYMPHOCYTES # BLD: 2.3 K/UL (ref 1.1–4.5)
LYMPHOCYTES NFR BLD: 30.1 % (ref 20–40)
MCH RBC QN AUTO: 31 PG (ref 27–31)
MCHC RBC AUTO-ENTMCNC: 34.4 G/DL (ref 33–37)
MCV RBC AUTO: 90 FL (ref 81–99)
MONOCYTES # BLD: 1 K/UL (ref 0–0.9)
MONOCYTES NFR BLD: 13.9 % (ref 0–10)
NEUTROPHILS # BLD: 3.9 K/UL (ref 1.5–7.5)
NEUTS SEG NFR BLD: 52.5 % (ref 50–65)
PLATELET # BLD AUTO: 208 K/UL (ref 130–400)
PMV BLD AUTO: 10.8 FL (ref 9.4–12.3)
POTASSIUM SERPL-SCNC: 4.3 MMOL/L (ref 3.5–5)
PROT SERPL-MCNC: 6.8 G/DL (ref 6.6–8.7)
RBC # BLD AUTO: 4.32 M/UL (ref 4.2–5.4)
SODIUM SERPL-SCNC: 138 MMOL/L (ref 136–145)
TROPONIN, HIGH SENSITIVITY: 15 NG/L (ref 0–14)
TSH SERPL DL<=0.005 MIU/L-ACNC: 2.6 UIU/ML (ref 0.35–5.5)
WBC # BLD AUTO: 7.5 K/UL (ref 4.8–10.8)

## 2024-02-25 PROCEDURE — 99285 EMERGENCY DEPT VISIT HI MDM: CPT

## 2024-02-25 PROCEDURE — 84443 ASSAY THYROID STIM HORMONE: CPT

## 2024-02-25 PROCEDURE — 93005 ELECTROCARDIOGRAM TRACING: CPT | Performed by: EMERGENCY MEDICINE

## 2024-02-25 PROCEDURE — 36415 COLL VENOUS BLD VENIPUNCTURE: CPT

## 2024-02-25 PROCEDURE — 71045 X-RAY EXAM CHEST 1 VIEW: CPT

## 2024-02-25 PROCEDURE — 80053 COMPREHEN METABOLIC PANEL: CPT

## 2024-02-25 PROCEDURE — 85025 COMPLETE CBC W/AUTO DIFF WBC: CPT

## 2024-02-25 PROCEDURE — 84484 ASSAY OF TROPONIN QUANT: CPT

## 2024-02-25 NOTE — ED PROVIDER NOTES
vitamin C 250 MG tablet     VITAMIN D3 PO     Zinc Sulfate 220 (50 Zn) MG Tabs                (Pleasenote that portions of this note were completed with a voice recognition program.  Efforts were made to edit the dictations but occasionally words are mis-transcribed.)              Sae Pretty, CHUCK - CNP  02/25/24 2943       Sae Pretty APRN - CNP  02/25/24 1207

## 2024-02-26 LAB
EKG P AXIS: NORMAL DEGREES
EKG P-R INTERVAL: NORMAL MS
EKG Q-T INTERVAL: 320 MS
EKG QRS DURATION: 142 MS
EKG QTC CALCULATION (BAZETT): 395 MS
EKG T AXIS: 138 DEGREES

## 2024-02-26 PROCEDURE — 93010 ELECTROCARDIOGRAM REPORT: CPT | Performed by: INTERNAL MEDICINE

## 2024-02-27 ENCOUNTER — OFFICE VISIT (OUTPATIENT)
Dept: CARDIOLOGY CLINIC | Age: 69
End: 2024-02-27

## 2024-02-27 VITALS
DIASTOLIC BLOOD PRESSURE: 66 MMHG | OXYGEN SATURATION: 96 % | HEIGHT: 65 IN | BODY MASS INDEX: 35.65 KG/M2 | WEIGHT: 214 LBS | SYSTOLIC BLOOD PRESSURE: 120 MMHG | HEART RATE: 72 BPM

## 2024-02-27 DIAGNOSIS — I48.0 PAF (PAROXYSMAL ATRIAL FIBRILLATION) (HCC): Primary | ICD-10-CM

## 2024-02-27 DIAGNOSIS — I10 ESSENTIAL HYPERTENSION: ICD-10-CM

## 2024-02-27 DIAGNOSIS — E78.2 MIXED HYPERLIPIDEMIA: ICD-10-CM

## 2024-02-27 DIAGNOSIS — Z45.09 ENCOUNTER FOR LOOP RECORDER CHECK: ICD-10-CM

## 2024-02-27 RX ORDER — LEVOTHYROXINE SODIUM 0.2 MG/1
200 TABLET ORAL DAILY
COMMUNITY

## 2024-02-27 RX ORDER — FAMOTIDINE 40 MG/1
40 TABLET, FILM COATED ORAL DAILY
COMMUNITY
Start: 2024-01-10

## 2024-02-27 RX ORDER — OLMESARTAN MEDOXOMIL 20 MG/1
20 TABLET ORAL PRN
COMMUNITY
Start: 2020-12-07 | End: 2024-02-27

## 2024-02-27 NOTE — PROGRESS NOTES
[facility]  [address], [address], [facility]  [address]  [phone] office  [phone] fax      OFFICE VISIT:  2024    Emerita Orourke - : 1955  Reason For Visit:  Emerita is a 68 y.o. female who is here for Atrial Fibrillation (Loop Recorder check, patient is in Afib she states. )    History:   Diagnosis Orders   1. PAF (paroxysmal atrial fibrillation) ([facility])        2. Encounter for loop recorder check [Z45.09 (ICD-10-CM)]        3. Essential hypertension        4. Mixed hyperlipidemia          The patient presents today for follow up.  She was hospitalized recently in [place], [place] for AF with RVR.    Hospital Phone [phone]  Records are not available for review today.    The patient reports \"my Rythmol was changed but I have not taken it like prescribed as it makes my blood pressure drop when I take Rythmol 300 mg dose.\"  The patient reports being told to take Rythmol 300 mg twice daily.  She has only done so yesterday and this AM with lower BP readings.  The patient continues on Eliquis with no bleeding issues. BP today 120/66.      Subjective  Emerita denies exertional chest pain, shortness of breath, orthopnea, paroxysmal nocturnal dyspnea, syncope, presyncope and edema.   The patient denies numbness or weakness to suggest cerebrovascular accident or transient ischemic attack.  + fatigue.  + sensed arrhythmia.    Emerita Orourke has the following history as recorded in [facility]:  Patient Active Problem List   Diagnosis Code    PAF (paroxysmal atrial fibrillation) (HCC) I48.0    Abnormal stress echo R94.39    Precordial pain R07.2    Atrial fibrillation with RVR ([facility]) I48.91    Essential hypertension I10    Rhinorrhea J34.89    Chronic anticoagulation Z79.01    Acquired hypothyroidism E03.9    Hyperlipidemia E78.5    Hyperglycemia R73.9    Atrial fibrillation (HCC) I48.91     Past Medical History:   Diagnosis Date

## 2024-02-27 NOTE — PATIENT INSTRUCTIONS
will adjust to less salt.  Eat fewer snack items, fast foods, canned soups, and other high-salt, high-fat, processed foods.  Read food labels and try to avoid saturated and trans fats. They increase your risk of heart disease by raising cholesterol levels.  Limit the amount of solid fat-butter, margarine, and shortening-you eat. Use olive, peanut, or canola oil when you cook. Bake, broil, and steam foods instead of frying them.  Eat a variety of fruit and vegetables every day. Dark green, deep orange, red, or yellow fruits and vegetables are especially good for you. Examples include spinach, carrots, peaches, and berries.  Foods high in fiber can reduce your cholesterol and provide important vitamins and minerals. High-fiber foods include whole-grain cereals and breads, oatmeal, beans, brown rice, citrus fruits, and apples.  Eat lean proteins. Heart-healthy proteins include seafood, lean meats and poultry, eggs, beans, peas, nuts, seeds, and soy products.  Limit drinks and foods with added sugar. These include candy, desserts, and soda pop.  Lifestyle changes  If your doctor recommends it, get more exercise. Walking is a good choice. Bit by bit, increase the amount you walk every day. Try for at least 30 minutes on most days of the week. You also may want to swim, bike, or do other activities.  Do not smoke. If you need help quitting, talk to your doctor about stop-smoking programs and medicines. These can increase your chances of quitting for good. Quitting smoking may be the most important step you can take to protect your heart. It is never too late to quit.  Limit alcohol to 2 drinks a day for men and 1 drink a day for women. Too much alcohol can cause health problems.  Manage other health problems such as diabetes, high blood pressure, and high cholesterol. If you think you may have a problem with alcohol or drug use, talk to your doctor.  Medicines  Take your medicines exactly as prescribed. Call your doctor

## 2024-03-14 DIAGNOSIS — Z95.818 STATUS POST PLACEMENT OF IMPLANTABLE LOOP RECORDER: Primary | ICD-10-CM

## 2024-03-14 DIAGNOSIS — R00.2 PALPITATIONS: ICD-10-CM

## 2024-03-14 DIAGNOSIS — I48.0 PAF (PAROXYSMAL ATRIAL FIBRILLATION) (HCC): ICD-10-CM

## 2024-03-26 DIAGNOSIS — I47.29 PVT (PAROXYSMAL VENTRICULAR TACHYCARDIA) (HCC): Primary | ICD-10-CM

## 2024-03-26 DIAGNOSIS — R06.02 SOB (SHORTNESS OF BREATH): ICD-10-CM

## 2024-03-27 DIAGNOSIS — Z95.818 STATUS POST PLACEMENT OF IMPLANTABLE LOOP RECORDER: Primary | ICD-10-CM

## 2024-03-27 DIAGNOSIS — R00.2 PALPITATIONS: ICD-10-CM

## 2024-03-27 RX ORDER — ATENOLOL 25 MG/1
25 TABLET ORAL DAILY
Qty: 30 TABLET | Refills: 1 | Status: SHIPPED | OUTPATIENT
Start: 2024-03-27

## 2024-03-28 ENCOUNTER — OFFICE VISIT (OUTPATIENT)
Dept: CARDIOLOGY CLINIC | Age: 69
End: 2024-03-28
Payer: MEDICARE

## 2024-03-28 VITALS
BODY MASS INDEX: 35.82 KG/M2 | HEIGHT: 65 IN | HEART RATE: 97 BPM | DIASTOLIC BLOOD PRESSURE: 78 MMHG | SYSTOLIC BLOOD PRESSURE: 134 MMHG | WEIGHT: 215 LBS | OXYGEN SATURATION: 98 %

## 2024-03-28 DIAGNOSIS — I48.0 PAF (PAROXYSMAL ATRIAL FIBRILLATION) (HCC): Primary | ICD-10-CM

## 2024-03-28 PROCEDURE — 1036F TOBACCO NON-USER: CPT | Performed by: INTERNAL MEDICINE

## 2024-03-28 PROCEDURE — 1090F PRES/ABSN URINE INCON ASSESS: CPT | Performed by: INTERNAL MEDICINE

## 2024-03-28 PROCEDURE — G8400 PT W/DXA NO RESULTS DOC: HCPCS | Performed by: INTERNAL MEDICINE

## 2024-03-28 PROCEDURE — G8417 CALC BMI ABV UP PARAM F/U: HCPCS | Performed by: INTERNAL MEDICINE

## 2024-03-28 PROCEDURE — 99205 OFFICE O/P NEW HI 60 MIN: CPT | Performed by: INTERNAL MEDICINE

## 2024-03-28 PROCEDURE — 3075F SYST BP GE 130 - 139MM HG: CPT | Performed by: INTERNAL MEDICINE

## 2024-03-28 PROCEDURE — G8484 FLU IMMUNIZE NO ADMIN: HCPCS | Performed by: INTERNAL MEDICINE

## 2024-03-28 PROCEDURE — G8427 DOCREV CUR MEDS BY ELIG CLIN: HCPCS | Performed by: INTERNAL MEDICINE

## 2024-03-28 PROCEDURE — 1123F ACP DISCUSS/DSCN MKR DOCD: CPT | Performed by: INTERNAL MEDICINE

## 2024-03-28 PROCEDURE — 3078F DIAST BP <80 MM HG: CPT | Performed by: INTERNAL MEDICINE

## 2024-03-28 PROCEDURE — 3017F COLORECTAL CA SCREEN DOC REV: CPT | Performed by: INTERNAL MEDICINE

## 2024-03-28 RX ORDER — SOTALOL HYDROCHLORIDE 80 MG/1
40 TABLET ORAL 2 TIMES DAILY
Qty: 60 TABLET | Refills: 3 | Status: SHIPPED | OUTPATIENT
Start: 2024-04-01

## 2024-03-28 NOTE — PATIENT INSTRUCTIONS
Stop Rythmol tomorrow 3/28/2024, Increase Atenolol to 50mg today 3/28/204, Start Sotalol 40mg on Monday

## 2024-03-28 NOTE — PROGRESS NOTES
tablet at noon and 1 tablet in the evening.), Disp: 90 tablet, Rfl: 3    Zinc Sulfate 220 (50 Zn) MG TABS, Take 1 tablet by mouth daily, Disp: , Rfl:     Cholecalciferol (VITAMIN D3 PO), Take 1,000 mg by mouth daily, Disp: , Rfl:     calcium carbonate (OSCAL) 500 MG TABS tablet, Take 1 tablet by mouth daily, Disp: , Rfl:     spironolactone (ALDACTONE) 25 MG tablet, Take 0.5 tablets by mouth daily, Disp: , Rfl:     Omega-3 Fatty Acids (FISH OIL) 1000 MG CAPS, Take 1 capsule by mouth 2 times daily, Disp: , Rfl:     PE:  Vitals:    03/28/24 0931   BP: 134/78   Pulse: 97   SpO2: 98%   Weight: 97.5 kg (215 lb)   Height: 1.651 m (5' 5\")       Constitutional: She is oriented to person, place, and time. She appears well-developed and well-nourished in no acute distress.  Neck:  Neck supple without JVD present. No trachea deviation present. No thyromegaly present.   Eyes:Conjunctivae and EOM are normal. Pupils equal and reactive to light.   ENT:Hearing appears normal.conjunctiva and lids are normal, ears and nose appear normal.  Cardiovascular: Normal rate, S1-S2 irregular rapid rhythm, normal heart sounds.  No murmur ascultated.  No gallop and no friction rub.  No carotid bruits.  No peripheral edema.    Pulmonary/Chest:  Lungs clear to auscultation bilaterally without evidence of respiratory distress.  She without wheezes. She without rales or ronchi.   Musculoskeletal: Normal range of motion.  Gait is normal  Head is normocephalic and atraumatic.  Skin: Skin is warm and dry without rash or pallor.   Psychiatric:She is alert and oriented to person, place, and time.  She has a normal mood and affect. Her behavior is normal. Thought content normal.     Estimated body mass index is 35.78 kg/m² as calculated from the following:    Height as of this encounter: 1.651 m (5' 5\").    Weight as of this encounter: 97.5 kg (215 lb).    Lab Results   Component Value Date/Time    CREATININE 0.8 02/25/2024 03:58 PM    CREATININE 0.8

## 2024-04-03 DIAGNOSIS — Z95.818 STATUS POST PLACEMENT OF IMPLANTABLE LOOP RECORDER: Primary | ICD-10-CM

## 2024-04-03 DIAGNOSIS — R00.2 PALPITATIONS: ICD-10-CM

## 2024-04-03 DIAGNOSIS — I48.0 PAF (PAROXYSMAL ATRIAL FIBRILLATION) (HCC): ICD-10-CM

## 2024-04-09 ENCOUNTER — OFFICE VISIT (OUTPATIENT)
Dept: CARDIOLOGY CLINIC | Age: 69
End: 2024-04-09
Payer: MEDICARE

## 2024-04-09 VITALS
BODY MASS INDEX: 36.65 KG/M2 | HEART RATE: 55 BPM | DIASTOLIC BLOOD PRESSURE: 84 MMHG | SYSTOLIC BLOOD PRESSURE: 150 MMHG | WEIGHT: 220 LBS | HEIGHT: 65 IN

## 2024-04-09 DIAGNOSIS — I48.0 PAF (PAROXYSMAL ATRIAL FIBRILLATION) (HCC): Primary | ICD-10-CM

## 2024-04-09 PROCEDURE — G8417 CALC BMI ABV UP PARAM F/U: HCPCS | Performed by: INTERNAL MEDICINE

## 2024-04-09 PROCEDURE — 99214 OFFICE O/P EST MOD 30 MIN: CPT | Performed by: INTERNAL MEDICINE

## 2024-04-09 PROCEDURE — 1090F PRES/ABSN URINE INCON ASSESS: CPT | Performed by: INTERNAL MEDICINE

## 2024-04-09 PROCEDURE — 3017F COLORECTAL CA SCREEN DOC REV: CPT | Performed by: INTERNAL MEDICINE

## 2024-04-09 PROCEDURE — 1036F TOBACCO NON-USER: CPT | Performed by: INTERNAL MEDICINE

## 2024-04-09 PROCEDURE — 1123F ACP DISCUSS/DSCN MKR DOCD: CPT | Performed by: INTERNAL MEDICINE

## 2024-04-09 PROCEDURE — G8427 DOCREV CUR MEDS BY ELIG CLIN: HCPCS | Performed by: INTERNAL MEDICINE

## 2024-04-09 PROCEDURE — 3077F SYST BP >= 140 MM HG: CPT | Performed by: INTERNAL MEDICINE

## 2024-04-09 PROCEDURE — 93000 ELECTROCARDIOGRAM COMPLETE: CPT | Performed by: INTERNAL MEDICINE

## 2024-04-09 PROCEDURE — G8400 PT W/DXA NO RESULTS DOC: HCPCS | Performed by: INTERNAL MEDICINE

## 2024-04-09 PROCEDURE — 3079F DIAST BP 80-89 MM HG: CPT | Performed by: INTERNAL MEDICINE

## 2024-04-16 ENCOUNTER — OFFICE VISIT (OUTPATIENT)
Dept: CARDIOLOGY CLINIC | Age: 69
End: 2024-04-16
Payer: MEDICARE

## 2024-04-16 VITALS
SYSTOLIC BLOOD PRESSURE: 178 MMHG | BODY MASS INDEX: 35.99 KG/M2 | HEIGHT: 65 IN | WEIGHT: 216 LBS | HEART RATE: 78 BPM | DIASTOLIC BLOOD PRESSURE: 82 MMHG

## 2024-04-16 DIAGNOSIS — I48.0 PAF (PAROXYSMAL ATRIAL FIBRILLATION) (HCC): Primary | ICD-10-CM

## 2024-04-16 PROCEDURE — 1036F TOBACCO NON-USER: CPT | Performed by: INTERNAL MEDICINE

## 2024-04-16 PROCEDURE — 99213 OFFICE O/P EST LOW 20 MIN: CPT | Performed by: INTERNAL MEDICINE

## 2024-04-16 PROCEDURE — 3017F COLORECTAL CA SCREEN DOC REV: CPT | Performed by: INTERNAL MEDICINE

## 2024-04-16 PROCEDURE — 3079F DIAST BP 80-89 MM HG: CPT | Performed by: INTERNAL MEDICINE

## 2024-04-16 PROCEDURE — G8427 DOCREV CUR MEDS BY ELIG CLIN: HCPCS | Performed by: INTERNAL MEDICINE

## 2024-04-16 PROCEDURE — G8417 CALC BMI ABV UP PARAM F/U: HCPCS | Performed by: INTERNAL MEDICINE

## 2024-04-16 PROCEDURE — 1123F ACP DISCUSS/DSCN MKR DOCD: CPT | Performed by: INTERNAL MEDICINE

## 2024-04-16 PROCEDURE — 3077F SYST BP >= 140 MM HG: CPT | Performed by: INTERNAL MEDICINE

## 2024-04-16 PROCEDURE — 93000 ELECTROCARDIOGRAM COMPLETE: CPT | Performed by: INTERNAL MEDICINE

## 2024-04-16 PROCEDURE — G8400 PT W/DXA NO RESULTS DOC: HCPCS | Performed by: INTERNAL MEDICINE

## 2024-04-16 PROCEDURE — 1090F PRES/ABSN URINE INCON ASSESS: CPT | Performed by: INTERNAL MEDICINE

## 2024-04-16 NOTE — PROGRESS NOTES
[facility]  [address]  [address]  [facility] [address]  [phone]    Emerita Orourke is a 68 y.o. female presents with atrial fibrillation and hypertension.  She is currently taking 40 mg twice daily of sotalol and after she stopped the atenolol her ectopy became much worse.  Likewise, her blood pressure is way too high      Review of Systems   Constitutional: Negative for fever, chills, diaphoresis, activity change, appetite change, fatigue and unexpected weight change.   Eyes: Negative for photophobia, pain, redness and visual disturbance.   Respiratory: Negative for apnea, cough, chest tightness, shortness of breath, wheezing and stridor.    Cardiovascular: Negative for chest pain, palpitations and leg swelling.   Gastrointestinal: Negative for abdominal distention.   Genitourinary: Negative for dysuria, urgency and frequency.   Musculoskeletal: Negative for myalgias, arthralgias and gait problem.   Skin: Negative for color change, pallor, rash and wound.   Neurological: Negative for dizziness, tremors, speech difficulty, weakness and numbness.   Hematological: Does not bruise/bleed easily.   Psychiatric/Behavioral: Negative.          Social History     Socioeconomic History    Marital status:      Spouse name: Not on file    Number of children: Not on file    Years of education: Not on file    Highest education level: Not on file   Occupational History    Not on file   Tobacco Use    Smoking status: Never    Smokeless tobacco: Never   Vaping Use    Vaping Use: Never used   Substance and Sexual Activity    Alcohol use: No    Drug use: No    Sexual activity: Not on file   Other Topics Concern    Not on file   Social History Narrative    Not on file     Social Determinants of Health     Financial Resource Strain: Not on file   Food Insecurity: Not on file   Transportation Needs: Not on file   Physical Activity: Not on file   Stress: Not on file   Social Connections: Not on file   Intimate

## 2024-04-22 RX ORDER — SOTALOL HYDROCHLORIDE 80 MG/1
80 TABLET ORAL 2 TIMES DAILY
Qty: 60 TABLET | Refills: 3 | Status: SHIPPED | OUTPATIENT
Start: 2024-04-22

## 2024-04-22 RX ORDER — OLMESARTAN MEDOXOMIL 20 MG/1
20 TABLET ORAL 2 TIMES DAILY
Qty: 90 TABLET | Refills: 5 | Status: SHIPPED | OUTPATIENT
Start: 2024-04-22

## 2024-04-23 ENCOUNTER — OFFICE VISIT (OUTPATIENT)
Dept: CARDIOLOGY CLINIC | Age: 69
End: 2024-04-23
Payer: MEDICARE

## 2024-04-23 VITALS
SYSTOLIC BLOOD PRESSURE: 152 MMHG | WEIGHT: 215 LBS | HEIGHT: 65 IN | HEART RATE: 55 BPM | DIASTOLIC BLOOD PRESSURE: 82 MMHG | BODY MASS INDEX: 35.82 KG/M2

## 2024-04-23 DIAGNOSIS — I48.0 PAF (PAROXYSMAL ATRIAL FIBRILLATION) (HCC): Primary | ICD-10-CM

## 2024-04-23 PROCEDURE — 3017F COLORECTAL CA SCREEN DOC REV: CPT | Performed by: INTERNAL MEDICINE

## 2024-04-23 PROCEDURE — 1123F ACP DISCUSS/DSCN MKR DOCD: CPT | Performed by: INTERNAL MEDICINE

## 2024-04-23 PROCEDURE — G8427 DOCREV CUR MEDS BY ELIG CLIN: HCPCS | Performed by: INTERNAL MEDICINE

## 2024-04-23 PROCEDURE — 1036F TOBACCO NON-USER: CPT | Performed by: INTERNAL MEDICINE

## 2024-04-23 PROCEDURE — G8400 PT W/DXA NO RESULTS DOC: HCPCS | Performed by: INTERNAL MEDICINE

## 2024-04-23 PROCEDURE — 3079F DIAST BP 80-89 MM HG: CPT | Performed by: INTERNAL MEDICINE

## 2024-04-23 PROCEDURE — 93000 ELECTROCARDIOGRAM COMPLETE: CPT | Performed by: INTERNAL MEDICINE

## 2024-04-23 PROCEDURE — 99213 OFFICE O/P EST LOW 20 MIN: CPT | Performed by: INTERNAL MEDICINE

## 2024-04-23 PROCEDURE — G8417 CALC BMI ABV UP PARAM F/U: HCPCS | Performed by: INTERNAL MEDICINE

## 2024-04-23 PROCEDURE — 3077F SYST BP >= 140 MM HG: CPT | Performed by: INTERNAL MEDICINE

## 2024-04-23 PROCEDURE — 1090F PRES/ABSN URINE INCON ASSESS: CPT | Performed by: INTERNAL MEDICINE

## 2024-04-23 NOTE — PROGRESS NOTES
[facility]  [address]  [address]  [facility] [address]  [phone]    Emerita Orourke is a 68 y.o. female presents with atrial fibrillation and hypertension.  After increasing the sotalol dose she is maintaining sinus rhythm without any A-fib.  After adding the olmesartan her blood pressures are much better and hanging in the 130-150 range systolic now.      Review of Systems   Constitutional: Negative for fever, chills, diaphoresis, activity change, appetite change, fatigue and unexpected weight change.   Eyes: Negative for photophobia, pain, redness and visual disturbance.   Respiratory: Negative for apnea, cough, chest tightness, shortness of breath, wheezing and stridor.    Cardiovascular: Negative for chest pain, palpitations and leg swelling.   Gastrointestinal: Negative for abdominal distention.   Genitourinary: Negative for dysuria, urgency and frequency.   Musculoskeletal: Negative for myalgias, arthralgias and gait problem.   Skin: Negative for color change, pallor, rash and wound.   Neurological: Negative for dizziness, tremors, speech difficulty, weakness and numbness.   Hematological: Does not bruise/bleed easily.   Psychiatric/Behavioral: Negative.          Social History     Socioeconomic History    Marital status:      Spouse name: Not on file    Number of children: Not on file    Years of education: Not on file    Highest education level: Not on file   Occupational History    Not on file   Tobacco Use    Smoking status: Never    Smokeless tobacco: Never   Vaping Use    Vaping Use: Never used   Substance and Sexual Activity    Alcohol use: No    Drug use: No    Sexual activity: Not on file   Other Topics Concern    Not on file   Social History Narrative    Not on file     Social Determinants of Health     Financial Resource Strain: Not on file   Food Insecurity: Not on file   Transportation Needs: Not on file   Physical Activity: Not on file   Stress: Not on file   Social

## 2024-04-26 ENCOUNTER — HOSPITAL ENCOUNTER (OUTPATIENT)
Dept: NUCLEAR MEDICINE | Age: 69
Discharge: HOME | End: 2024-04-28
Payer: MEDICARE

## 2024-04-26 DIAGNOSIS — I47.29 PVT (PAROXYSMAL VENTRICULAR TACHYCARDIA) (HCC): ICD-10-CM

## 2024-04-26 DIAGNOSIS — R06.02 SOB (SHORTNESS OF BREATH): ICD-10-CM

## 2024-04-26 PROCEDURE — 78452 HT MUSCLE IMAGE SPECT MULT: CPT

## 2024-04-26 PROCEDURE — A9502 TC99M TETROFOSMIN: HCPCS | Performed by: NURSE PRACTITIONER

## 2024-04-26 PROCEDURE — 93017 CV STRESS TEST TRACING ONLY: CPT

## 2024-04-26 PROCEDURE — 6360000002 HC RX W HCPCS: Performed by: NURSE PRACTITIONER

## 2024-04-26 PROCEDURE — 3430000000 HC RX DIAGNOSTIC RADIOPHARMACEUTICAL: Performed by: NURSE PRACTITIONER

## 2024-04-26 RX ORDER — REGADENOSON 0.08 MG/ML
0.4 INJECTION, SOLUTION INTRAVENOUS
Status: COMPLETED | OUTPATIENT
Start: 2024-04-26 | End: 2024-04-26

## 2024-04-26 RX ADMIN — REGADENOSON 0.4 MG: 0.08 INJECTION, SOLUTION INTRAVENOUS at 09:51

## 2024-04-26 RX ADMIN — TETROFOSMIN 24 MILLICURIE: 0.23 INJECTION, POWDER, LYOPHILIZED, FOR SOLUTION INTRAVENOUS at 11:48

## 2024-04-26 RX ADMIN — TETROFOSMIN 8 MILLICURIE: 0.23 INJECTION, POWDER, LYOPHILIZED, FOR SOLUTION INTRAVENOUS at 11:49

## 2024-04-30 PROCEDURE — 78452 HT MUSCLE IMAGE SPECT MULT: CPT | Performed by: INTERNAL MEDICINE

## 2024-04-30 PROCEDURE — 93016 CV STRESS TEST SUPVJ ONLY: CPT | Performed by: INTERNAL MEDICINE

## 2024-04-30 PROCEDURE — 93018 CV STRESS TEST I&R ONLY: CPT | Performed by: INTERNAL MEDICINE

## 2024-05-01 ENCOUNTER — TELEPHONE (OUTPATIENT)
Dept: CARDIOLOGY CLINIC | Age: 69
End: 2024-05-01

## 2024-05-02 DIAGNOSIS — I10 ESSENTIAL HYPERTENSION: ICD-10-CM

## 2024-05-02 DIAGNOSIS — R00.2 PALPITATIONS: ICD-10-CM

## 2024-05-02 DIAGNOSIS — Z95.818 STATUS POST PLACEMENT OF IMPLANTABLE LOOP RECORDER: Primary | ICD-10-CM

## 2024-05-02 PROCEDURE — 93298 REM INTERROG DEV EVAL SCRMS: CPT | Performed by: INTERNAL MEDICINE

## 2024-05-07 DIAGNOSIS — R00.2 PALPITATIONS: ICD-10-CM

## 2024-05-07 DIAGNOSIS — Z95.818 STATUS POST PLACEMENT OF IMPLANTABLE LOOP RECORDER: Primary | ICD-10-CM

## 2024-05-21 RX ORDER — ATENOLOL 25 MG/1
25 TABLET ORAL DAILY
Qty: 60 TABLET | Refills: 0 | OUTPATIENT
Start: 2024-05-21

## 2024-06-17 DIAGNOSIS — Z95.818 STATUS POST PLACEMENT OF IMPLANTABLE LOOP RECORDER: Primary | ICD-10-CM

## 2024-06-17 DIAGNOSIS — I48.0 PAF (PAROXYSMAL ATRIAL FIBRILLATION) (HCC): ICD-10-CM

## 2024-06-17 DIAGNOSIS — R00.2 PALPITATIONS: ICD-10-CM

## 2024-07-24 ENCOUNTER — OFFICE VISIT (OUTPATIENT)
Dept: CARDIOLOGY CLINIC | Age: 69
End: 2024-07-24
Payer: MEDICARE

## 2024-07-24 VITALS
HEART RATE: 58 BPM | HEIGHT: 65 IN | DIASTOLIC BLOOD PRESSURE: 70 MMHG | WEIGHT: 209 LBS | SYSTOLIC BLOOD PRESSURE: 138 MMHG | BODY MASS INDEX: 34.82 KG/M2

## 2024-07-24 DIAGNOSIS — Z95.818 IMPLANTABLE LOOP RECORDER PRESENT: ICD-10-CM

## 2024-07-24 DIAGNOSIS — Z45.09 ENCOUNTER FOR LOOP RECORDER CHECK: Primary | ICD-10-CM

## 2024-07-24 DIAGNOSIS — I48.0 PAF (PAROXYSMAL ATRIAL FIBRILLATION) (HCC): Primary | ICD-10-CM

## 2024-07-24 DIAGNOSIS — R00.2 PALPITATIONS: ICD-10-CM

## 2024-07-24 PROCEDURE — G8417 CALC BMI ABV UP PARAM F/U: HCPCS | Performed by: INTERNAL MEDICINE

## 2024-07-24 PROCEDURE — 1123F ACP DISCUSS/DSCN MKR DOCD: CPT | Performed by: INTERNAL MEDICINE

## 2024-07-24 PROCEDURE — 99214 OFFICE O/P EST MOD 30 MIN: CPT | Performed by: INTERNAL MEDICINE

## 2024-07-24 PROCEDURE — 3075F SYST BP GE 130 - 139MM HG: CPT | Performed by: INTERNAL MEDICINE

## 2024-07-24 PROCEDURE — 93298 REM INTERROG DEV EVAL SCRMS: CPT | Performed by: INTERNAL MEDICINE

## 2024-07-24 PROCEDURE — G8427 DOCREV CUR MEDS BY ELIG CLIN: HCPCS | Performed by: INTERNAL MEDICINE

## 2024-07-24 PROCEDURE — 1036F TOBACCO NON-USER: CPT | Performed by: INTERNAL MEDICINE

## 2024-07-24 PROCEDURE — 1090F PRES/ABSN URINE INCON ASSESS: CPT | Performed by: INTERNAL MEDICINE

## 2024-07-24 PROCEDURE — 3078F DIAST BP <80 MM HG: CPT | Performed by: INTERNAL MEDICINE

## 2024-07-24 PROCEDURE — G8400 PT W/DXA NO RESULTS DOC: HCPCS | Performed by: INTERNAL MEDICINE

## 2024-07-24 PROCEDURE — 3017F COLORECTAL CA SCREEN DOC REV: CPT | Performed by: INTERNAL MEDICINE

## 2024-07-24 PROCEDURE — 93000 ELECTROCARDIOGRAM COMPLETE: CPT | Performed by: INTERNAL MEDICINE

## 2024-07-24 ASSESSMENT — ENCOUNTER SYMPTOMS
BACK PAIN: 0
COUGH: 0
EYE DISCHARGE: 0
VOMITING: 0
ABDOMINAL DISTENTION: 0
BLOOD IN STOOL: 0
DIARRHEA: 0
SHORTNESS OF BREATH: 0
CONSTIPATION: 0
WHEEZING: 0

## 2024-07-24 NOTE — PROGRESS NOTES
Remote monitoring for follow up on patient's implantable loop recorder -summary report.  Battery ok  Observations:  1280 bradycardia episodes mostly occurring between 6 am and 7am  Next office visit:  7/24/24  Next remote check 8/26/24

## 2024-07-24 NOTE — PROGRESS NOTES
[facility]  Cardiology Office Note  [address], [facility]  [address]  Phone: [phone]  Fax: [phone]                            Date:  7/24/2024  Patient: Emerita Orourke  Age:  68 y.o., 1955    Referral: No ref. provider found      PROBLEM LIST:    Patient Active Problem List    Diagnosis Date Noted    Atrial fibrillation with RVR ([facility]) 03/17/2018     Priority: High    Essential hypertension      Priority: High    Rhinorrhea      Priority: High    Abnormal stress echo      Priority: High    Precordial pain      Priority: High    Atrial fibrillation ([facility]) 09/20/2021     Priority: Low    Hyperglycemia 09/19/2021     Priority: Low    Chronic anticoagulation 07/29/2020     Priority: Low    PAF (paroxysmal atrial fibrillation) ([facility]) 05/25/2017     Priority: Low    Hyperlipidemia 03/17/2015     Priority: Low    Acquired hypothyroidism 02/25/2015     Priority: Low     1.  Paroxysmal atrial fibrillation previously on Betapace (held for bradycardia) with recurrent atrial fibrillation episode 9/2021, on Eliquis, restarted Betapace, on Eliquis, ILR placement 9/21/2021, normal LV systolic function (echo 9/18/2021).  2.  Hypertension.  3.  Obstructive sleep apnea with CPAP utilization.    PRESENTATION: Emerita Orourke is a 68 y.o. year old female presents for follow-up evaluation.  Since her last visit she was apparently on Rythmol briefly and this was then switched back to Betapace as she was having episodes of atrial fibrillation.  Since being on Betapace at 80 mg twice daily she has had no further events of atrial fibrillation.  She does have an ILR which does show bradycardia that occurs between 6 and 7 AM for unclear reasons.  She is usually just up at that time.  She takes her Betapace at 9 AM.  Second dose was at 9 PM.  She does not report any lightheadedness or syncopal episodes.  She does report episodic left jaw pain that goes into a year that occurs at

## 2024-08-12 RX ORDER — SOTALOL HYDROCHLORIDE 80 MG/1
80 TABLET ORAL 2 TIMES DAILY
Qty: 180 TABLET | Refills: 1 | Status: SHIPPED | OUTPATIENT
Start: 2024-08-12

## 2024-08-31 ENCOUNTER — APPOINTMENT (OUTPATIENT)
Dept: GENERAL RADIOLOGY | Age: 69
DRG: 310 | End: 2024-08-31
Payer: MEDICARE

## 2024-08-31 ENCOUNTER — HOSPITAL ENCOUNTER (INPATIENT)
Age: 69
LOS: 1 days | Discharge: HOME | DRG: 310 | End: 2024-09-02
Attending: PEDIATRICS | Admitting: STUDENT IN AN ORGANIZED HEALTH CARE EDUCATION/TRAINING PROGRAM
Payer: MEDICARE

## 2024-08-31 DIAGNOSIS — I48.91 ATRIAL FIBRILLATION WITH RAPID VENTRICULAR RESPONSE (HCC): Primary | ICD-10-CM

## 2024-08-31 DIAGNOSIS — I48.11 LONGSTANDING PERSISTENT ATRIAL FIBRILLATION (HCC): ICD-10-CM

## 2024-08-31 LAB
ALBUMIN SERPL-MCNC: 4.1 G/DL (ref 3.5–5.2)
ALP SERPL-CCNC: 138 U/L (ref 35–104)
ALT SERPL-CCNC: 31 U/L (ref 5–33)
ANION GAP SERPL CALCULATED.3IONS-SCNC: 14 MMOL/L (ref 7–19)
AST SERPL-CCNC: 36 U/L (ref 5–32)
BASOPHILS # BLD: 0 K/UL (ref 0–0.2)
BASOPHILS NFR BLD: 0.4 % (ref 0–1)
BILIRUB SERPL-MCNC: 0.3 MG/DL (ref 0.2–1.2)
BUN SERPL-MCNC: 20 MG/DL (ref 8–23)
CALCIUM SERPL-MCNC: 9.4 MG/DL (ref 8.8–10.2)
CHLORIDE SERPL-SCNC: 105 MMOL/L (ref 98–111)
CO2 SERPL-SCNC: 22 MMOL/L (ref 22–29)
CREAT SERPL-MCNC: 0.5 MG/DL (ref 0.5–0.9)
EOSINOPHIL # BLD: 0.1 K/UL (ref 0–0.6)
EOSINOPHIL NFR BLD: 1.6 % (ref 0–5)
ERYTHROCYTE [DISTWIDTH] IN BLOOD BY AUTOMATED COUNT: 12.3 % (ref 11.5–14.5)
GLUCOSE SERPL-MCNC: 181 MG/DL (ref 70–99)
HCT VFR BLD AUTO: 42.4 % (ref 37–47)
HGB BLD-MCNC: 14 G/DL (ref 12–16)
IMM GRANULOCYTES # BLD: 0 K/UL
LYMPHOCYTES # BLD: 2.3 K/UL (ref 1.1–4.5)
LYMPHOCYTES NFR BLD: 29.5 % (ref 20–40)
MAGNESIUM SERPL-MCNC: 1.8 MG/DL (ref 1.6–2.4)
MCH RBC QN AUTO: 30.3 PG (ref 27–31)
MCHC RBC AUTO-ENTMCNC: 33 G/DL (ref 33–37)
MCV RBC AUTO: 91.8 FL (ref 81–99)
MONOCYTES # BLD: 0.8 K/UL (ref 0–0.9)
MONOCYTES NFR BLD: 10.3 % (ref 0–10)
NEUTROPHILS # BLD: 4.6 K/UL (ref 1.5–7.5)
NEUTS SEG NFR BLD: 57.8 % (ref 50–65)
PLATELET # BLD AUTO: 194 K/UL (ref 130–400)
PMV BLD AUTO: 10.3 FL (ref 9.4–12.3)
POTASSIUM SERPL-SCNC: 4 MMOL/L (ref 3.5–5)
PROT SERPL-MCNC: 7.3 G/DL (ref 6.4–8.3)
RBC # BLD AUTO: 4.62 M/UL (ref 4.2–5.4)
SODIUM SERPL-SCNC: 141 MMOL/L (ref 136–145)
TROPONIN, HIGH SENSITIVITY: 13 NG/L (ref 0–14)
WBC # BLD AUTO: 7.9 K/UL (ref 4.8–10.8)

## 2024-08-31 PROCEDURE — 83036 HEMOGLOBIN GLYCOSYLATED A1C: CPT

## 2024-08-31 PROCEDURE — 84439 ASSAY OF FREE THYROXINE: CPT

## 2024-08-31 PROCEDURE — 96365 THER/PROPH/DIAG IV INF INIT: CPT

## 2024-08-31 PROCEDURE — 2580000003 HC RX 258: Performed by: PEDIATRICS

## 2024-08-31 PROCEDURE — 84484 ASSAY OF TROPONIN QUANT: CPT

## 2024-08-31 PROCEDURE — 2500000003 HC RX 250 WO HCPCS: Performed by: PEDIATRICS

## 2024-08-31 PROCEDURE — 80053 COMPREHEN METABOLIC PANEL: CPT

## 2024-08-31 PROCEDURE — 36415 COLL VENOUS BLD VENIPUNCTURE: CPT

## 2024-08-31 PROCEDURE — 93005 ELECTROCARDIOGRAM TRACING: CPT | Performed by: PEDIATRICS

## 2024-08-31 PROCEDURE — 84443 ASSAY THYROID STIM HORMONE: CPT

## 2024-08-31 PROCEDURE — 71045 X-RAY EXAM CHEST 1 VIEW: CPT

## 2024-08-31 PROCEDURE — 83735 ASSAY OF MAGNESIUM: CPT

## 2024-08-31 PROCEDURE — 85025 COMPLETE CBC W/AUTO DIFF WBC: CPT

## 2024-08-31 PROCEDURE — 99285 EMERGENCY DEPT VISIT HI MDM: CPT

## 2024-08-31 RX ORDER — DILTIAZEM HYDROCHLORIDE 5 MG/ML
10 INJECTION INTRAVENOUS ONCE
Status: COMPLETED | OUTPATIENT
Start: 2024-08-31 | End: 2024-08-31

## 2024-08-31 RX ADMIN — DILTIAZEM HYDROCHLORIDE 10 MG: 5 INJECTION, SOLUTION INTRAVENOUS at 22:49

## 2024-08-31 RX ADMIN — DILTIAZEM HYDROCHLORIDE 2.5 MG/HR: 5 INJECTION, SOLUTION INTRAVENOUS at 23:09

## 2024-09-01 ENCOUNTER — HOSPITAL ENCOUNTER (INPATIENT)
Age: 69
Discharge: HOME | DRG: 310 | End: 2024-09-03
Attending: STUDENT IN AN ORGANIZED HEALTH CARE EDUCATION/TRAINING PROGRAM
Payer: MEDICARE

## 2024-09-01 VITALS
DIASTOLIC BLOOD PRESSURE: 84 MMHG | HEIGHT: 65 IN | SYSTOLIC BLOOD PRESSURE: 153 MMHG | BODY MASS INDEX: 33.82 KG/M2 | WEIGHT: 203 LBS

## 2024-09-01 PROBLEM — I48.91 A-FIB (HCC): Status: ACTIVE | Noted: 2024-09-01

## 2024-09-01 LAB
ALBUMIN SERPL-MCNC: 4.1 G/DL (ref 3.5–5.2)
ALP SERPL-CCNC: 120 U/L (ref 35–104)
ALT SERPL-CCNC: 22 U/L (ref 5–33)
ANION GAP SERPL CALCULATED.3IONS-SCNC: 13 MMOL/L (ref 7–19)
AST SERPL-CCNC: 20 U/L (ref 5–32)
BASOPHILS # BLD: 0 K/UL (ref 0–0.2)
BASOPHILS NFR BLD: 0.6 % (ref 0–1)
BILIRUB SERPL-MCNC: 0.4 MG/DL (ref 0.2–1.2)
BUN SERPL-MCNC: 17 MG/DL (ref 8–23)
CALCIUM SERPL-MCNC: 9.4 MG/DL (ref 8.8–10.2)
CHLORIDE SERPL-SCNC: 106 MMOL/L (ref 98–111)
CO2 SERPL-SCNC: 24 MMOL/L (ref 22–29)
CREAT SERPL-MCNC: 0.4 MG/DL (ref 0.5–0.9)
ECHO AO ASC DIAM: 3.2 CM
ECHO AO ASCENDING AORTA INDEX: 1.61 CM/M2
ECHO AO ROOT DIAM: 2.5 CM
ECHO AO ROOT INDEX: 1.26 CM/M2
ECHO AO SINUS VALSALVA DIAM: 3.6 CM
ECHO AO SINUS VALSALVA INDEX: 1.81 CM/M2
ECHO AO ST JNCT DIAM: 2.9 CM
ECHO AV AREA PEAK VELOCITY: 3.2 CM2
ECHO AV AREA VTI: 3.1 CM2
ECHO AV AREA/BSA PEAK VELOCITY: 1.6 CM2/M2
ECHO AV AREA/BSA VTI: 1.6 CM2/M2
ECHO AV MEAN GRADIENT: 3 MMHG
ECHO AV MEAN VELOCITY: 0.8 M/S
ECHO AV PEAK GRADIENT: 4 MMHG
ECHO AV PEAK VELOCITY: 1 M/S
ECHO AV VELOCITY RATIO: 0.9
ECHO AV VTI: 19.1 CM
ECHO BSA: 2.05 M2
ECHO EST RA PRESSURE: 3 MMHG
ECHO IVC PROX: 1.4 CM
ECHO LA AREA 2C: 21.9 CM2
ECHO LA AREA 4C: 23.9 CM2
ECHO LA DIAMETER INDEX: 1.71 CM/M2
ECHO LA DIAMETER: 3.4 CM
ECHO LA MAJOR AXIS: 6.9 CM
ECHO LA MINOR AXIS: 5.8 CM
ECHO LA TO AORTIC ROOT RATIO: 1.36
ECHO LA VOL BP: 75 ML (ref 22–52)
ECHO LA VOL MOD A2C: 68 ML (ref 22–52)
ECHO LA VOL MOD A4C: 70 ML (ref 22–52)
ECHO LA VOL/BSA BIPLANE: 38 ML/M2 (ref 16–34)
ECHO LA VOLUME INDEX MOD A2C: 34 ML/M2 (ref 16–34)
ECHO LA VOLUME INDEX MOD A4C: 35 ML/M2 (ref 16–34)
ECHO LV E' LATERAL VELOCITY: 14 CM/S
ECHO LV E' SEPTAL VELOCITY: 8 CM/S
ECHO LV EDV A2C: 67 ML
ECHO LV EDV A4C: 66 ML
ECHO LV EDV INDEX A4C: 33 ML/M2
ECHO LV EDV NDEX A2C: 34 ML/M2
ECHO LV EJECTION FRACTION A2C: 65 %
ECHO LV EJECTION FRACTION A4C: 65 %
ECHO LV EJECTION FRACTION BIPLANE: 65 % (ref 55–100)
ECHO LV ESV A2C: 24 ML
ECHO LV ESV A4C: 23 ML
ECHO LV ESV INDEX A2C: 12 ML/M2
ECHO LV ESV INDEX A4C: 12 ML/M2
ECHO LV FRACTIONAL SHORTENING: 32 % (ref 28–44)
ECHO LV INTERNAL DIMENSION DIASTOLE INDEX: 2.06 CM/M2
ECHO LV INTERNAL DIMENSION DIASTOLIC: 4.1 CM (ref 3.9–5.3)
ECHO LV INTERNAL DIMENSION SYSTOLIC INDEX: 1.41 CM/M2
ECHO LV INTERNAL DIMENSION SYSTOLIC: 2.8 CM
ECHO LV ISOVOLUMETRIC RELAXATION TIME (IVRT): 113 MS
ECHO LV IVSD: 1 CM (ref 0.6–0.9)
ECHO LV MASS 2D: 141.5 G (ref 67–162)
ECHO LV MASS INDEX 2D: 71.1 G/M2 (ref 43–95)
ECHO LV POSTERIOR WALL DIASTOLIC: 1.1 CM (ref 0.6–0.9)
ECHO LV RELATIVE WALL THICKNESS RATIO: 0.54
ECHO LVOT AREA: 3.5 CM2
ECHO LVOT AV VTI INDEX: 0.9
ECHO LVOT DIAM: 2.1 CM
ECHO LVOT MEAN GRADIENT: 2 MMHG
ECHO LVOT PEAK GRADIENT: 3 MMHG
ECHO LVOT PEAK VELOCITY: 0.9 M/S
ECHO LVOT STROKE VOLUME INDEX: 29.9 ML/M2
ECHO LVOT SV: 59.5 ML
ECHO LVOT VTI: 17.2 CM
ECHO MV A VELOCITY: 0.29 M/S
ECHO MV ANNULUS DIAMETER: 3.9 CM
ECHO MV AREA VTI: 4.1 CM2
ECHO MV E DECELERATION TIME (DT): 242 MS
ECHO MV E VELOCITY: 0.79 M/S
ECHO MV E/A RATIO: 2.72
ECHO MV E/E' LATERAL: 5.64
ECHO MV E/E' RATIO (AVERAGED): 7.76
ECHO MV E/E' SEPTAL: 9.88
ECHO MV LVOT VTI INDEX: 0.85
ECHO MV MAX VELOCITY: 0.6 M/S
ECHO MV MEAN GRADIENT: 1 MMHG
ECHO MV MEAN VELOCITY: 0.4 M/S
ECHO MV PEAK GRADIENT: 1 MMHG
ECHO MV VTI: 14.6 CM
ECHO RA AREA 4C: 18.4 CM2
ECHO RA END SYSTOLIC VOLUME APICAL 4 CHAMBER INDEX BSA: 24 ML/M2
ECHO RA MAJOR AXIS INDEX: 2.81 CM/M2
ECHO RA MAJOR AXIS: 5.6 CM
ECHO RA MINOR AXIS INDEX: 2.11 CM/M2
ECHO RA MINOR AXIS: 4.2 CM
ECHO RA VOLUME: 48 ML
ECHO RIGHT VENTRICULAR SYSTOLIC PRESSURE (RVSP): 25 MMHG
ECHO RV BASAL DIMENSION: 2.9 CM
ECHO RV INTERNAL DIMENSION: 3.3 CM
ECHO RV LONGITUDINAL DIMENSION: 7.5 CM
ECHO RV MID DIMENSION: 3.1 CM
ECHO RV TAPSE: 1.9 CM (ref 1.7–?)
ECHO TV REGURGITANT MAX VELOCITY: 2.35 M/S
ECHO TV REGURGITANT PEAK GRADIENT: 22 MMHG
EOSINOPHIL # BLD: 0.1 K/UL (ref 0–0.6)
EOSINOPHIL NFR BLD: 1.3 % (ref 0–5)
ERYTHROCYTE [DISTWIDTH] IN BLOOD BY AUTOMATED COUNT: 12.3 % (ref 11.5–14.5)
GLUCOSE BLD-MCNC: 151 MG/DL (ref 70–99)
GLUCOSE BLD-MCNC: 96 MG/DL (ref 70–99)
GLUCOSE SERPL-MCNC: 104 MG/DL (ref 70–99)
HBA1C MFR BLD: 5.5 % (ref 4–5.6)
HCT VFR BLD AUTO: 40.2 % (ref 37–47)
HGB BLD-MCNC: 13.4 G/DL (ref 12–16)
IMM GRANULOCYTES # BLD: 0 K/UL
LYMPHOCYTES # BLD: 2.6 K/UL (ref 1.1–4.5)
LYMPHOCYTES NFR BLD: 37.6 % (ref 20–40)
MCH RBC QN AUTO: 30.5 PG (ref 27–31)
MCHC RBC AUTO-ENTMCNC: 33.3 G/DL (ref 33–37)
MCV RBC AUTO: 91.6 FL (ref 81–99)
MONOCYTES # BLD: 0.9 K/UL (ref 0–0.9)
MONOCYTES NFR BLD: 12.3 % (ref 0–10)
NEUTROPHILS # BLD: 3.3 K/UL (ref 1.5–7.5)
NEUTS SEG NFR BLD: 47.9 % (ref 50–65)
PERFORMED ON: ABNORMAL
PERFORMED ON: NORMAL
PLATELET # BLD AUTO: 183 K/UL (ref 130–400)
PMV BLD AUTO: 10.3 FL (ref 9.4–12.3)
POTASSIUM SERPL-SCNC: 3.7 MMOL/L (ref 3.5–5)
PROT SERPL-MCNC: 6.2 G/DL (ref 6.4–8.3)
RBC # BLD AUTO: 4.39 M/UL (ref 4.2–5.4)
SODIUM SERPL-SCNC: 143 MMOL/L (ref 136–145)
T4 FREE SERPL-MCNC: 2.04 NG/DL (ref 0.93–1.7)
TROPONIN, HIGH SENSITIVITY: 16 NG/L (ref 0–14)
TSH SERPL DL<=0.005 MIU/L-ACNC: 0.08 UIU/ML (ref 0.27–4.2)
WBC # BLD AUTO: 6.9 K/UL (ref 4.8–10.8)

## 2024-09-01 PROCEDURE — 36415 COLL VENOUS BLD VENIPUNCTURE: CPT

## 2024-09-01 PROCEDURE — 2580000003 HC RX 258: Performed by: PEDIATRICS

## 2024-09-01 PROCEDURE — 6370000000 HC RX 637 (ALT 250 FOR IP): Performed by: HOSPITALIST

## 2024-09-01 PROCEDURE — 6370000000 HC RX 637 (ALT 250 FOR IP): Performed by: INTERNAL MEDICINE

## 2024-09-01 PROCEDURE — 96366 THER/PROPH/DIAG IV INF ADDON: CPT

## 2024-09-01 PROCEDURE — 85025 COMPLETE CBC W/AUTO DIFF WBC: CPT

## 2024-09-01 PROCEDURE — 2500000003 HC RX 250 WO HCPCS: Performed by: PEDIATRICS

## 2024-09-01 PROCEDURE — 80053 COMPREHEN METABOLIC PANEL: CPT

## 2024-09-01 PROCEDURE — 2140000000 HC CCU INTERMEDIATE R&B

## 2024-09-01 PROCEDURE — 6370000000 HC RX 637 (ALT 250 FOR IP): Performed by: STUDENT IN AN ORGANIZED HEALTH CARE EDUCATION/TRAINING PROGRAM

## 2024-09-01 PROCEDURE — 84484 ASSAY OF TROPONIN QUANT: CPT

## 2024-09-01 PROCEDURE — 2580000003 HC RX 258: Performed by: STUDENT IN AN ORGANIZED HEALTH CARE EDUCATION/TRAINING PROGRAM

## 2024-09-01 PROCEDURE — 99222 1ST HOSP IP/OBS MODERATE 55: CPT | Performed by: INTERNAL MEDICINE

## 2024-09-01 PROCEDURE — 93306 TTE W/DOPPLER COMPLETE: CPT | Performed by: INTERNAL MEDICINE

## 2024-09-01 PROCEDURE — 82962 GLUCOSE BLOOD TEST: CPT

## 2024-09-01 PROCEDURE — 93306 TTE W/DOPPLER COMPLETE: CPT

## 2024-09-01 RX ORDER — DEXTROSE MONOHYDRATE 100 MG/ML
INJECTION, SOLUTION INTRAVENOUS CONTINUOUS PRN
Status: DISCONTINUED | OUTPATIENT
Start: 2024-09-01 | End: 2024-09-02 | Stop reason: HOSPADM

## 2024-09-01 RX ORDER — ACETAMINOPHEN 650 MG/1
650 SUPPOSITORY RECTAL EVERY 6 HOURS PRN
Status: DISCONTINUED | OUTPATIENT
Start: 2024-09-01 | End: 2024-09-02 | Stop reason: HOSPADM

## 2024-09-01 RX ORDER — POLYETHYLENE GLYCOL 3350 17 G/17G
17 POWDER, FOR SOLUTION ORAL DAILY PRN
Status: DISCONTINUED | OUTPATIENT
Start: 2024-09-01 | End: 2024-09-02 | Stop reason: HOSPADM

## 2024-09-01 RX ORDER — ASCORBIC ACID 500 MG
250 TABLET ORAL DAILY
Status: DISCONTINUED | OUTPATIENT
Start: 2024-09-01 | End: 2024-09-02 | Stop reason: HOSPADM

## 2024-09-01 RX ORDER — ACETAMINOPHEN 325 MG/1
650 TABLET ORAL EVERY 6 HOURS PRN
Status: DISCONTINUED | OUTPATIENT
Start: 2024-09-01 | End: 2024-09-02 | Stop reason: HOSPADM

## 2024-09-01 RX ORDER — DILTIAZEM HYDROCHLORIDE 120 MG/1
120 CAPSULE, COATED, EXTENDED RELEASE ORAL 2 TIMES DAILY
Status: DISCONTINUED | OUTPATIENT
Start: 2024-09-01 | End: 2024-09-02 | Stop reason: ALTCHOICE

## 2024-09-01 RX ORDER — CALCIUM CARBONATE 500(1250)
500 TABLET ORAL DAILY
Status: DISCONTINUED | OUTPATIENT
Start: 2024-09-01 | End: 2024-09-02 | Stop reason: HOSPADM

## 2024-09-01 RX ORDER — LEVOTHYROXINE SODIUM 100 UG/1
200 TABLET ORAL DAILY
Status: DISCONTINUED | OUTPATIENT
Start: 2024-09-01 | End: 2024-09-01

## 2024-09-01 RX ORDER — FAMOTIDINE 20 MG/1
40 TABLET, FILM COATED ORAL DAILY
Status: DISCONTINUED | OUTPATIENT
Start: 2024-09-01 | End: 2024-09-02 | Stop reason: HOSPADM

## 2024-09-01 RX ORDER — SODIUM CHLORIDE 9 MG/ML
INJECTION, SOLUTION INTRAVENOUS PRN
Status: DISCONTINUED | OUTPATIENT
Start: 2024-09-01 | End: 2024-09-02 | Stop reason: HOSPADM

## 2024-09-01 RX ORDER — SODIUM CHLORIDE, SODIUM LACTATE, POTASSIUM CHLORIDE, CALCIUM CHLORIDE 600; 310; 30; 20 MG/100ML; MG/100ML; MG/100ML; MG/100ML
INJECTION, SOLUTION INTRAVENOUS CONTINUOUS
Status: DISCONTINUED | OUTPATIENT
Start: 2024-09-01 | End: 2024-09-02 | Stop reason: HOSPADM

## 2024-09-01 RX ORDER — INSULIN LISPRO 100 [IU]/ML
0-4 INJECTION, SOLUTION INTRAVENOUS; SUBCUTANEOUS NIGHTLY
Status: DISCONTINUED | OUTPATIENT
Start: 2024-09-01 | End: 2024-09-02 | Stop reason: ALTCHOICE

## 2024-09-01 RX ORDER — SOTALOL HYDROCHLORIDE 80 MG/1
80 TABLET ORAL 2 TIMES DAILY
Status: DISCONTINUED | OUTPATIENT
Start: 2024-09-01 | End: 2024-09-02 | Stop reason: HOSPADM

## 2024-09-01 RX ORDER — ONDANSETRON 4 MG/1
4 TABLET, ORALLY DISINTEGRATING ORAL EVERY 8 HOURS PRN
Status: DISCONTINUED | OUTPATIENT
Start: 2024-09-01 | End: 2024-09-02 | Stop reason: HOSPADM

## 2024-09-01 RX ORDER — INSULIN LISPRO 100 [IU]/ML
0-4 INJECTION, SOLUTION INTRAVENOUS; SUBCUTANEOUS
Status: DISCONTINUED | OUTPATIENT
Start: 2024-09-01 | End: 2024-09-02 | Stop reason: ALTCHOICE

## 2024-09-01 RX ORDER — LOSARTAN POTASSIUM 25 MG/1
50 TABLET ORAL DAILY
Status: DISCONTINUED | OUTPATIENT
Start: 2024-09-01 | End: 2024-09-02 | Stop reason: HOSPADM

## 2024-09-01 RX ORDER — SPIRONOLACTONE 25 MG/1
50 TABLET ORAL DAILY
Status: DISCONTINUED | OUTPATIENT
Start: 2024-09-01 | End: 2024-09-02 | Stop reason: HOSPADM

## 2024-09-01 RX ORDER — SODIUM CHLORIDE 0.9 % (FLUSH) 0.9 %
5-40 SYRINGE (ML) INJECTION EVERY 12 HOURS SCHEDULED
Status: DISCONTINUED | OUTPATIENT
Start: 2024-09-01 | End: 2024-09-02 | Stop reason: HOSPADM

## 2024-09-01 RX ORDER — SODIUM CHLORIDE 0.9 % (FLUSH) 0.9 %
5-40 SYRINGE (ML) INJECTION PRN
Status: DISCONTINUED | OUTPATIENT
Start: 2024-09-01 | End: 2024-09-02 | Stop reason: HOSPADM

## 2024-09-01 RX ORDER — ONDANSETRON 2 MG/ML
4 INJECTION INTRAMUSCULAR; INTRAVENOUS EVERY 6 HOURS PRN
Status: DISCONTINUED | OUTPATIENT
Start: 2024-09-01 | End: 2024-09-02 | Stop reason: HOSPADM

## 2024-09-01 RX ADMIN — OXYCODONE HYDROCHLORIDE AND ACETAMINOPHEN 250 MG: 500 TABLET ORAL at 10:02

## 2024-09-01 RX ADMIN — APIXABAN 5 MG: 5 TABLET, FILM COATED ORAL at 22:32

## 2024-09-01 RX ADMIN — SOTALOL HYDROCHLORIDE 80 MG: 80 TABLET ORAL at 10:03

## 2024-09-01 RX ADMIN — SODIUM CHLORIDE, POTASSIUM CHLORIDE, SODIUM LACTATE AND CALCIUM CHLORIDE: 600; 310; 30; 20 INJECTION, SOLUTION INTRAVENOUS at 03:46

## 2024-09-01 RX ADMIN — DILTIAZEM HYDROCHLORIDE 120 MG: 120 CAPSULE, COATED, EXTENDED RELEASE ORAL at 12:50

## 2024-09-01 RX ADMIN — APIXABAN 5 MG: 5 TABLET, FILM COATED ORAL at 10:03

## 2024-09-01 RX ADMIN — SOTALOL HYDROCHLORIDE 80 MG: 80 TABLET ORAL at 22:33

## 2024-09-01 RX ADMIN — DILTIAZEM HYDROCHLORIDE 5 MG/HR: 5 INJECTION, SOLUTION INTRAVENOUS at 03:53

## 2024-09-01 RX ADMIN — FAMOTIDINE 40 MG: 20 TABLET ORAL at 10:02

## 2024-09-01 RX ADMIN — DILTIAZEM HYDROCHLORIDE 7.5 MG/HR: 5 INJECTION, SOLUTION INTRAVENOUS at 03:46

## 2024-09-01 RX ADMIN — CALCIUM 500 MG: 500 TABLET ORAL at 10:03

## 2024-09-01 ASSESSMENT — PAIN SCALES - GENERAL
PAINLEVEL_OUTOF10: 0
PAINLEVEL_OUTOF10: 0

## 2024-09-01 NOTE — CONSULTS
(9/1/2024)    Hunger Vital Sign     Worried About Running Out of Food in the Last Year: Never true     Ran Out of Food in the Last Year: Never true   Transportation Needs: No Transportation Needs (9/1/2024)    PRAPARE - Transportation     Lack of Transportation (Medical): No     Lack of Transportation (Non-Medical): No   Physical Activity: Not on file   Stress: Not on file   Social Connections: Not on file   Intimate Partner Violence: Not on file   Housing Stability: Low Risk  (9/1/2024)    Housing Stability Vital Sign     Unable to Pay for Housing in the Last Year: No     Number of Times Moved in the Last Year: 1     Homeless in the Last Year: No       Family History:       Problem Relation Age of Onset    Heart Attack Mother     High Blood Pressure Father     Pacemaker Father     Diabetes Sister     High Blood Pressure Sister        Home Meds:  Prior to Admission medications    Medication Sig Start Date End Date Taking? Authorizing Provider   sotalol (BETAPACE) 80 MG tablet Take 1 tablet by mouth twice daily 8/12/24   Kiara Boland APRN   olmesartan (BENICAR) 20 MG tablet Take 1 tablet by mouth 2 times daily 4/22/24   Sloan Malcolm MD   famotidine (PEPCID) 40 MG tablet Take 1 tablet by mouth daily 1/10/24   Hallie Silva MD   levothyroxine (SYNTHROID) 200 MCG tablet Take 1 tablet by mouth Daily    Hallie Silva MD   cloNIDine (CATAPRES) 0.1 MG tablet Take 1 tablet by mouth 2 times daily as needed for High Blood Pressure (for blood pressure > 180/100) 11/1/22   Maureen Hancock APRN   Ascorbic Acid (VITAMIN C) 250 MG tablet Take 1 tablet by mouth daily    Hallie Silva MD   apixaban (ELIQUIS) 5 MG TABS tablet Take 1 tablet by mouth 2 times daily 10/19/21   Maureen Hancock APRN   Zinc Sulfate 220 (50 Zn) MG TABS Take 1 tablet by mouth daily  Patient not taking: Reported on 9/1/2024    Hallie Silva MD   Cholecalciferol (VITAMIN D3 PO) Take 1,000 mg by mouth daily    Ricardo  heard.     No systolic murmur is present.      No diastolic murmur is present.      No friction rub. No gallop. No S3 or S4 sounds.      Comments: No JVD  No edema    Pulmonary:      Effort: Pulmonary effort is normal. No respiratory distress.      Breath sounds: Normal breath sounds. No wheezing or rales.   Chest:      Chest wall: No tenderness.   Abdominal:      General: Bowel sounds are normal. There is no distension.      Palpations: Abdomen is soft. There is no mass.      Tenderness: There is no abdominal tenderness. There is no guarding or rebound.      Hernia: No hernia is present.      Comments: No palpable organomegaly   Musculoskeletal:         General: Normal range of motion.   Skin:     General: Skin is warm.      Coloration: Skin is not pale.      Findings: No rash.   Neurological:      Mental Status: She is alert and oriented to person, place, and time.      Cranial Nerves: No cranial nerve deficit.      Deep Tendon Reflexes: Reflexes normal.           Labs:  Recent Labs     08/31/24 2216 09/01/24  0459   WBC 7.9 6.9   HGB 14.0 13.4    183       Recent Labs     08/31/24 2216 09/01/24  0459    143   K 4.0 3.7    106   CO2 22 24   BUN 20 17   CREATININE 0.5 0.4*   LABGLOM >90 >90   MG 1.8  --    CALCIUM 9.4 9.4       CK, CKMB, Troponin: @LABRCNT (CKTOTAL:3, CKMB:3, TROPONINI:3)@    Last 3 BNP:          IMAGING:  Echo (TTE) complete (PRN contrast/bubble/strain/3D)    Result Date: 9/1/2024    Left Ventricle: Normal left ventricular systolic function with a visually estimated EF of 60 - 65%. Left ventricle size is normal. Mildly increased wall thickness. Normal wall motion. Indeterminate diastolic function due to arrhythmia.   Right Ventricle: Right ventricle size is normal. Normal systolic function. TAPSE is 1.9 cm.   Aortic Valve: Not well visualized. Appears to be trileaflet valve. No cusp thickening. Trace regurgitation. No stenosis.   Mitral Valve: Trace regurgitation. No stenosis

## 2024-09-01 NOTE — ED PROVIDER NOTES
[facility]  eMERGENCY dEPARTMENT eNCOUnter      Pt Name: Emerita Orourke  MRN: [id number]  Birthdate 1955  Date of evaluation: 8/31/2024  Provider: Margi Mata MD    CHIEF COMPLAINT       Chief Complaint   Patient presents with    Irregular Heart Beat     Feels like her heart is beating hard and erratically for the last hour, hx afib         HISTORY OF PRESENT ILLNESS   (Location/Symptom, Timing/Onset,Context/Setting, Quality, Duration, Modifying Factors, Severity)  Note limiting factors.   Emerita Orourke is a 69 y.o. female who presents to the emergency department with irregular heartbeat.  Patient has a history of atrial fibrillation.  Patient currently takes sotalol and Eliquis.  Patient states that she began having palpitations around 8 PM tonight after eating supper.  Patient states that she is mildly short of breath \"but not really short of breath.\"  Patient denies chest pain, lower extremity edema, lower extremity pain, or black or bloody stools.  Patient's cardiologist is Dr. Kline.    HPI    NursingNotes were reviewed.    REVIEW OF SYSTEMS    (2-9 systems for level 4, 10 or more for level 5)     Review of Systems   Constitutional:  Negative for chills and fever.   HENT:  Negative for congestion and rhinorrhea.    Respiratory:  Positive for shortness of breath (Patient states \"a little but not really.\"). Negative for cough.    Cardiovascular:  Positive for palpitations. Negative for chest pain.   Gastrointestinal:  Negative for abdominal pain, nausea and vomiting.   Genitourinary:  Negative for difficulty urinating and dysuria.   Musculoskeletal:  Negative for back pain and neck pain.   Skin:  Negative for color change and pallor.   Neurological:  Negative for syncope and light-headedness.   Psychiatric/Behavioral:  Negative for agitation and confusion.    All other systems reviewed and are negative.           PAST MEDICALHISTORY     Past Medical History:   Diagnosis Date    Atrial

## 2024-09-01 NOTE — H&P
Hospitalist: History and Physical    Date: 2024 Time: 1:51 AM    Name: Emerita Orourke : 1955 MRN: [id number]    Code Status: Full Code No additional code details    PCP: Rajesh Davis MD    Patient's Chief Complaint Is: Palpitation  HPI: Patient is a 69 y.o. female with a past medical history of AFIB, Hypertension, Hyperlipidemia, right lower extremity DVT, Hyperglycemia and Hypothyroidism .Patient presented to [facility] ED due to palpitation that started at 800 PM at home this night after she had eaten dinner. She endorsed slight shortness of breath. She denied chest pain, fever, nausea, vomiting, diaphoresis, chills, abdominal pain or dysuria.   Upon arrival in ED, she was found to have HR between 105-135. BP was 100/67. She was started on Cardizem drip. HR dipped down between . Cardizem drip was titrated down.   Blood work done in ED unremarkable excpet elevation in AST and Alkaline phosphatase.      Past Medical History:   Diagnosis Date    Atrial fibrillation (HCC)     Disease of blood and blood forming organ     DVT in right leg     Hyperlipidemia 3/17/2015    Hypertension     Hyperthyroidism     Sleep apnea     Umbilical hernia      Past Surgical History:   Procedure Laterality Date    ABDOMEN SURGERY      CARDIAC CATHETERIZATION      COLONOSCOPY      CYSTOSCOPY      HYSTERECTOMY (CERVIX STATUS UNKNOWN)       Family History   Problem Relation Age of Onset    Heart Attack Mother     High Blood Pressure Father     Pacemaker Father     Diabetes Sister     High Blood Pressure Sister      Social History     Socioeconomic History    Marital status:      Spouse name: Not on file    Number of children: Not on file    Years of education: Not on file    Highest education level: Not on file   Occupational History    Not on file   Tobacco Use    Smoking status: Never    Smokeless tobacco: Never   Vaping Use    Vaping status: Never Used   Substance and Sexual Activity    Alcohol use: No

## 2024-09-01 NOTE — ED NOTES
ED TO INPATIENT SBAR HANDOFF    Patient Name: Emerita Orourke   : 1955  69 y.o.   Family/Caregiver Present: No  Code Status Order: Full Code    C-SSRS: Risk of Suicide: No Risk  Sitter No  Restraints:         Situation  Chief Complaint:   Chief Complaint   Patient presents with    Irregular Heart Beat     Feels like her heart is beating hard and erratically for the last hour, hx afib     Patient Diagnosis: A-fib (HCC) [I48.91]     Brief Description of Patient's Condition: Emerita Orourke is a 69 y.o. female who presents to the emergency department with irregular heartbeat.  Patient has a history of atrial fibrillation.  Patient currently takes sotalol and Eliquis.  Patient states that she began having palpitations around 8 PM tonight after eating supper.  Patient states that she is mildly short of breath \"but not really short of breath.\"  Patient denies chest pain, lower extremity edema, lower extremity pain, or black or bloody stools.  Patient's cardiologist is Dr. Kline.      Mental Status: oriented, alert, coherent, logical, thought processes intact, and able to concentrate and follow conversation  Arrived from: home    Imaging:   XR CHEST PORTABLE    (Results Pending)     COVID-19 Results:   Internal Administration   First Dose      Second Dose           Last COVID Lab SARS-CoV-2, NAAT (no units)   Date Value   2021 Not Detected           Abnormal labs:   Abnormal Labs Reviewed   COMPREHENSIVE METABOLIC PANEL - Abnormal; Notable for the following components:       Result Value    Glucose 181 (*)     Alkaline Phosphatase 138 (*)     AST 36 (*)     All other components within normal limits   CBC WITH AUTO DIFFERENTIAL - Abnormal; Notable for the following components:    Monocytes % 10.3 (*)     All other components within normal limits     Background  Allergies: No Known Allergies  Current Medications:   Medications Administered         dilTIAZem 125 mg in sodium chloride 0.9 % 125 mL infusion Admin

## 2024-09-02 VITALS
OXYGEN SATURATION: 98 % | TEMPERATURE: 95.9 F | SYSTOLIC BLOOD PRESSURE: 118 MMHG | HEART RATE: 57 BPM | WEIGHT: 203.19 LBS | HEIGHT: 65 IN | DIASTOLIC BLOOD PRESSURE: 66 MMHG | BODY MASS INDEX: 33.85 KG/M2 | RESPIRATION RATE: 16 BRPM

## 2024-09-02 LAB
ALBUMIN SERPL-MCNC: 3.6 G/DL (ref 3.5–5.2)
ALP SERPL-CCNC: 96 U/L (ref 35–104)
ALT SERPL-CCNC: 20 U/L (ref 5–33)
ANION GAP SERPL CALCULATED.3IONS-SCNC: 12 MMOL/L (ref 7–19)
AST SERPL-CCNC: 19 U/L (ref 5–32)
BASOPHILS # BLD: 0 K/UL (ref 0–0.2)
BASOPHILS NFR BLD: 0.4 % (ref 0–1)
BILIRUB SERPL-MCNC: 0.5 MG/DL (ref 0.2–1.2)
BUN SERPL-MCNC: 25 MG/DL (ref 8–23)
CALCIUM SERPL-MCNC: 9.2 MG/DL (ref 8.8–10.2)
CHLORIDE SERPL-SCNC: 105 MMOL/L (ref 98–111)
CO2 SERPL-SCNC: 22 MMOL/L (ref 22–29)
CREAT SERPL-MCNC: 0.8 MG/DL (ref 0.5–0.9)
EKG P AXIS: 66 DEGREES
EKG P-R INTERVAL: 192 MS
EKG Q-T INTERVAL: 304 MS
EKG QRS DURATION: 108 MS
EKG QTC CALCULATION (BAZETT): 419 MS
EKG T AXIS: 85 DEGREES
EOSINOPHIL # BLD: 0.1 K/UL (ref 0–0.6)
EOSINOPHIL NFR BLD: 1.7 % (ref 0–5)
ERYTHROCYTE [DISTWIDTH] IN BLOOD BY AUTOMATED COUNT: 12.7 % (ref 11.5–14.5)
GLUCOSE BLD-MCNC: 113 MG/DL (ref 70–99)
GLUCOSE SERPL-MCNC: 102 MG/DL (ref 70–99)
HCT VFR BLD AUTO: 39 % (ref 37–47)
HGB BLD-MCNC: 13 G/DL (ref 12–16)
IMM GRANULOCYTES # BLD: 0 K/UL
INR PPP: 1.57 (ref 0.88–1.18)
LYMPHOCYTES # BLD: 3.2 K/UL (ref 1.1–4.5)
LYMPHOCYTES NFR BLD: 40.3 % (ref 20–40)
MCH RBC QN AUTO: 31.3 PG (ref 27–31)
MCHC RBC AUTO-ENTMCNC: 33.3 G/DL (ref 33–37)
MCV RBC AUTO: 93.8 FL (ref 81–99)
MONOCYTES # BLD: 0.9 K/UL (ref 0–0.9)
MONOCYTES NFR BLD: 10.7 % (ref 0–10)
NEUTROPHILS # BLD: 3.7 K/UL (ref 1.5–7.5)
NEUTS SEG NFR BLD: 46.5 % (ref 50–65)
PERFORMED ON: ABNORMAL
PLATELET # BLD AUTO: 178 K/UL (ref 130–400)
PMV BLD AUTO: 11 FL (ref 9.4–12.3)
POTASSIUM SERPL-SCNC: 3.7 MMOL/L (ref 3.5–5)
PROT SERPL-MCNC: 6.1 G/DL (ref 6.4–8.3)
PROTHROMBIN TIME: 18.3 SEC (ref 12–14.6)
RBC # BLD AUTO: 4.16 M/UL (ref 4.2–5.4)
SODIUM SERPL-SCNC: 139 MMOL/L (ref 136–145)
WBC # BLD AUTO: 8 K/UL (ref 4.8–10.8)

## 2024-09-02 PROCEDURE — 82962 GLUCOSE BLOOD TEST: CPT

## 2024-09-02 PROCEDURE — 85610 PROTHROMBIN TIME: CPT

## 2024-09-02 PROCEDURE — 99232 SBSQ HOSP IP/OBS MODERATE 35: CPT | Performed by: INTERNAL MEDICINE

## 2024-09-02 PROCEDURE — 6370000000 HC RX 637 (ALT 250 FOR IP): Performed by: HOSPITALIST

## 2024-09-02 PROCEDURE — 85025 COMPLETE CBC W/AUTO DIFF WBC: CPT

## 2024-09-02 PROCEDURE — 36415 COLL VENOUS BLD VENIPUNCTURE: CPT

## 2024-09-02 PROCEDURE — 6370000000 HC RX 637 (ALT 250 FOR IP): Performed by: INTERNAL MEDICINE

## 2024-09-02 PROCEDURE — 5A09357 ASSISTANCE WITH RESPIRATORY VENTILATION, LESS THAN 24 CONSECUTIVE HOURS, CONTINUOUS POSITIVE AIRWAY PRESSURE: ICD-10-PCS | Performed by: HOSPITALIST

## 2024-09-02 PROCEDURE — 93010 ELECTROCARDIOGRAM REPORT: CPT | Performed by: INTERNAL MEDICINE

## 2024-09-02 PROCEDURE — 80053 COMPREHEN METABOLIC PANEL: CPT

## 2024-09-02 PROCEDURE — 6370000000 HC RX 637 (ALT 250 FOR IP): Performed by: STUDENT IN AN ORGANIZED HEALTH CARE EDUCATION/TRAINING PROGRAM

## 2024-09-02 RX ORDER — LEVOTHYROXINE SODIUM 137 UG/1
137 TABLET ORAL DAILY
Status: DISCONTINUED | OUTPATIENT
Start: 2024-09-02 | End: 2024-09-02 | Stop reason: HOSPADM

## 2024-09-02 RX ORDER — LEVOTHYROXINE SODIUM 137 UG/1
137 TABLET ORAL DAILY
Qty: 30 TABLET | Refills: 3 | Status: SHIPPED | OUTPATIENT
Start: 2024-09-02

## 2024-09-02 RX ADMIN — SOTALOL HYDROCHLORIDE 80 MG: 80 TABLET ORAL at 08:34

## 2024-09-02 RX ADMIN — FAMOTIDINE 40 MG: 20 TABLET ORAL at 08:35

## 2024-09-02 RX ADMIN — LOSARTAN POTASSIUM 50 MG: 25 TABLET, FILM COATED ORAL at 10:03

## 2024-09-02 RX ADMIN — SPIRONOLACTONE 50 MG: 25 TABLET ORAL at 08:35

## 2024-09-02 RX ADMIN — OXYCODONE HYDROCHLORIDE AND ACETAMINOPHEN 250 MG: 500 TABLET ORAL at 08:35

## 2024-09-02 RX ADMIN — APIXABAN 5 MG: 5 TABLET, FILM COATED ORAL at 08:35

## 2024-09-02 RX ADMIN — LEVOTHYROXINE SODIUM 137 MCG: 137 TABLET ORAL at 12:04

## 2024-09-02 RX ADMIN — CALCIUM 500 MG: 500 TABLET ORAL at 08:35

## 2024-09-02 NOTE — PROGRESS NOTES
Sinus dior last pm dipping down into the 30's and 40's during the night, denied any signs or symptoms.

## 2024-09-02 NOTE — PLAN OF CARE
Problem: Discharge Planning  Goal: Discharge to home or other facility with appropriate resources  9/2/2024 1214 by Edwin Garcia RN  Outcome: Adequate for Discharge  9/2/2024 0909 by Dottie Wood RN  Outcome: Progressing  9/2/2024 0831 by Dottie Wood RN  Outcome: Progressing     Problem: Pain  Goal: Verbalizes/displays adequate comfort level or baseline comfort level  9/2/2024 1214 by Edwin Garcia RN  Outcome: Adequate for Discharge  9/2/2024 0909 by Dottie Wood RN  Outcome: Progressing  9/2/2024 0831 by Dottie Wood RN  Outcome: Progressing     Problem: ABCDS Injury Assessment  Goal: Absence of physical injury  9/2/2024 1214 by Edwin Garcia RN  Outcome: Adequate for Discharge  9/2/2024 0909 by Dottie Wood RN  Outcome: Progressing  9/2/2024 0831 by Dottie Wood RN  Outcome: Progressing     Problem: Cardiovascular - Adult  Goal: Maintains optimal cardiac output and hemodynamic stability  9/2/2024 1214 by Edwin Garcia RN  Outcome: Adequate for Discharge  9/2/2024 0909 by Dottie Wood RN  Outcome: Progressing  9/2/2024 0831 by Dottie Wood RN  Outcome: Progressing  Goal: Absence of cardiac dysrhythmias or at baseline  9/2/2024 1214 by Edwin Garcia RN  Outcome: Adequate for Discharge  9/2/2024 0909 by Dottie Wood RN  Outcome: Progressing  9/2/2024 0831 by Dottie Wood RN  Outcome: Progressing     Problem: Metabolic/Fluid and Electrolytes - Adult  Goal: Electrolytes maintained within normal limits  9/2/2024 1214 by Edwin Garcia RN  Outcome: Adequate for Discharge  9/2/2024 0909 by Dottie Wood RN  Outcome: Progressing  9/2/2024 0831 by Dottie Wood RN  Outcome: Progressing

## 2024-09-02 NOTE — PLAN OF CARE
Problem: Discharge Planning  Goal: Discharge to home or other facility with appropriate resources  9/2/2024 0909 by Dottie Wood RN  Outcome: Progressing  9/2/2024 0831 by Dottie Wood RN  Outcome: Progressing     Problem: Pain  Goal: Verbalizes/displays adequate comfort level or baseline comfort level  9/2/2024 0909 by Dottie Wood RN  Outcome: Progressing  9/2/2024 0831 by Dottie Wood RN  Outcome: Progressing     Problem: ABCDS Injury Assessment  Goal: Absence of physical injury  9/2/2024 0909 by Dottie Wood RN  Outcome: Progressing  9/2/2024 0831 by Dottie Wood RN  Outcome: Progressing     Problem: Cardiovascular - Adult  Goal: Maintains optimal cardiac output and hemodynamic stability  9/2/2024 0909 by Dottie Wood RN  Outcome: Progressing  9/2/2024 0831 by Dottie Wood RN  Outcome: Progressing  Goal: Absence of cardiac dysrhythmias or at baseline  9/2/2024 0909 by Dottie Wood RN  Outcome: Progressing  9/2/2024 0831 by Dottie Wood RN  Outcome: Progressing     Problem: Metabolic/Fluid and Electrolytes - Adult  Goal: Electrolytes maintained within normal limits  9/2/2024 0909 by Dottie Wood RN  Outcome: Progressing  9/2/2024 0831 by Dottie Wood RN  Outcome: Progressing

## 2024-09-02 NOTE — PROGRESS NOTES
Cardiology Progress Note Clayton Kline MD      Patient:  Emerita Orourke  [id number]    Patient Active Problem List    Diagnosis Date Noted    Atrial fibrillation with RVR ([facility]) 03/17/2018     Priority: High    Essential hypertension      Priority: High    Rhinorrhea      Priority: High    Abnormal stress echo      Priority: High    Precordial pain      Priority: High    A-fib ([facility]) 09/01/2024     Priority: Low    Atrial fibrillation ([facility]) 09/20/2021     Priority: Low    Hyperglycemia 09/19/2021     Priority: Low    Chronic anticoagulation 07/29/2020     Priority: Low    PAF (paroxysmal atrial fibrillation) ([facility]) 05/25/2017     Priority: Low    Hyperlipidemia 03/17/2015     Priority: Low    Acquired hypothyroidism 02/25/2015     Priority: Low       Admit Date:  8/31/2024    Admission Problem List: Present on Admission:   A-fib ([facility])      Cardiac Specific Data:  Specialty Problems          Cardiology Problems    Precordial pain        Atrial fibrillation with RVR ([facility])        Essential hypertension        Hyperlipidemia        PAF (paroxysmal atrial fibrillation) ([facility])        Atrial fibrillation ([facility])        * (Principal) A-fib ([facility])         1.  Paroxysmal atrial fibrillation previously on Betapace (held for bradycardia) with recurrent atrial fibrillation episode 9/2021, on Eliquis, restarted Betapace, on Eliquis, ILR placement 9/21/2021, normal LV systolic function (echo 9/18/2021).  2.  Hypertension.  3.  Obstructive sleep apnea with CPAP utilization.  4.  Hypothyroidism on supplementation with iatrogenic hyperthyroidism and recurrent atrial fibrillation.    Subjective:  Ms. Orourke reports no significant issues.  No chest pain or shortness of.  Has reverted back to sinus rhythm with bradycardia but asymptomatic.    Objective:   /66   Pulse 57   Temp (!) 95.9 °F (35.5 °C) (Temporal)   Resp 16   Ht 1.651 m (5' 5\")   Wt 92.2 kg (203 lb 3 oz)   SpO2 98%   BMI 33.81 kg/m²       Intake/Output Summary (Last 24  No oropharyngeal exudate.   Eyes:      General: No scleral icterus.        Right eye: No discharge.         Left eye: No discharge.   Neck:      Thyroid: No thyromegaly.      Vascular: No JVD.   Cardiovascular:      Rate and Rhythm: Regular rhythm. Bradycardia present. No extrasystoles are present.     Heart sounds: Normal heart sounds, S1 normal and S2 normal. No murmur heard.     No systolic murmur is present.      No diastolic murmur is present.      No friction rub. No gallop. No S3 or S4 sounds.      Comments: No JVD  No edema    Pulmonary:      Effort: Pulmonary effort is normal. No respiratory distress.      Breath sounds: Normal breath sounds. No wheezing or rales.   Chest:      Chest wall: No tenderness.   Abdominal:      General: Bowel sounds are normal. There is no distension.      Palpations: Abdomen is soft. There is no mass.      Tenderness: There is no abdominal tenderness. There is no guarding or rebound.      Hernia: No hernia is present.      Comments: No palpable organomegaly   Musculoskeletal:         General: Normal range of motion.   Skin:     General: Skin is warm.      Coloration: Skin is not pale.      Findings: No rash.   Neurological:      Mental Status: She is alert and oriented to person, place, and time.      Cranial Nerves: No cranial nerve deficit.      Deep Tendon Reflexes: Reflexes normal.                 Lab Data:  CBC:   Recent Labs     08/31/24 2216 09/01/24 0459 09/02/24 0136   WBC 7.9 6.9 8.0   HGB 14.0 13.4 13.0   HCT 42.4 40.2 39.0   MCV 91.8 91.6 93.8    183 178     BMP:   Recent Labs     08/31/24 2216 09/01/24 0459 09/02/24 0136    143 139   K 4.0 3.7 3.7    106 105   CO2 22 24 22   BUN 20 17 25*   CREATININE 0.5 0.4* 0.8     LIVER PROFILE:   Recent Labs     08/31/24 2216 09/01/24 0459 09/02/24 0136   AST 36* 20 19   ALT 31 22 20   BILITOT 0.3 0.4 0.5   ALKPHOS 138* 120* 96     PT/INR:   Recent Labs     09/02/24 0136   PROTIME 18.3*   INR 1.57*

## 2024-09-02 NOTE — DISCHARGE SUMMARY
Discharge Summary    Date:9/2/2024        Patient Name:Emerita Orourke     YOB: 1955     Age:69 y.o.    Admit Date:8/31/2024   Admission Condition:fair   Discharged Condition:stable  Discharge Date: 09/02/24       Discharge Diagnoses   Principal Problem:    A-fib (HCC)  Resolved Problems:    * No resolved hospital problems. *      Hospital Stay   Narrative of Hospital Course:     69-year-old female with a history of atrial fibrillation on Eliquis, hypertension, hyperlipidemia, right lower extremity DVT, hypothyroidism and hyperglycemia who presented to the hospital 9/1/2024 with concerns of palpitation.  In the emergency room was noted to be in atrial fibrillation with RVR, was initiated on Cardizem drip admitted in-house for further evaluation.  In-house thyroid panel was obtained and patient noted to have elevated T4 with suppressed TSH.  Apparently Synthroid dose was recently increased outpatient.  Synthroid medication was held in house, dose decreased to 137 mcg on discharge. Was seen in house by cardiology, transitioned off Cardizem drip, patient spontaneously converted back to normal sinus rhythm.  Cleared for discharge home by cardiology to follow-up outpatient in 2 to 4 weeks for DCCV.  Echocardiogram obtained in house.      Physical Examination:  General: Well-developed, no acute distress lying comfortably in bed.  HEENT: Atraumatic normocephalic, range of motion normal, no JVD, no tracheal deviation noted.  Cardiac: Normal S1-S2   Respiratory: clear To auscultation bilaterally, no rhonchi or rales, no wheezing  Abdomen: Soft, positive bowel sounds in all quadrants, no distention, nontender to palpation, no rebound noted.    Extremities: no tenderness, no edema, moves all extremities  Psych: Affect normal and good eye contact, behavioral normal.      Consultants:   IP CONSULT TO CARDIOLOGY    Time Spent on Discharge:  35 minutes were spent in patient examination, evaluation, counseling as

## 2024-09-02 NOTE — PLAN OF CARE
Problem: Discharge Planning  Goal: Discharge to home or other facility with appropriate resources  Outcome: Progressing     Problem: Pain  Goal: Verbalizes/displays adequate comfort level or baseline comfort level  Outcome: Progressing     Problem: ABCDS Injury Assessment  Goal: Absence of physical injury  Outcome: Progressing     Problem: Cardiovascular - Adult  Goal: Maintains optimal cardiac output and hemodynamic stability  Outcome: Progressing  Goal: Absence of cardiac dysrhythmias or at baseline  Outcome: Progressing     Problem: Metabolic/Fluid and Electrolytes - Adult  Goal: Electrolytes maintained within normal limits  Outcome: Progressing

## 2024-09-03 DIAGNOSIS — Z95.818 STATUS POST PLACEMENT OF IMPLANTABLE LOOP RECORDER: Primary | ICD-10-CM

## 2024-09-03 DIAGNOSIS — R00.2 PALPITATIONS: ICD-10-CM

## 2024-09-03 DIAGNOSIS — I48.0 PAF (PAROXYSMAL ATRIAL FIBRILLATION) (HCC): ICD-10-CM

## 2024-09-09 NOTE — PROGRESS NOTES
Physician Progress Note      PATIENT:               SERGIO BAILEY  [facility] #:                  [id number]  :                       1955  ADMIT DATE:       2024 10:03 PM  DISCH DATE:        2024 5:06 PM  RESPONDING  PROVIDER #:        Yari Johnson MD          QUERY TEXT:    Patient admitted with atrial fibrillation and is maintained on Eliquis. If   possible, please document in progress notes and discharge summary if you are   evaluating and/or treating any of the following:?    ?The medical record reflects the following:  Risk Factors: Age, HTN, Sex  Clinical Indicators: Discharge summary-history of atrial fibrillation on   Eliquis,  Treatment:  Eliquis          Thank you,  Phyllis Michelle, [facility] CDS  Options provided:  -- Secondary hypercoagulable state in a patient with atrial fibrillation  -- Other - I will add my own diagnosis  -- Disagree - Not applicable / Not valid  -- Disagree - Clinically unable to determine / Unknown  -- Refer to Clinical Documentation Reviewer    PROVIDER RESPONSE TEXT:    Provider disagreed with this query.    Query created by: Phyllis Michelle on 2024 3:47 AM      QUERY TEXT:    Patient was admitted with atrial fibrillation with RVR. Patient was noted to   have troponin>20% ranging from 13-60 with reference range of 0-14. Abnormal   ECG. After study, can the patient's cardiac condition be  specified as:    The medical record reflects the following:  Risk Factors:  A-fib, HTN,  Clinical Indicators: EKG shows, Atrial fibrillation with RVR  Incomplete RBBB  Cannot rule out anterior infarct - age undetermined  ST resolution compared to previous ECG  Left ventricular hypertrophy  Inferior/lateral ST-T abnormality may be due to hypertrophy and/or ischemia  Comparison Summary: Significant changes including rhythm  Summary: Abnormal ECG  Troponin-13,16,  Treatment:  Diltiazem, EKG, Serial lab        Thank you,  Phyllis [facility] CDS  Options provided:  -- Type 2 MI due to atrial

## 2024-10-08 DIAGNOSIS — R00.2 PALPITATIONS: ICD-10-CM

## 2024-10-08 DIAGNOSIS — I48.0 PAF (PAROXYSMAL ATRIAL FIBRILLATION) (HCC): ICD-10-CM

## 2024-10-08 DIAGNOSIS — Z95.818 STATUS POST PLACEMENT OF IMPLANTABLE LOOP RECORDER: Primary | ICD-10-CM

## 2024-10-08 DIAGNOSIS — Z45.09 ENCOUNTER FOR LOOP RECORDER CHECK: ICD-10-CM

## 2024-10-08 PROCEDURE — 93298 REM INTERROG DEV EVAL SCRMS: CPT | Performed by: CLINICAL NURSE SPECIALIST

## 2024-10-22 ENCOUNTER — OFFICE VISIT (OUTPATIENT)
Dept: CARDIOLOGY CLINIC | Age: 69
End: 2024-10-22
Payer: MEDICARE

## 2024-10-22 VITALS
WEIGHT: 213 LBS | HEIGHT: 65 IN | SYSTOLIC BLOOD PRESSURE: 122 MMHG | BODY MASS INDEX: 35.49 KG/M2 | OXYGEN SATURATION: 99 % | HEART RATE: 51 BPM | DIASTOLIC BLOOD PRESSURE: 80 MMHG

## 2024-10-22 DIAGNOSIS — I48.0 PAF (PAROXYSMAL ATRIAL FIBRILLATION) (HCC): Primary | ICD-10-CM

## 2024-10-22 PROCEDURE — 3079F DIAST BP 80-89 MM HG: CPT | Performed by: INTERNAL MEDICINE

## 2024-10-22 PROCEDURE — 1090F PRES/ABSN URINE INCON ASSESS: CPT | Performed by: INTERNAL MEDICINE

## 2024-10-22 PROCEDURE — G8484 FLU IMMUNIZE NO ADMIN: HCPCS | Performed by: INTERNAL MEDICINE

## 2024-10-22 PROCEDURE — 1036F TOBACCO NON-USER: CPT | Performed by: INTERNAL MEDICINE

## 2024-10-22 PROCEDURE — 3017F COLORECTAL CA SCREEN DOC REV: CPT | Performed by: INTERNAL MEDICINE

## 2024-10-22 PROCEDURE — G8417 CALC BMI ABV UP PARAM F/U: HCPCS | Performed by: INTERNAL MEDICINE

## 2024-10-22 PROCEDURE — 3074F SYST BP LT 130 MM HG: CPT | Performed by: INTERNAL MEDICINE

## 2024-10-22 PROCEDURE — 1123F ACP DISCUSS/DSCN MKR DOCD: CPT | Performed by: INTERNAL MEDICINE

## 2024-10-22 PROCEDURE — G8400 PT W/DXA NO RESULTS DOC: HCPCS | Performed by: INTERNAL MEDICINE

## 2024-10-22 PROCEDURE — G8427 DOCREV CUR MEDS BY ELIG CLIN: HCPCS | Performed by: INTERNAL MEDICINE

## 2024-10-22 PROCEDURE — 99214 OFFICE O/P EST MOD 30 MIN: CPT | Performed by: INTERNAL MEDICINE

## 2024-10-22 RX ORDER — SPIRONOLACTONE 50 MG/1
50 TABLET, FILM COATED ORAL DAILY
COMMUNITY
Start: 2024-09-18

## 2024-10-22 NOTE — PROGRESS NOTES
[facility] Cardiology  [address]  [address]  [facility] [address]  [phone]    Emerita Orourke is a 69 y.o. female presents with atrial fibrillation and occasional hypotension.  She has had no atrial fibrillation according to her monitor recently.  We had increased her sotalol and she is anticoagulated with apixaban.  Her blood pressures occasionally get in the 90s over 60s and if that happens at night she holds her evening time olmesartan.  Overall she feels well and she does have bradycardia into the 30s at 6 or 7 in the morning most days although this is when she is just getting started and she does not report any syncope.      Review of Systems   Constitutional: Negative for fever, chills, diaphoresis, activity change, appetite change, fatigue and unexpected weight change.   Eyes: Negative for photophobia, pain, redness and visual disturbance.   Respiratory: Negative for apnea, cough, chest tightness, shortness of breath, wheezing and stridor.    Cardiovascular: Negative for chest pain, palpitations and leg swelling.   Gastrointestinal: Negative for abdominal distention.   Genitourinary: Negative for dysuria, urgency and frequency.   Musculoskeletal: Negative for myalgias, arthralgias and gait problem.   Skin: Negative for color change, pallor, rash and wound.   Neurological: Negative for dizziness, tremors, speech difficulty, weakness and numbness.   Hematological: Does not bruise/bleed easily.   Psychiatric/Behavioral: Negative.          Social History     Socioeconomic History    Marital status:      Spouse name: Not on file    Number of children: Not on file    Years of education: Not on file    Highest education level: Not on file   Occupational History    Not on file   Tobacco Use    Smoking status: Never    Smokeless tobacco: Never   Vaping Use    Vaping status: Never Used   Substance and Sexual Activity    Alcohol use: No    Drug use: No    Sexual activity: Not on file   Other Topics

## 2024-11-14 DIAGNOSIS — I48.0 PAF (PAROXYSMAL ATRIAL FIBRILLATION) (HCC): ICD-10-CM

## 2024-11-14 DIAGNOSIS — Z45.09 ENCOUNTER FOR LOOP RECORDER CHECK: Primary | ICD-10-CM

## 2024-11-14 DIAGNOSIS — I10 ESSENTIAL HYPERTENSION: ICD-10-CM

## 2024-12-17 DIAGNOSIS — Z95.818 STATUS POST PLACEMENT OF IMPLANTABLE LOOP RECORDER: Primary | ICD-10-CM

## 2024-12-17 DIAGNOSIS — I48.0 PAF (PAROXYSMAL ATRIAL FIBRILLATION) (HCC): ICD-10-CM

## 2024-12-17 DIAGNOSIS — I63.9 CRYPTOGENIC STROKE (HCC): ICD-10-CM

## 2024-12-17 PROCEDURE — 93298 REM INTERROG DEV EVAL SCRMS: CPT | Performed by: INTERNAL MEDICINE

## 2025-01-15 DIAGNOSIS — Z45.09 ENCOUNTER FOR LOOP RECORDER CHECK: Primary | ICD-10-CM

## 2025-01-15 DIAGNOSIS — I48.0 PAF (PAROXYSMAL ATRIAL FIBRILLATION) (HCC): ICD-10-CM

## 2025-01-26 ENCOUNTER — HOSPITAL ENCOUNTER (INPATIENT)
Age: 70
LOS: 2 days | Discharge: HOME | DRG: 309 | End: 2025-01-28
Attending: EMERGENCY MEDICINE | Admitting: HOSPITALIST
Payer: MEDICARE

## 2025-01-26 ENCOUNTER — APPOINTMENT (OUTPATIENT)
Dept: GENERAL RADIOLOGY | Age: 70
DRG: 309 | End: 2025-01-26
Payer: MEDICARE

## 2025-01-26 DIAGNOSIS — I48.0 PAROXYSMAL ATRIAL FIBRILLATION (HCC): ICD-10-CM

## 2025-01-26 DIAGNOSIS — Z79.01 ON CONTINUOUS ORAL ANTICOAGULATION: ICD-10-CM

## 2025-01-26 DIAGNOSIS — I48.91 ATRIAL FIBRILLATION (HCC): ICD-10-CM

## 2025-01-26 DIAGNOSIS — I48.91 ATRIAL FIBRILLATION WITH RVR (HCC): Primary | ICD-10-CM

## 2025-01-26 PROBLEM — K21.9 GERD (GASTROESOPHAGEAL REFLUX DISEASE): Status: ACTIVE | Noted: 2025-01-26

## 2025-01-26 LAB
ALBUMIN SERPL-MCNC: 4.3 G/DL (ref 3.5–5.2)
ALP SERPL-CCNC: 120 U/L (ref 35–104)
ALT SERPL-CCNC: 25 U/L (ref 5–33)
ANION GAP SERPL CALCULATED.3IONS-SCNC: 13 MMOL/L (ref 8–16)
AST SERPL-CCNC: 24 U/L (ref 5–32)
BACTERIA URNS QL MICRO: NEGATIVE /HPF
BASOPHILS # BLD: 0 K/UL (ref 0–0.2)
BASOPHILS NFR BLD: 0.4 % (ref 0–1)
BILIRUB SERPL-MCNC: 0.4 MG/DL (ref 0.2–1.2)
BILIRUB UR QL STRIP: NEGATIVE
BNP BLD-MCNC: 665 PG/ML (ref 0–124)
BUN SERPL-MCNC: 18 MG/DL (ref 8–23)
CALCIUM SERPL-MCNC: 9.9 MG/DL (ref 8.8–10.2)
CHLORIDE SERPL-SCNC: 103 MMOL/L (ref 98–107)
CHOLEST SERPL-MCNC: 163 MG/DL (ref 0–199)
CLARITY UR: CLEAR
CO2 SERPL-SCNC: 24 MMOL/L (ref 22–29)
COLOR UR: YELLOW
CREAT SERPL-MCNC: 0.6 MG/DL (ref 0.5–0.9)
CRYSTALS URNS MICRO: ABNORMAL /HPF
EOSINOPHIL # BLD: 0.1 K/UL (ref 0–0.6)
EOSINOPHIL NFR BLD: 0.9 % (ref 0–5)
EPI CELLS #/AREA URNS AUTO: 0 /HPF (ref 0–5)
ERYTHROCYTE [DISTWIDTH] IN BLOOD BY AUTOMATED COUNT: 12.4 % (ref 11.5–14.5)
GLUCOSE SERPL-MCNC: 125 MG/DL (ref 70–99)
GLUCOSE UR STRIP.AUTO-MCNC: NEGATIVE MG/DL
HBA1C MFR BLD: 5.6 % (ref 4–5.6)
HCT VFR BLD AUTO: 40.9 % (ref 37–47)
HDLC SERPL-MCNC: 56 MG/DL (ref 40–60)
HGB BLD-MCNC: 13.7 G/DL (ref 12–16)
HGB UR STRIP.AUTO-MCNC: ABNORMAL MG/L
HYALINE CASTS #/AREA URNS AUTO: 0 /HPF (ref 0–8)
IMM GRANULOCYTES # BLD: 0 K/UL
KETONES UR STRIP.AUTO-MCNC: NEGATIVE MG/DL
LDLC SERPL CALC-MCNC: 73 MG/DL
LEUKOCYTE ESTERASE UR QL STRIP.AUTO: ABNORMAL
LYMPHOCYTES # BLD: 2.1 K/UL (ref 1.1–4.5)
LYMPHOCYTES NFR BLD: 26.7 % (ref 20–40)
MAGNESIUM SERPL-MCNC: 1.9 MG/DL (ref 1.6–2.4)
MAGNESIUM SERPL-MCNC: 2 MG/DL (ref 1.6–2.4)
MCH RBC QN AUTO: 31.4 PG (ref 27–31)
MCHC RBC AUTO-ENTMCNC: 33.5 G/DL (ref 33–37)
MCV RBC AUTO: 93.8 FL (ref 81–99)
MONOCYTES # BLD: 0.9 K/UL (ref 0–0.9)
MONOCYTES NFR BLD: 10.8 % (ref 0–10)
NEUTROPHILS # BLD: 4.9 K/UL (ref 1.5–7.5)
NEUTS SEG NFR BLD: 60.9 % (ref 50–65)
NITRITE UR QL STRIP.AUTO: NEGATIVE
PH UR STRIP.AUTO: 7.5 [PH] (ref 5–8)
PHOSPHATE SERPL-MCNC: 3.2 MG/DL (ref 2.5–4.5)
PLATELET # BLD AUTO: 201 K/UL (ref 130–400)
PMV BLD AUTO: 10.2 FL (ref 9.4–12.3)
POTASSIUM SERPL-SCNC: 4 MMOL/L (ref 3.5–5.1)
PROT SERPL-MCNC: 7.3 G/DL (ref 6.4–8.3)
PROT UR STRIP.AUTO-MCNC: ABNORMAL MG/DL
RBC # BLD AUTO: 4.36 M/UL (ref 4.2–5.4)
RBC #/AREA URNS AUTO: 12 /HPF (ref 0–4)
SODIUM SERPL-SCNC: 140 MMOL/L (ref 136–145)
SP GR UR STRIP.AUTO: 1.01 (ref 1–1.03)
T4 FREE SERPL-MCNC: 1.4 NG/DL (ref 0.93–1.7)
TRIGL SERPL-MCNC: 168 MG/DL (ref 0–149)
TROPONIN, HIGH SENSITIVITY: 24 NG/L (ref 0–14)
TSH SERPL DL<=0.005 MIU/L-ACNC: 2.54 UIU/ML (ref 0.27–4.2)
UROBILINOGEN UR STRIP.AUTO-MCNC: 0.2 E.U./DL
WBC # BLD AUTO: 8 K/UL (ref 4.8–10.8)
WBC #/AREA URNS AUTO: 5 /HPF (ref 0–5)

## 2025-01-26 PROCEDURE — 2580000003 HC RX 258: Performed by: HOSPITALIST

## 2025-01-26 PROCEDURE — 81001 URINALYSIS AUTO W/SCOPE: CPT

## 2025-01-26 PROCEDURE — 71045 X-RAY EXAM CHEST 1 VIEW: CPT

## 2025-01-26 PROCEDURE — 36415 COLL VENOUS BLD VENIPUNCTURE: CPT

## 2025-01-26 PROCEDURE — 84439 ASSAY OF FREE THYROXINE: CPT

## 2025-01-26 PROCEDURE — 93005 ELECTROCARDIOGRAM TRACING: CPT | Performed by: EMERGENCY MEDICINE

## 2025-01-26 PROCEDURE — 85025 COMPLETE CBC W/AUTO DIFF WBC: CPT

## 2025-01-26 PROCEDURE — 83880 ASSAY OF NATRIURETIC PEPTIDE: CPT

## 2025-01-26 PROCEDURE — 2500000003 HC RX 250 WO HCPCS: Performed by: HOSPITALIST

## 2025-01-26 PROCEDURE — 83735 ASSAY OF MAGNESIUM: CPT

## 2025-01-26 PROCEDURE — 84484 ASSAY OF TROPONIN QUANT: CPT

## 2025-01-26 PROCEDURE — 80061 LIPID PANEL: CPT

## 2025-01-26 PROCEDURE — 83036 HEMOGLOBIN GLYCOSYLATED A1C: CPT

## 2025-01-26 PROCEDURE — 84100 ASSAY OF PHOSPHORUS: CPT

## 2025-01-26 PROCEDURE — 99285 EMERGENCY DEPT VISIT HI MDM: CPT

## 2025-01-26 PROCEDURE — 84443 ASSAY THYROID STIM HORMONE: CPT

## 2025-01-26 PROCEDURE — 6370000000 HC RX 637 (ALT 250 FOR IP): Performed by: HOSPITALIST

## 2025-01-26 PROCEDURE — 80053 COMPREHEN METABOLIC PANEL: CPT

## 2025-01-26 PROCEDURE — 2140000000 HC CCU INTERMEDIATE R&B

## 2025-01-26 PROCEDURE — 2580000003 HC RX 258: Performed by: EMERGENCY MEDICINE

## 2025-01-26 PROCEDURE — 2500000003 HC RX 250 WO HCPCS: Performed by: EMERGENCY MEDICINE

## 2025-01-26 PROCEDURE — 96374 THER/PROPH/DIAG INJ IV PUSH: CPT

## 2025-01-26 RX ORDER — MAGNESIUM SULFATE IN WATER 40 MG/ML
2000 INJECTION, SOLUTION INTRAVENOUS PRN
Status: DISCONTINUED | OUTPATIENT
Start: 2025-01-26 | End: 2025-01-28 | Stop reason: HOSPADM

## 2025-01-26 RX ORDER — ASCORBIC ACID 500 MG
250 TABLET ORAL DAILY
Status: DISCONTINUED | OUTPATIENT
Start: 2025-01-27 | End: 2025-01-28 | Stop reason: HOSPADM

## 2025-01-26 RX ORDER — CALCIUM CARBONATE 500 MG/1
500 TABLET, CHEWABLE ORAL 2 TIMES DAILY
Status: DISCONTINUED | OUTPATIENT
Start: 2025-01-26 | End: 2025-01-28 | Stop reason: HOSPADM

## 2025-01-26 RX ORDER — SODIUM CHLORIDE, SODIUM LACTATE, POTASSIUM CHLORIDE, AND CALCIUM CHLORIDE .6; .31; .03; .02 G/100ML; G/100ML; G/100ML; G/100ML
1000 INJECTION, SOLUTION INTRAVENOUS ONCE
Status: COMPLETED | OUTPATIENT
Start: 2025-01-26 | End: 2025-01-26

## 2025-01-26 RX ORDER — DILTIAZEM HYDROCHLORIDE 5 MG/ML
12.5 INJECTION INTRAVENOUS ONCE
Status: COMPLETED | OUTPATIENT
Start: 2025-01-26 | End: 2025-01-26

## 2025-01-26 RX ORDER — FAMOTIDINE 20 MG/1
40 TABLET, FILM COATED ORAL DAILY
Status: DISCONTINUED | OUTPATIENT
Start: 2025-01-27 | End: 2025-01-28 | Stop reason: HOSPADM

## 2025-01-26 RX ORDER — SPIRONOLACTONE 25 MG/1
50 TABLET ORAL DAILY
Status: DISCONTINUED | OUTPATIENT
Start: 2025-01-27 | End: 2025-01-28 | Stop reason: HOSPADM

## 2025-01-26 RX ORDER — PROMETHAZINE HYDROCHLORIDE 25 MG/1
12.5 TABLET ORAL EVERY 6 HOURS PRN
Status: DISCONTINUED | OUTPATIENT
Start: 2025-01-26 | End: 2025-01-28 | Stop reason: HOSPADM

## 2025-01-26 RX ORDER — SODIUM CHLORIDE 9 MG/ML
INJECTION, SOLUTION INTRAVENOUS PRN
Status: DISCONTINUED | OUTPATIENT
Start: 2025-01-26 | End: 2025-01-28 | Stop reason: HOSPADM

## 2025-01-26 RX ORDER — SOTALOL HYDROCHLORIDE 80 MG/1
80 TABLET ORAL 2 TIMES DAILY
Status: DISCONTINUED | OUTPATIENT
Start: 2025-01-26 | End: 2025-01-28 | Stop reason: HOSPADM

## 2025-01-26 RX ORDER — LABETALOL HYDROCHLORIDE 5 MG/ML
10 INJECTION, SOLUTION INTRAVENOUS EVERY 4 HOURS PRN
Status: DISCONTINUED | OUTPATIENT
Start: 2025-01-26 | End: 2025-01-28 | Stop reason: HOSPADM

## 2025-01-26 RX ORDER — SODIUM CHLORIDE 0.9 % (FLUSH) 0.9 %
5-40 SYRINGE (ML) INJECTION PRN
Status: DISCONTINUED | OUTPATIENT
Start: 2025-01-26 | End: 2025-01-28 | Stop reason: HOSPADM

## 2025-01-26 RX ORDER — LEVOTHYROXINE SODIUM 137 UG/1
137 TABLET ORAL
Status: DISCONTINUED | OUTPATIENT
Start: 2025-01-27 | End: 2025-01-28 | Stop reason: HOSPADM

## 2025-01-26 RX ORDER — ACETAMINOPHEN 325 MG/1
650 TABLET ORAL EVERY 4 HOURS PRN
Status: DISCONTINUED | OUTPATIENT
Start: 2025-01-26 | End: 2025-01-28 | Stop reason: HOSPADM

## 2025-01-26 RX ORDER — CHLORAL HYDRATE 500 MG
1 CAPSULE ORAL 2 TIMES DAILY
Status: DISCONTINUED | OUTPATIENT
Start: 2025-01-26 | End: 2025-01-26

## 2025-01-26 RX ORDER — VITAMIN B COMPLEX
1000 TABLET ORAL DAILY
Status: DISCONTINUED | OUTPATIENT
Start: 2025-01-27 | End: 2025-01-28 | Stop reason: HOSPADM

## 2025-01-26 RX ORDER — POTASSIUM CHLORIDE 1500 MG/1
40 TABLET, EXTENDED RELEASE ORAL PRN
Status: DISCONTINUED | OUTPATIENT
Start: 2025-01-26 | End: 2025-01-28 | Stop reason: HOSPADM

## 2025-01-26 RX ORDER — LOSARTAN POTASSIUM 50 MG/1
50 TABLET ORAL 2 TIMES DAILY
Status: DISCONTINUED | OUTPATIENT
Start: 2025-01-26 | End: 2025-01-28 | Stop reason: HOSPADM

## 2025-01-26 RX ORDER — POTASSIUM CHLORIDE 7.45 MG/ML
10 INJECTION INTRAVENOUS PRN
Status: DISCONTINUED | OUTPATIENT
Start: 2025-01-26 | End: 2025-01-28 | Stop reason: HOSPADM

## 2025-01-26 RX ORDER — SODIUM CHLORIDE 0.9 % (FLUSH) 0.9 %
5-40 SYRINGE (ML) INJECTION EVERY 12 HOURS SCHEDULED
Status: DISCONTINUED | OUTPATIENT
Start: 2025-01-26 | End: 2025-01-28 | Stop reason: HOSPADM

## 2025-01-26 RX ADMIN — APIXABAN 5 MG: 5 TABLET, FILM COATED ORAL at 23:01

## 2025-01-26 RX ADMIN — DILTIAZEM HYDROCHLORIDE 5 MG/HR: 5 INJECTION, SOLUTION INTRAVENOUS at 23:41

## 2025-01-26 RX ADMIN — DILTIAZEM HYDROCHLORIDE 12.5 MG: 5 INJECTION, SOLUTION INTRAVENOUS at 19:21

## 2025-01-26 RX ADMIN — SOTALOL HYDROCHLORIDE 80 MG: 80 TABLET ORAL at 23:01

## 2025-01-26 RX ADMIN — DILTIAZEM HYDROCHLORIDE 5 MG/HR: 5 INJECTION, SOLUTION INTRAVENOUS at 23:46

## 2025-01-26 RX ADMIN — SODIUM CHLORIDE, PRESERVATIVE FREE 10 ML: 5 INJECTION INTRAVENOUS at 23:05

## 2025-01-26 RX ADMIN — SODIUM CHLORIDE, POTASSIUM CHLORIDE, SODIUM LACTATE AND CALCIUM CHLORIDE 1000 ML: 600; 310; 30; 20 INJECTION, SOLUTION INTRAVENOUS at 18:26

## 2025-01-26 RX ADMIN — ANTACID TABLETS 500 MG: 500 TABLET, CHEWABLE ORAL at 23:01

## 2025-01-26 RX ADMIN — DILTIAZEM HYDROCHLORIDE 12.5 MG: 5 INJECTION, SOLUTION INTRAVENOUS at 17:33

## 2025-01-26 RX ADMIN — SODIUM CHLORIDE: 9 INJECTION, SOLUTION INTRAVENOUS at 23:38

## 2025-01-26 RX ADMIN — LOSARTAN POTASSIUM 50 MG: 50 TABLET, FILM COATED ORAL at 23:02

## 2025-01-26 NOTE — ED PROVIDER NOTES
[facility] EMERGENCY DEPARTMENT  EMERGENCY DEPARTMENT ENCOUNTER      Pt Name: Emerita Orourke  MRN: [id number]  Birthdate 1955  Date of evaluation: 1/26/2025  Provider: Tyshawn Kelley Jr, MD    CHIEF COMPLAINT       Chief Complaint   Patient presents with    Atrial Fibrillation     Pt thinks she is back in afib, noticed palpitations at 1200         HISTORY OF PRESENT ILLNESS   (Location/Symptom, Timing/Onset,Context/Setting, Quality, Duration, Modifying Factors, Severity)  Note limiting factors.   Emerita Orourke is a 69 y.o. female who presents to the emergency department for evaluation after she started having palpitations and feeling of heart racing around noon today.  Says that it started while she was in charge.  No trigger to the onset of symptoms.  Denies any chest pain, shortness of breath, or other associated symptoms.  Has history of paroxysmal atrial fibrillation.  She is anticoagulated on Xarelto and takes sotalol.  Says that she had a URI a couple weeks ago and was treated with a course of antibiotics.  That has resolved.    HPI    NursingNotes were reviewed.    REVIEW OF SYSTEMS    (2-9 systems for level 4, 10 or more for level 5)     Review of Systems   Constitutional: Negative.    HENT: Negative.     Eyes: Negative.    Respiratory: Negative.  Negative for shortness of breath.    Cardiovascular:  Positive for palpitations. Negative for chest pain and leg swelling.   Gastrointestinal: Negative.    Genitourinary: Negative.    Musculoskeletal: Negative.    Skin: Negative.    Neurological: Negative.    Hematological: Negative.    Psychiatric/Behavioral: Negative.         A complete review of systems was performed and is negative except as noted above in the HPI.       PAST MEDICAL HISTORY     Past Medical History:   Diagnosis Date    Atrial fibrillation (HCC)     Disease of blood and blood forming organ     DVT in right leg     Hyperlipidemia 3/17/2015    Hypertension     Hyperthyroidism     Sleep

## 2025-01-27 LAB
ANION GAP SERPL CALCULATED.3IONS-SCNC: 13 MMOL/L (ref 8–16)
BASOPHILS # BLD: 0 K/UL (ref 0–0.2)
BASOPHILS NFR BLD: 0.3 % (ref 0–1)
BUN SERPL-MCNC: 16 MG/DL (ref 8–23)
CALCIUM SERPL-MCNC: 9.4 MG/DL (ref 8.8–10.2)
CHLORIDE SERPL-SCNC: 105 MMOL/L (ref 98–107)
CO2 SERPL-SCNC: 23 MMOL/L (ref 22–29)
CREAT SERPL-MCNC: 0.7 MG/DL (ref 0.5–0.9)
ECHO BSA: 2.07 M2
EKG P AXIS: 71 DEGREES
EKG P AXIS: NORMAL DEGREES
EKG P-R INTERVAL: 164 MS
EKG P-R INTERVAL: 170 MS
EKG Q-T INTERVAL: 360 MS
EKG Q-T INTERVAL: 464 MS
EKG QRS DURATION: 100 MS
EKG QRS DURATION: 104 MS
EKG QTC CALCULATION (BAZETT): 402 MS
EKG QTC CALCULATION (BAZETT): 435 MS
EKG T AXIS: 60 DEGREES
EKG T AXIS: NORMAL DEGREES
EOSINOPHIL # BLD: 0.1 K/UL (ref 0–0.6)
EOSINOPHIL NFR BLD: 1.6 % (ref 0–5)
ERYTHROCYTE [DISTWIDTH] IN BLOOD BY AUTOMATED COUNT: 12.5 % (ref 11.5–14.5)
GLUCOSE SERPL-MCNC: 142 MG/DL (ref 70–99)
HCT VFR BLD AUTO: 40.2 % (ref 37–47)
HGB BLD-MCNC: 13.2 G/DL (ref 12–16)
IMM GRANULOCYTES # BLD: 0 K/UL
LYMPHOCYTES # BLD: 2.8 K/UL (ref 1.1–4.5)
LYMPHOCYTES NFR BLD: 36.5 % (ref 20–40)
MAGNESIUM SERPL-MCNC: 1.7 MG/DL (ref 1.6–2.4)
MCH RBC QN AUTO: 31.4 PG (ref 27–31)
MCHC RBC AUTO-ENTMCNC: 32.8 G/DL (ref 33–37)
MCV RBC AUTO: 95.7 FL (ref 81–99)
MONOCYTES # BLD: 0.8 K/UL (ref 0–0.9)
MONOCYTES NFR BLD: 11 % (ref 0–10)
NEUTROPHILS # BLD: 3.9 K/UL (ref 1.5–7.5)
NEUTS SEG NFR BLD: 50.3 % (ref 50–65)
PLATELET # BLD AUTO: 182 K/UL (ref 130–400)
PMV BLD AUTO: 10.4 FL (ref 9.4–12.3)
POTASSIUM SERPL-SCNC: 3.4 MMOL/L (ref 3.5–5)
RBC # BLD AUTO: 4.2 M/UL (ref 4.2–5.4)
SODIUM SERPL-SCNC: 141 MMOL/L (ref 136–145)
WBC # BLD AUTO: 7.7 K/UL (ref 4.8–10.8)

## 2025-01-27 PROCEDURE — 99222 1ST HOSP IP/OBS MODERATE 55: CPT | Performed by: INTERNAL MEDICINE

## 2025-01-27 PROCEDURE — 80048 BASIC METABOLIC PNL TOTAL CA: CPT

## 2025-01-27 PROCEDURE — 6370000000 HC RX 637 (ALT 250 FOR IP): Performed by: HOSPITALIST

## 2025-01-27 PROCEDURE — 92960 CARDIOVERSION ELECTRIC EXT: CPT | Performed by: INTERNAL MEDICINE

## 2025-01-27 PROCEDURE — 93010 ELECTROCARDIOGRAM REPORT: CPT | Performed by: INTERNAL MEDICINE

## 2025-01-27 PROCEDURE — 93005 ELECTROCARDIOGRAM TRACING: CPT | Performed by: HOSPITALIST

## 2025-01-27 PROCEDURE — 5A2204Z RESTORATION OF CARDIAC RHYTHM, SINGLE: ICD-10-PCS | Performed by: INTERNAL MEDICINE

## 2025-01-27 PROCEDURE — 83735 ASSAY OF MAGNESIUM: CPT

## 2025-01-27 PROCEDURE — 93005 ELECTROCARDIOGRAM TRACING: CPT | Performed by: INTERNAL MEDICINE

## 2025-01-27 PROCEDURE — 85025 COMPLETE CBC W/AUTO DIFF WBC: CPT

## 2025-01-27 PROCEDURE — 2500000003 HC RX 250 WO HCPCS: Performed by: HOSPITALIST

## 2025-01-27 PROCEDURE — 6360000002 HC RX W HCPCS: Performed by: INTERNAL MEDICINE

## 2025-01-27 PROCEDURE — 6360000002 HC RX W HCPCS: Performed by: STUDENT IN AN ORGANIZED HEALTH CARE EDUCATION/TRAINING PROGRAM

## 2025-01-27 PROCEDURE — 2140000000 HC CCU INTERMEDIATE R&B

## 2025-01-27 PROCEDURE — 99152 MOD SED SAME PHYS/QHP 5/>YRS: CPT | Performed by: INTERNAL MEDICINE

## 2025-01-27 PROCEDURE — 6360000002 HC RX W HCPCS: Performed by: HOSPITALIST

## 2025-01-27 PROCEDURE — 36415 COLL VENOUS BLD VENIPUNCTURE: CPT

## 2025-01-27 RX ORDER — MAGNESIUM SULFATE IN WATER 40 MG/ML
2000 INJECTION, SOLUTION INTRAVENOUS ONCE
Status: COMPLETED | OUTPATIENT
Start: 2025-01-27 | End: 2025-01-27

## 2025-01-27 RX ORDER — FENTANYL CITRATE 50 UG/ML
INJECTION, SOLUTION INTRAMUSCULAR; INTRAVENOUS PRN
Status: DISCONTINUED | OUTPATIENT
Start: 2025-01-27 | End: 2025-01-27 | Stop reason: HOSPADM

## 2025-01-27 RX ORDER — MIDAZOLAM HYDROCHLORIDE 1 MG/ML
INJECTION, SOLUTION INTRAMUSCULAR; INTRAVENOUS PRN
Status: DISCONTINUED | OUTPATIENT
Start: 2025-01-27 | End: 2025-01-27 | Stop reason: HOSPADM

## 2025-01-27 RX ADMIN — OXYCODONE HYDROCHLORIDE AND ACETAMINOPHEN 250 MG: 500 TABLET ORAL at 09:41

## 2025-01-27 RX ADMIN — APIXABAN 5 MG: 5 TABLET, FILM COATED ORAL at 09:41

## 2025-01-27 RX ADMIN — MAGNESIUM SULFATE HEPTAHYDRATE 2000 MG: 40 INJECTION, SOLUTION INTRAVENOUS at 10:04

## 2025-01-27 RX ADMIN — ANTACID TABLETS 500 MG: 500 TABLET, CHEWABLE ORAL at 09:41

## 2025-01-27 RX ADMIN — ANTACID TABLETS 500 MG: 500 TABLET, CHEWABLE ORAL at 21:48

## 2025-01-27 RX ADMIN — SOTALOL HYDROCHLORIDE 80 MG: 80 TABLET ORAL at 09:41

## 2025-01-27 RX ADMIN — SODIUM CHLORIDE, PRESERVATIVE FREE 10 ML: 5 INJECTION INTRAVENOUS at 09:42

## 2025-01-27 RX ADMIN — Medication 1000 UNITS: at 09:41

## 2025-01-27 RX ADMIN — SODIUM CHLORIDE, PRESERVATIVE FREE 10 ML: 5 INJECTION INTRAVENOUS at 21:51

## 2025-01-27 RX ADMIN — FAMOTIDINE 40 MG: 20 TABLET ORAL at 09:41

## 2025-01-27 RX ADMIN — APIXABAN 5 MG: 5 TABLET, FILM COATED ORAL at 21:50

## 2025-01-27 RX ADMIN — LEVOTHYROXINE SODIUM 137 MCG: 137 TABLET ORAL at 06:33

## 2025-01-27 RX ADMIN — LOSARTAN POTASSIUM 50 MG: 50 TABLET, FILM COATED ORAL at 21:50

## 2025-01-27 RX ADMIN — SPIRONOLACTONE 50 MG: 25 TABLET ORAL at 09:41

## 2025-01-27 RX ADMIN — LOSARTAN POTASSIUM 50 MG: 50 TABLET, FILM COATED ORAL at 09:41

## 2025-01-27 ASSESSMENT — ENCOUNTER SYMPTOMS
EYE DISCHARGE: 0
DIARRHEA: 0
BACK PAIN: 0
VOMITING: 0
BLOOD IN STOOL: 0
WHEEZING: 0
CONSTIPATION: 0
COUGH: 0
ABDOMINAL DISTENTION: 0
SHORTNESS OF BREATH: 0

## 2025-01-27 NOTE — ED NOTES
Called and spoke to Claire on 7th.  She is aware that the diltiazem has not been started.  Pt is good to go up when a bed is placed in the room.

## 2025-01-27 NOTE — PROCEDURES
PROCEDURE NOTE  Date: 1/27/2025   Name: Emerita Orourke  YOB: 1955    Procedures    Date of Procedure:  1/27/2025     Indications:  Atrial fibrillation with an appropriate duration of anticoagulation     Conscious Sedation Protocol Used During this Procedure -        Anesthesia: Moderate   Sedation: 3 mg Midazolam (Versed)  100 mcg Fentanyl   Start time: 12:51 PM   Stop time: 12:59 PM   ASA Class: 3   EBL Not applicable     A trained medical personnel administered medications at my direction.  The patient was monitored continuously with ECG, pulse oximetry, blood pressure monitoring and direct observation.       After obtaining informed written consent and an appropriate level of conscious sedation, DCCV with 360 J x 1 using paddles was successful in restoring sinus rhythm.      Complications:  none      Impression:  Successful DC cardioversion of atrial fibrillation to normal sinus rhythm on Eliquis used for anticoagulation.  Maintained on Betapace.    Electronically signed by Clayton Kline MD on 1/27/25

## 2025-01-27 NOTE — CONSULTS
[facility]  Cardiology Consult      Requesting MD:  Julio Liao MD   Admit Status:         History obtained from:   [] Patient  [] Other (specify):     PROBLEM LIST:    Patient Active Problem List    Diagnosis Date Noted    Atrial fibrillation with RVR ([facility]) 03/17/2018     Priority: High    Essential hypertension      Priority: High    Rhinorrhea      Priority: High    Abnormal stress echo      Priority: High    Precordial pain      Priority: High    A-fib ([facility]) 09/01/2024     Priority: Low    Atrial fibrillation ([facility]) 09/20/2021     Priority: Low    Hyperglycemia 09/19/2021     Priority: Low    Chronic anticoagulation 07/29/2020     Priority: Low    PAF (paroxysmal atrial fibrillation) ([facility]) 05/25/2017     Priority: Low    Hyperlipidemia 03/17/2015     Priority: Low    Acquired hypothyroidism 02/25/2015     Priority: Low    GERD (gastroesophageal reflux disease) 01/26/2025     1.  Paroxysmal atrial fibrillation previously on Betapace (held for bradycardia) with recurrent atrial fibrillation episode 9/2021, on Eliquis, restarted Betapace, on Eliquis, ILR placement 9/21/2021, normal LV systolic function (echo 9/18/2021).  2.  Hypertension.  3.  Obstructive sleep apnea with CPAP utilization.  4.  Hypothyroidism on supplementation with iatrogenic hyperthyroidism and recurrent atrial fibrillation.    PRESENTATION: Emerita Orourke is a 69 y.o. year old female who presents with palpitations that began recently.  Had upper respiratory tract infection.  Has been compliant with her medications including Eliquis and Betapace.  No chest pain or shortness of breath reported.  TSH 2.54 with normal free T4.  .  Troponin 24.  EKG shows coarse atrial fibrillation.      REVIEW OF SYSTEMS:  Review of Systems   Constitutional:  Negative for activity change, fatigue and fever.   HENT:  Negative for ear pain, hearing loss and tinnitus.    Eyes:  Negative for discharge and visual disturbance.

## 2025-01-27 NOTE — PLAN OF CARE
SUBJECTIVE:    Mrs. Emerita Orourke has a history of A Fib and has been maintained on Apixaban and Sotalol for same. She has received three doses of Cardizem IVP and has remained in A Fib with RVR. She has declined cardioversion and requested to be treated with a Cardizem infusion for which she has been referred to the hospitlaist team. She will be monitored and further evaluated while being treated with Cardizem  infusion and will have sub-specialist evaluation in the AM.      OBJECTIVE:    /66   Pulse 82   Temp 98.6 °F (37 °C) (Oral)   Resp 21   Wt 94.3 kg (208 lb)   SpO2 97%   BMI 34.61 kg/m²       ASSESSMENTS & PLANS:    Atrial Fibrillation with recurrent Rapid Ventricular Response:  Admit  to Cardiac [facility] under Hospitalist Team  Cardiology consultation in AM  NPO except sips with meds from [place]   EKG  TTE deferred to Cardiology  Troponin trend  NPO after [place], cardiac diet prior to that withOUT caffeine  ASA 81 mg daily  Lipid Panel in AM  TSH with FT4 reflex were already done in the ED and were WNL  Telemetry  BMP, Magnesium and Phosphorous to be added  AC needed, and already on chronically with Apixaban  Continue Sotalol  Diltiazem GGT    Chronic Medical Problems:  Continue home regimen as indicated  Any puffers will be subbed to nebulizers as able, and any oral  antihyperglycemics to an insuling regimen with POCT scheduled nad PRN as well as providion of an antihypoglycemic orders set for safety    Supportive and Prophylactic Txx:  DVT PPx: on Apixaban  GI (PUD) PPx: not indicated  PT: defer for now as having arrhythmia actively and acutely treated      Case d/w EP in detail  Chart reviewed   Orders entered by me with CPOE  Case to be d/w NP hospitalist in detail

## 2025-01-27 NOTE — ED NOTES
Diltiazem held per order parameters.  Pt heart rate in 80's and occasionally dropping into the 60's.

## 2025-01-27 NOTE — ED NOTES
ED TO INPATIENT SBAR HANDOFF    Patient Name: Emerita Orourke   : 1955  69 y.o.   Family/Caregiver Present: No  Code Status Order: Prior    C-SSRS: Risk of Suicide: No Risk  Sitter No  Restraints:         Situation  Chief Complaint:   Chief Complaint   Patient presents with    Atrial Fibrillation     Pt thinks she is back in afib, noticed palpitations at 1200     Patient Diagnosis: Atrial fibrillation with RVR (HCC) [I48.91]     Brief Description of Patient's Condition: Pt arrived from home c/o palpitations and stating she thought she was in AFIB .  Pt has hx of AFIB.  Pt was given 2 12.5 bolus of diltiazem in ER.  Pt has a diltiazem drip ordered but pt heart rate is is consistently in the 80's and dips into the 60's at times.  Pt has been resting comfortably since I have come on shift. She states that she is no longer having palpitations.  Pt is up to the bedside commode on her own.  Pt is alert and oriented X4 and VSS.        Mental Status: oriented, alert, coherent, logical, thought processes intact, and able to concentrate and follow conversation  Arrived from: home    Imaging:   XR CHEST PORTABLE   Final Result       1. Prominent heart size and probable loop recorder.   2. Atelectasis and less likely infiltrate in the right lung base.               .           ______________________________________    Electronically signed by: ALEXEI JACK D.O.   Date:     2025   Time:    18:00         COVID-19 Results:   Internal Administration   First Dose      Second Dose           Last COVID Lab SARS-CoV-2, NAAT (no units)   Date Value   2021 Not Detected           Abnormal labs:   Abnormal Labs Reviewed   CBC WITH AUTO DIFFERENTIAL - Abnormal; Notable for the following components:       Result Value    MCH 31.4 (*)     Monocytes % 10.8 (*)     All other components within normal limits   COMPREHENSIVE METABOLIC PANEL - Abnormal; Notable for the following components:    Glucose 125 (*)

## 2025-01-27 NOTE — H&P
prn q4h for SBP >160 if HR >60 bpm      Acquired hypothyroidism   - Continue synthroid      GERD   - Continue pepcid    Resolved Problems:    * No resolved hospital problems. *    DVT prophylaxis: Patient on eliquis  GI prophylaxis: Patient on home pepcid    Signed:  CHUCK Rod - CNP, 1/26/2025 8:41 PM      EMR Dragon/Transcription disclaimer:   Much of this encounter note is an electronic transcription/translation of spoken language to printed text. The electronic translation of spoken language may permit erroneous, or at times, nonsensical words or phrases to be inadvertently transcribed; although attempts have made to review the note for such errors, some may still exist.

## 2025-01-27 NOTE — PLAN OF CARE
Problem: Discharge Planning  Goal: Discharge to home or other facility with appropriate resources  Outcome: Progressing  Flowsheets (Taken 1/26/2025 2143)  Discharge to home or other facility with appropriate resources: Identify barriers to discharge with patient and caregiver     Problem: ABCDS Injury Assessment  Goal: Absence of physical injury  Outcome: Progressing  Flowsheets (Taken 1/26/2025 2154)  Absence of Physical Injury: Implement safety measures based on patient assessment

## 2025-01-28 VITALS
OXYGEN SATURATION: 98 % | DIASTOLIC BLOOD PRESSURE: 68 MMHG | BODY MASS INDEX: 34.14 KG/M2 | WEIGHT: 204.9 LBS | HEART RATE: 65 BPM | TEMPERATURE: 96.9 F | SYSTOLIC BLOOD PRESSURE: 158 MMHG | RESPIRATION RATE: 20 BRPM | HEIGHT: 65 IN

## 2025-01-28 LAB
ANION GAP SERPL CALCULATED.3IONS-SCNC: 13 MMOL/L (ref 8–16)
BASOPHILS # BLD: 0 K/UL (ref 0–0.2)
BASOPHILS NFR BLD: 0.3 % (ref 0–1)
BUN SERPL-MCNC: 24 MG/DL (ref 8–23)
CALCIUM SERPL-MCNC: 9.2 MG/DL (ref 8.8–10.2)
CHLORIDE SERPL-SCNC: 103 MMOL/L (ref 98–107)
CO2 SERPL-SCNC: 24 MMOL/L (ref 22–29)
CREAT SERPL-MCNC: 0.8 MG/DL (ref 0.5–0.9)
EKG P AXIS: 40 DEGREES
EKG P AXIS: NORMAL DEGREES
EKG P-R INTERVAL: 160 MS
EKG P-R INTERVAL: NORMAL MS
EKG Q-T INTERVAL: 300 MS
EKG Q-T INTERVAL: 442 MS
EKG QRS DURATION: 102 MS
EKG QRS DURATION: 102 MS
EKG QTC CALCULATION (BAZETT): 428 MS
EKG QTC CALCULATION (BAZETT): 454 MS
EKG T AXIS: 19 DEGREES
EKG T AXIS: 90 DEGREES
EOSINOPHIL # BLD: 0.2 K/UL (ref 0–0.6)
EOSINOPHIL NFR BLD: 2 % (ref 0–5)
ERYTHROCYTE [DISTWIDTH] IN BLOOD BY AUTOMATED COUNT: 12.7 % (ref 11.5–14.5)
GLUCOSE SERPL-MCNC: 91 MG/DL (ref 70–99)
HCT VFR BLD AUTO: 39.1 % (ref 37–47)
HGB BLD-MCNC: 12.6 G/DL (ref 12–16)
IMM GRANULOCYTES # BLD: 0 K/UL
LYMPHOCYTES # BLD: 2.8 K/UL (ref 1.1–4.5)
LYMPHOCYTES NFR BLD: 37.3 % (ref 20–40)
MCH RBC QN AUTO: 31.2 PG (ref 27–31)
MCHC RBC AUTO-ENTMCNC: 32.2 G/DL (ref 33–37)
MCV RBC AUTO: 96.8 FL (ref 81–99)
MONOCYTES # BLD: 0.9 K/UL (ref 0–0.9)
MONOCYTES NFR BLD: 11.7 % (ref 0–10)
NEUTROPHILS # BLD: 3.6 K/UL (ref 1.5–7.5)
NEUTS SEG NFR BLD: 48.4 % (ref 50–65)
PLATELET # BLD AUTO: 171 K/UL (ref 130–400)
PMV BLD AUTO: 10.9 FL (ref 9.4–12.3)
POTASSIUM SERPL-SCNC: 3.7 MMOL/L (ref 3.5–5)
RBC # BLD AUTO: 4.04 M/UL (ref 4.2–5.4)
SODIUM SERPL-SCNC: 140 MMOL/L (ref 136–145)
WBC # BLD AUTO: 7.5 K/UL (ref 4.8–10.8)

## 2025-01-28 PROCEDURE — 6370000000 HC RX 637 (ALT 250 FOR IP): Performed by: HOSPITALIST

## 2025-01-28 PROCEDURE — 80048 BASIC METABOLIC PNL TOTAL CA: CPT

## 2025-01-28 PROCEDURE — 36415 COLL VENOUS BLD VENIPUNCTURE: CPT

## 2025-01-28 PROCEDURE — 99232 SBSQ HOSP IP/OBS MODERATE 35: CPT | Performed by: INTERNAL MEDICINE

## 2025-01-28 PROCEDURE — 2500000003 HC RX 250 WO HCPCS: Performed by: HOSPITALIST

## 2025-01-28 PROCEDURE — 99222 1ST HOSP IP/OBS MODERATE 55: CPT | Performed by: INTERNAL MEDICINE

## 2025-01-28 PROCEDURE — 93010 ELECTROCARDIOGRAM REPORT: CPT | Performed by: INTERNAL MEDICINE

## 2025-01-28 PROCEDURE — 85025 COMPLETE CBC W/AUTO DIFF WBC: CPT

## 2025-01-28 RX ADMIN — OXYCODONE HYDROCHLORIDE AND ACETAMINOPHEN 250 MG: 500 TABLET ORAL at 08:56

## 2025-01-28 RX ADMIN — Medication 1000 UNITS: at 08:56

## 2025-01-28 RX ADMIN — LEVOTHYROXINE SODIUM 137 MCG: 137 TABLET ORAL at 08:10

## 2025-01-28 RX ADMIN — ANTACID TABLETS 500 MG: 500 TABLET, CHEWABLE ORAL at 08:55

## 2025-01-28 RX ADMIN — SPIRONOLACTONE 50 MG: 25 TABLET ORAL at 08:56

## 2025-01-28 RX ADMIN — FAMOTIDINE 40 MG: 20 TABLET ORAL at 08:56

## 2025-01-28 RX ADMIN — LOSARTAN POTASSIUM 50 MG: 50 TABLET, FILM COATED ORAL at 08:56

## 2025-01-28 RX ADMIN — SOTALOL HYDROCHLORIDE 80 MG: 80 TABLET ORAL at 08:56

## 2025-01-28 RX ADMIN — SODIUM CHLORIDE, PRESERVATIVE FREE 10 ML: 5 INJECTION INTRAVENOUS at 08:55

## 2025-01-28 RX ADMIN — APIXABAN 5 MG: 5 TABLET, FILM COATED ORAL at 08:56

## 2025-01-28 NOTE — PROGRESS NOTES
Physician Progress Note      PATIENT:               SERGIO BAILEY  CSN #:                  [id number]  :                       1955  ADMIT DATE:       2025 5:07 PM  DISCH DATE:  RESPONDING  PROVIDER #:        Julio Liao MD          QUERY TEXT:    Patient admitted with palpitations, noted to have atrial fibrillation and is   maintained on Eliquis. If possible, please document in progress notes and   discharge summary if you are evaluating and/or treating any of the following:?  ?  The medical record reflects the following:  Risk Factors: HTN. HLD, hypothyroidism.  Clinical Indicators: Afib, Pulse 146. Female HTN.  Treatment: Eliquis 5 mg b.i.d.    Thank you  Ary George RN, BSN, [facility]  [phone]  Options provided:  -- Secondary hypercoagulable state in a patient with atrial fibrillation  -- Other - I will add my own diagnosis  -- Disagree - Not applicable / Not valid  -- Disagree - Clinically unable to determine / Unknown  -- Refer to Clinical Documentation Reviewer    PROVIDER RESPONSE TEXT:    This patient has secondary hypercoagulable state in a patient with atrial   fibrillation.    Query created by: Ary George on 2025 9:06 AM      Electronically signed by:  Julio Liao MD 2025 12:55 PM          
 Daily Progress Note    Date:2025  Patient: Emerita Orourke  : 1955  MRN:[id number]  CODE:Full Code No additional code details  PCP:Rajesh Davis MD    Admit Date: 2025  5:07 PM   LOS: 1 day     Chief Complaint   Patient presents with    Atrial Fibrillation     Pt thinks she is back in afib, noticed palpitations at 1200         Subjective: She endorsed intermittent palpitations during my evaluation. She was still on Cardizem gtt at 2.5 mg/hr. Telemetry showed Afib with rates . She went to cath lab for DCCV later in the day.         Hospital Summary: 70 yo F with paroxysmal Afib, HTN, hypothyroidism who presented to [facility] ED with palpitations. She had a recent viral URI which she has since recovered from.  She was found to be in Afib with RVR with HR up to 140s. Given 2 doses of IV Cardizem, then placed on a Cardizem gtt.  Labs were overall unremarkable. CXR showed possible right lower lung atelectasis.   Pt had previous admission in 2024 for Afib with RVR believed to be secondary to Iatrogenic hyperthyroidism from over supplementation. TSH and FT4 were normal this admission.     Admitted to the hospitalist service for further management. Cardiology consulted.  DCCV performed 25 with successful restoration of sinus rhythm.   Sotalol was continued.         Review of Systems   All other systems reviewed and are negative.      Objective:      Vital signs in last 24 hours:  Patient Vitals for the past 24 hrs:   BP Temp Temp src Pulse Resp SpO2 Height Weight   25 1323 (!) 88/53 98.8 °F (37.1 °C) Temporal (!) 38 16 93 % -- --   25 1256 -- -- -- -- -- (!) 82 % -- --   25 1220 (!) 130/92 -- -- (!) 108 18 97 % -- --   25 1129 119/80 96.8 °F (36 °C) -- 97 14 96 % -- --   25 0600 -- -- -- -- -- -- -- 92.9 kg (204 lb 14.4 oz)   25 0435 123/72 (!) 96.3 °F (35.7 °C) Temporal 67 14 95 % -- --   25 0008 138/73 (!) 96.3 °F (35.7 °C) Temporal 67 16 97 % -- -- 
4 Eyes Skin Assessment     NAME:  Emerita Orourke  YOB: 1955  MEDICAL RECORD NUMBER:  [id number]    The patient is being assessed for  Admission    I agree that at least one RN has performed a thorough Head to Toe Skin Assessment on the patient. ALL assessment sites listed below have been assessed.      Areas assessed by both nurses:    Head, Face, Ears, Shoulders, Back, Chest, Arms, Elbows, Hands, Sacrum. Buttock, Coccyx, Ischium, and Legs. Feet and Heels        Does the Patient have a Wound? No noted wound(s)       Jonatan Prevention initiated by RN: No  Wound Care Orders initiated by RN: No    Pressure Injury (Stage 3,4, Unstageable, DTI, NWPT, and Complex wounds) if present, place Wound referral order by RN under : No    New Ostomies, if present place, Ostomy referral order under : No     Nurse 1 eSignature: Electronically signed by Clarie Cruz RN on 1/27/25 at 2:58 AM CST    **SHARE this note so that the co-signing nurse can place an eSignature**    Nurse 2 eSignature: {Esignature:[id number]}    
Emerita Orourke arrived to room # 715.   Presented with: atrial fibrillation with RVR  Mental Status: Patient is oriented, alert, coherent, logical, thought processes intact, and able to concentrate and follow conversation.   Vitals:    01/26/25 2129   BP: (!) 149/101   Pulse: (!) 114   Resp: 14   Temp: (!) 96.4 °F (35.8 °C)   SpO2: 95%     Patient safety contract and falls prevention contract reviewed with patient Yes.  Oriented Patient to room.  Call light within reach. Yes.  Needs, issues or concerns expressed at this time: no.      Electronically signed by Claire Cruz RN on 1/26/2025 at 9:36 PM    
Helped set up patient's home pap for HS  
PHARMACY NOTE:  Emerita Orourke was ordered fish oil.   As per the [facility] Formulary Committee policy, herbals and certain dietary supplements will be discontinued.  The herbal or dietary supplement may be continued after discharge from the hospital.    Electronically signed by Radha Wood RPH on 1/26/2025 at 10:03 PM    
Pt arrived to cath holding for cath lab hold. Pt to have DCCV per Dr. Kline. Procedure explained & reviewed. Consent already signed per pt. Pt A/) x 4 now. Skin W/D resp E/U. No family currently present.   
per day       TELEMETRY: Sinus     Physical Exam:      Physical Exam  Constitutional:       General: She is not in acute distress.     Appearance: She is obese. She is not diaphoretic.   HENT:      Mouth/Throat:      Pharynx: No oropharyngeal exudate.   Eyes:      General: No scleral icterus.        Right eye: No discharge.         Left eye: No discharge.   Neck:      Thyroid: No thyromegaly.      Vascular: No JVD.   Cardiovascular:      Rate and Rhythm: Normal rate and regular rhythm. No extrasystoles are present.     Heart sounds: Normal heart sounds, S1 normal and S2 normal. No murmur heard.     No systolic murmur is present.      No diastolic murmur is present.      No friction rub. No gallop. No S3 or S4 sounds.   Pulmonary:      Effort: Pulmonary effort is normal. No respiratory distress.      Breath sounds: Normal breath sounds. No wheezing or rales.   Chest:      Chest wall: No tenderness.   Abdominal:      General: Bowel sounds are normal. There is no distension.      Palpations: Abdomen is soft. There is no mass.      Tenderness: There is no abdominal tenderness. There is no guarding or rebound.      Hernia: No hernia is present.   Musculoskeletal:         General: Normal range of motion.   Skin:     General: Skin is warm.      Coloration: Skin is not pale.      Findings: No rash.   Neurological:      Mental Status: She is alert and oriented to person, place, and time.      Cranial Nerves: No cranial nerve deficit.      Deep Tendon Reflexes: Reflexes normal.                 Lab Data:  CBC:   Recent Labs     01/26/25  1719 01/27/25  0129 01/28/25  0250   WBC 8.0 7.7 7.5   HGB 13.7 13.2 12.6   HCT 40.9 40.2 39.1   MCV 93.8 95.7 96.8    182 171     BMP:   Recent Labs     01/26/25  1719 01/27/25  0129 01/28/25  0250    141 140   K 4.0 3.4* 3.7    105 103   CO2 24 23 24   PHOS 3.2  --   --    BUN 18 16 24*   CREATININE 0.6 0.7 0.8     LIVER PROFILE:   Recent Labs     01/26/25  1719   AST 24

## 2025-01-28 NOTE — PLAN OF CARE
Problem: Discharge Planning  Goal: Discharge to home or other facility with appropriate resources  1/28/2025 0931 by Heena Adkins RN  Outcome: Progressing  1/28/2025 0554 by Claire Cruz RN  Outcome: Progressing  Flowsheets (Taken 1/27/2025 2153)  Discharge to home or other facility with appropriate resources: Identify barriers to discharge with patient and caregiver     Problem: ABCDS Injury Assessment  Goal: Absence of physical injury  1/28/2025 0931 by Heena Adkins RN  Outcome: Progressing  1/28/2025 0554 by Claire Cruz, RN  Outcome: Progressing  Flowsheets (Taken 1/28/2025 0553)  Absence of Physical Injury: Implement safety measures based on patient assessment

## 2025-01-28 NOTE — PLAN OF CARE
Problem: Discharge Planning  Goal: Discharge to home or other facility with appropriate resources  Outcome: Progressing  Flowsheets (Taken 1/27/2025 5563)  Discharge to home or other facility with appropriate resources: Identify barriers to discharge with patient and caregiver     Problem: ABCDS Injury Assessment  Goal: Absence of physical injury  Outcome: Progressing  Flowsheets (Taken 1/28/2025 0559)  Absence of Physical Injury: Implement safety measures based on patient assessment

## 2025-01-28 NOTE — FLOWSHEET NOTE
01/28/25 1314   AVS Reviewed   AVS & discharge instructions reviewed with patient and/or representative? Yes   Reviewed instructions with Patient   Level of Understanding Questions answered;Teach back completed;Verbalized understanding;Return demonstration     Discharge instructions have been reviewed with patient.  Patient is agreeable to discharge at this time.  Patient is returning home via private vehicle with personal belongings.    Electronically signed by Heena Adkins RN on 1/28/2025 at 1:15 PM

## 2025-01-28 NOTE — CONSULTS
Cardiology Consult Note    ADMISSION DAY:  1/26/2025  5:07 PM   Consult Date:  1/28/2025 12:46 PM    Reason for Consultation: AF    History of Present Illness: 68 yo whom I follow in clinic presents with rapid fib. Her overall burden is low historically and she tolerates sotalol albeit with nocturnal bradycardia. We follow her via her implantable loop recorder. She was symptomatic w palpitations.     No Known Allergies    Current Medications  Current Facility-Administered Medications: dilTIAZem 125 mg in sodium chloride 0.9 % 125 mL infusion, 2.5-15 mg/hr, IntraVENous, Continuous  apixaban (ELIQUIS) tablet 5 mg, 5 mg, Oral, BID  ascorbic acid (VITAMIN C) tablet 250 mg, 250 mg, Oral, Daily  calcium carbonate (TUMS) chewable tablet 500 mg, 500 mg, Oral, BID  Vitamin D (CHOLECALCIFEROL) tablet 1,000 Units, 1,000 Units, Oral, Daily  labetalol (NORMODYNE;TRANDATE) injection 10 mg, 10 mg, IntraVENous, Q4H PRN  famotidine (PEPCID) tablet 40 mg, 40 mg, Oral, Daily  levothyroxine (SYNTHROID) tablet 137 mcg, 137 mcg, Oral, QAM AC  losartan (COZAAR) tablet 50 mg, 50 mg, Oral, BID  sotalol (BETAPACE) tablet 80 mg, 80 mg, Oral, BID  spironolactone (ALDACTONE) tablet 50 mg, 50 mg, Oral, Daily  sodium chloride flush 0.9 % injection 5-40 mL, 5-40 mL, IntraVENous, 2 times per day  sodium chloride flush 0.9 % injection 5-40 mL, 5-40 mL, IntraVENous, PRN  0.9 % sodium chloride infusion, , IntraVENous, PRN  potassium chloride (KLOR-CON M) extended release tablet 40 mEq, 40 mEq, Oral, PRN **OR** potassium bicarb-citric acid (EFFER-K) effervescent tablet 40 mEq, 40 mEq, Oral, PRN **OR** potassium chloride 10 mEq/100 mL IVPB (Peripheral Line), 10 mEq, IntraVENous, PRN  magnesium sulfate 2000 mg in 50 mL IVPB premix, 2,000 mg, IntraVENous, PRN  acetaminophen (TYLENOL) tablet 650 mg, 650 mg, Oral, Q4H PRN  promethazine (PHENERGAN) tablet 12.5 mg, 12.5 mg, Oral, Q6H PRN  sodium phosphate 15.09 mmol in sodium chloride 0.9 % 250 mL IVPB,

## 2025-01-28 NOTE — DISCHARGE SUMMARY
tablet  Commonly known as: PEPCID     fish oil 1000 MG capsule     levothyroxine 137 MCG tablet  Commonly known as: SYNTHROID  Take 1 tablet by mouth Daily     olmesartan 20 MG tablet  Commonly known as: Benicar  Take 1 tablet by mouth 2 times daily     sotalol 80 MG tablet  Commonly known as: BETAPACE  Take 1 tablet by mouth twice daily     spironolactone 50 MG tablet  Commonly known as: ALDACTONE     vitamin C 250 MG tablet     VITAMIN D3 PO                ISSUES TO BE ADDRESSED AT HOSPITAL FOLLOW-UP VISIT:    Advised patient to follow-up closely with PCP upon discharge for management of chronic medical issues    Condition on Discharge: stable  Discharge Disposition: Home    Recommended Follow Up:  No follow-up provider specified.  Followup Appointments Scheduled at Time of Discharge:  Future Appointments   Date Time Provider Department Center   2/12/2025  9:00 AM Kiara Boland APRN [facility]   2/25/2025  1:00 PM Sloan Malcolm MD [facility]   4/22/2025 10:30 AM Sloan Malcolm MD [facility]   10/3/2025 10:15 AM Clayton Kline MD [facility]        Discharge Instructions:   Please see the discharge paperwork.    Patient was seen at bedside today, and the examination shows improvement since yesterday.    Diet Instructions:  ADULT DIET; Regular     Detailed discharge directions delivered to the patient by myself and our nursing staff, who verbalizes understanding and is satisfied with the plan. Patient has been advised to continue all medications as prescribed and advised, and f/u with PCP within 1 week. Patient is stable from medical standpoint to be discharged.    Total time spent during patient evaluation and assessment, discussion with the nurse/family, addressing discharge medications/scripts and coordination of care for safe discharge was 32 minutes.      Signed Electronically:    Julio Liao MD  12:36 PM 1/28/2025     11-Jul-2021 17:34

## 2025-01-30 DIAGNOSIS — Z45.09 ENCOUNTER FOR LOOP RECORDER CHECK: Primary | ICD-10-CM

## 2025-01-30 DIAGNOSIS — I10 ESSENTIAL HYPERTENSION: ICD-10-CM

## 2025-01-30 DIAGNOSIS — I48.0 PAF (PAROXYSMAL ATRIAL FIBRILLATION) (HCC): ICD-10-CM

## 2025-02-12 ENCOUNTER — OFFICE VISIT (OUTPATIENT)
Dept: CARDIOLOGY CLINIC | Age: 70
End: 2025-02-12

## 2025-02-12 VITALS
HEART RATE: 60 BPM | DIASTOLIC BLOOD PRESSURE: 84 MMHG | WEIGHT: 215 LBS | HEIGHT: 65 IN | BODY MASS INDEX: 35.82 KG/M2 | SYSTOLIC BLOOD PRESSURE: 160 MMHG

## 2025-02-12 DIAGNOSIS — I48.0 PAF (PAROXYSMAL ATRIAL FIBRILLATION) (HCC): Primary | ICD-10-CM

## 2025-02-12 DIAGNOSIS — R00.1 BRADYCARDIA: ICD-10-CM

## 2025-02-12 DIAGNOSIS — I10 ESSENTIAL HYPERTENSION: ICD-10-CM

## 2025-02-12 DIAGNOSIS — Z45.09 ENCOUNTER FOR LOOP RECORDER CHECK: ICD-10-CM

## 2025-02-12 ASSESSMENT — ENCOUNTER SYMPTOMS
FACIAL SWELLING: 0
CHEST TIGHTNESS: 0
COUGH: 0
SHORTNESS OF BREATH: 0
WHEEZING: 0
ABDOMINAL PAIN: 0
VOMITING: 0
NAUSEA: 0
EYE REDNESS: 0

## 2025-02-12 NOTE — PROGRESS NOTES
[facility]  [facility] [address]  Phone: [phone]  Fax: [phone]    OFFICE VISIT:  2025    Emerita Orourke - : 1955    Reason For Visit:  Emerita is a 69 y.o. female who is here for Follow-up (No cardiac symptoms) and PAF (paroxysmal atrial fibrillation)       Diagnosis Orders   1. PAF (paroxysmal atrial fibrillation) (HCC)  EKG 12 lead      2. Essential hypertension  EKG 12 lead      3. Encounter for loop recorder check [Z45.09 (ICD-10-CM)]              HPI  Patient is here for hospital follow-up after admission with a recent viral URI triggering atrial fibrillation with RVR.  Previous admission for A-fib with RVR  believed to be secondary to hyperthyroidism and oversupplementation.  Patient had a cardioversion 2025 with restoration of sinus rhythm, sotalol continued.  Dr. Malcolm consulted for consideration of pacemaker.  A-fib burden felt to be low and patient wishes to continue present regimen with sotalol.  She has a loop recorder for monitoring    She denies any recurrence of the atrial fibrillation since hospital discharge and loop recorder shows nothing further.  She is remaining in sinus rhythm per EKG today.  She denies any chest pain or unusual dyspnea.    Blood pressure elevated in the office today, but notices better readings at home.  She has also missed her morning medication     Rajesh Davis MD is PCP.  Emerita Orourke has the following history as recorded in [facility]:    Patient Active Problem List    Diagnosis Date Noted    Atrial fibrillation with RVR (HCC) 2018    Essential hypertension     Rhinorrhea     Abnormal stress echo     Precordial pain     Encounter for loop recorder check [Z45.09 (ICD-10-CM)] 2025    GERD (gastroesophageal reflux disease) 2025    A-fib (HCC) 2024    Atrial fibrillation (HCC) 2021    Hyperglycemia 2021    Chronic anticoagulation 2020    PAF (paroxysmal  [>50% of Time Spent on Counseling and Coordination of Care for  ___] : Greater than 50% of the encounter time was spent on counseling and coordination of care for [unfilled]

## 2025-02-19 RX ORDER — SOTALOL HYDROCHLORIDE 80 MG/1
80 TABLET ORAL 2 TIMES DAILY
Qty: 180 TABLET | Refills: 3 | Status: SHIPPED | OUTPATIENT
Start: 2025-02-19

## 2025-03-13 ENCOUNTER — RESULTS FOLLOW-UP (OUTPATIENT)
Dept: CARDIOLOGY CLINIC | Age: 70
End: 2025-03-13

## 2025-03-13 DIAGNOSIS — I48.0 PAF (PAROXYSMAL ATRIAL FIBRILLATION) (HCC): ICD-10-CM

## 2025-03-13 DIAGNOSIS — R00.1 BRADYCARDIA: ICD-10-CM

## 2025-03-13 DIAGNOSIS — I10 ESSENTIAL HYPERTENSION: ICD-10-CM

## 2025-03-13 DIAGNOSIS — Z45.09 ENCOUNTER FOR LOOP RECORDER CHECK: Primary | ICD-10-CM

## 2025-04-17 ENCOUNTER — RESULTS FOLLOW-UP (OUTPATIENT)
Dept: CARDIOLOGY CLINIC | Age: 70
End: 2025-04-17

## 2025-04-17 DIAGNOSIS — I48.0 PAF (PAROXYSMAL ATRIAL FIBRILLATION) (HCC): ICD-10-CM

## 2025-04-17 DIAGNOSIS — Z45.09 ENCOUNTER FOR LOOP RECORDER CHECK: Primary | ICD-10-CM

## 2025-04-17 DIAGNOSIS — I10 ESSENTIAL HYPERTENSION: ICD-10-CM

## 2025-04-17 DIAGNOSIS — R00.1 BRADYCARDIA: ICD-10-CM

## 2025-04-22 ENCOUNTER — OFFICE VISIT (OUTPATIENT)
Dept: CARDIOLOGY CLINIC | Age: 70
End: 2025-04-22
Payer: MEDICARE

## 2025-04-22 VITALS
SYSTOLIC BLOOD PRESSURE: 152 MMHG | BODY MASS INDEX: 35.32 KG/M2 | WEIGHT: 212 LBS | DIASTOLIC BLOOD PRESSURE: 78 MMHG | HEART RATE: 69 BPM | HEIGHT: 65 IN

## 2025-04-22 DIAGNOSIS — Z79.01 CHRONIC ANTICOAGULATION: ICD-10-CM

## 2025-04-22 DIAGNOSIS — I48.0 PAROXYSMAL ATRIAL FIBRILLATION (HCC): Primary | ICD-10-CM

## 2025-04-22 PROCEDURE — 3077F SYST BP >= 140 MM HG: CPT | Performed by: INTERNAL MEDICINE

## 2025-04-22 PROCEDURE — 3017F COLORECTAL CA SCREEN DOC REV: CPT | Performed by: INTERNAL MEDICINE

## 2025-04-22 PROCEDURE — 3078F DIAST BP <80 MM HG: CPT | Performed by: INTERNAL MEDICINE

## 2025-04-22 PROCEDURE — G8400 PT W/DXA NO RESULTS DOC: HCPCS | Performed by: INTERNAL MEDICINE

## 2025-04-22 PROCEDURE — G8427 DOCREV CUR MEDS BY ELIG CLIN: HCPCS | Performed by: INTERNAL MEDICINE

## 2025-04-22 PROCEDURE — 1090F PRES/ABSN URINE INCON ASSESS: CPT | Performed by: INTERNAL MEDICINE

## 2025-04-22 PROCEDURE — 1036F TOBACCO NON-USER: CPT | Performed by: INTERNAL MEDICINE

## 2025-04-22 PROCEDURE — 1159F MED LIST DOCD IN RCRD: CPT | Performed by: INTERNAL MEDICINE

## 2025-04-22 PROCEDURE — 1123F ACP DISCUSS/DSCN MKR DOCD: CPT | Performed by: INTERNAL MEDICINE

## 2025-04-22 PROCEDURE — 99214 OFFICE O/P EST MOD 30 MIN: CPT | Performed by: INTERNAL MEDICINE

## 2025-04-22 PROCEDURE — G8417 CALC BMI ABV UP PARAM F/U: HCPCS | Performed by: INTERNAL MEDICINE

## 2025-04-22 NOTE — PROGRESS NOTES
(5' 5\").    Weight as of this encounter: 96.2 kg (212 lb).    Constitutional: She is oriented to person, place, and time. She appears well-developed and well-nourished in no acute distress.  Neck:  Neck supple without JVD present. No trachea deviation present. No thyromegaly present.   Eyes:Conjunctivae and EOM are normal. Pupils equal and reactive to light.   ENT:Hearing appears normal.conjunctiva and lids are normal, ears and nose appear normal.  Cardiovascular: Normal rate, S1-S2 regular rhythm, normal heart sounds.  No murmur ascultated.  No gallop and no friction rub.  No carotid bruits.  No peripheral edema.    Pulmonary/Chest:  Lungs clear to auscultation bilaterally without evidence of respiratory distress.  She without wheezes. She without rales or ronchi.   Musculoskeletal: Normal range of motion.  Gait is normal  Head is normocephalic and atraumatic.  Skin: Skin is warm and dry without rash or pallor.   Psychiatric:She is alert and oriented to person, place, and time.  She has a normal mood and affect. Her behavior is normal. Thought content normal.       Lab Results   Component Value Date/Time    CREATININE 0.8 01/28/2025 02:50 AM    CREATININE 0.7 01/27/2025 01:29 AM    CREATININE 0.6 01/26/2025 05:19 PM    HGB 12.6 01/28/2025 02:50 AM    HGB 13.2 01/27/2025 01:29 AM    HGB 13.7 01/26/2025 05:19 PM    PROBNP 665 01/26/2025 05:19 PM    PROBNP 59 02/13/2023 05:15 AM    PROBNP 134 09/18/2021 09:45 PM            Assessment, Recommendations, & Plan:  69 y.o. female with atrial fibrillation and nocturnal bradycardia.  She is anticoagulated with apixaban.  She is tolerating the sotalol without problems and she is having no atrial fibrillation on it.  She is having nocturnal bradycardia down into the 30s but this has not caused any symptoms.  I will see her back in 6 months and we will see if any symptoms have developed.  She would be a candidate for watchman as well down the road.        Disposition - RTC in 6

## 2025-05-28 ENCOUNTER — RESULTS FOLLOW-UP (OUTPATIENT)
Dept: CARDIOLOGY CLINIC | Age: 70
End: 2025-05-28

## 2025-05-28 DIAGNOSIS — Z45.09 ENCOUNTER FOR LOOP RECORDER CHECK: Primary | ICD-10-CM

## 2025-05-28 DIAGNOSIS — R00.1 BRADYCARDIA: ICD-10-CM

## 2025-05-28 DIAGNOSIS — I48.0 PAROXYSMAL ATRIAL FIBRILLATION (HCC): ICD-10-CM

## 2025-05-28 PROCEDURE — 93298 REM INTERROG DEV EVAL SCRMS: CPT | Performed by: CLINICAL NURSE SPECIALIST

## 2025-06-30 DIAGNOSIS — I48.0 PAF (PAROXYSMAL ATRIAL FIBRILLATION) (HCC): ICD-10-CM

## 2025-06-30 DIAGNOSIS — Z45.09 ENCOUNTER FOR LOOP RECORDER CHECK: Primary | ICD-10-CM

## 2025-06-30 DIAGNOSIS — R00.1 BRADYCARDIA: ICD-10-CM

## 2025-06-30 PROCEDURE — 93298 REM INTERROG DEV EVAL SCRMS: CPT | Performed by: CLINICAL NURSE SPECIALIST

## 2025-07-01 ENCOUNTER — RESULTS FOLLOW-UP (OUTPATIENT)
Dept: CARDIOLOGY CLINIC | Age: 70
End: 2025-07-01

## 2025-07-15 ENCOUNTER — APPOINTMENT (OUTPATIENT)
Dept: GENERAL RADIOLOGY | Age: 70
DRG: 309 | End: 2025-07-15
Payer: MEDICARE

## 2025-07-15 ENCOUNTER — HOSPITAL ENCOUNTER (INPATIENT)
Age: 70
LOS: 1 days | Discharge: HOME | DRG: 309 | End: 2025-07-16
Attending: EMERGENCY MEDICINE | Admitting: STUDENT IN AN ORGANIZED HEALTH CARE EDUCATION/TRAINING PROGRAM
Payer: MEDICARE

## 2025-07-15 ENCOUNTER — APPOINTMENT (OUTPATIENT)
Age: 70
DRG: 309 | End: 2025-07-15
Payer: MEDICARE

## 2025-07-15 DIAGNOSIS — I48.0 PAF (PAROXYSMAL ATRIAL FIBRILLATION) (HCC): ICD-10-CM

## 2025-07-15 DIAGNOSIS — I48.0 PAROXYSMAL ATRIAL FIBRILLATION (HCC): ICD-10-CM

## 2025-07-15 DIAGNOSIS — I48.91 ATRIAL FIBRILLATION, UNSPECIFIED TYPE (HCC): Primary | ICD-10-CM

## 2025-07-15 LAB
ALBUMIN SERPL-MCNC: 4.3 G/DL (ref 3.5–5.2)
ALP SERPL-CCNC: 109 U/L (ref 35–104)
ALT SERPL-CCNC: 27 U/L (ref 10–35)
ANION GAP SERPL CALCULATED.3IONS-SCNC: 11 MMOL/L (ref 8–16)
AST SERPL-CCNC: 35 U/L (ref 10–35)
BASOPHILS # BLD: 0 K/UL (ref 0–0.2)
BASOPHILS NFR BLD: 0.4 % (ref 0–1)
BILIRUB SERPL-MCNC: 0.5 MG/DL (ref 0.2–1.2)
BUN SERPL-MCNC: 13 MG/DL (ref 8–23)
CALCIUM SERPL-MCNC: 10 MG/DL (ref 8.8–10.2)
CHLORIDE SERPL-SCNC: 107 MMOL/L (ref 98–107)
CO2 SERPL-SCNC: 25 MMOL/L (ref 22–29)
CREAT SERPL-MCNC: 0.6 MG/DL (ref 0.5–0.9)
EKG P AXIS: NORMAL DEGREES
EKG P AXIS: NORMAL DEGREES
EKG P-R INTERVAL: NORMAL MS
EKG P-R INTERVAL: NORMAL MS
EKG Q-T INTERVAL: 320 MS
EKG Q-T INTERVAL: 358 MS
EKG QRS DURATION: 110 MS
EKG QRS DURATION: 120 MS
EKG QTC CALCULATION (BAZETT): 433 MS
EKG QTC CALCULATION (BAZETT): 446 MS
EKG T AXIS: 60 DEGREES
EKG T AXIS: 99 DEGREES
EOSINOPHIL # BLD: 0.1 K/UL (ref 0–0.6)
EOSINOPHIL NFR BLD: 1.1 % (ref 0–5)
ERYTHROCYTE [DISTWIDTH] IN BLOOD BY AUTOMATED COUNT: 12 % (ref 11.5–14.5)
GLUCOSE SERPL-MCNC: 142 MG/DL (ref 70–99)
HCT VFR BLD AUTO: 40.4 % (ref 37–47)
HGB BLD-MCNC: 13.7 G/DL (ref 12–16)
IMM GRANULOCYTES # BLD: 0 K/UL
LYMPHOCYTES # BLD: 1.6 K/UL (ref 1.1–4.5)
LYMPHOCYTES NFR BLD: 33.8 % (ref 20–40)
MAGNESIUM SERPL-MCNC: 1.9 MG/DL (ref 1.6–2.4)
MCH RBC QN AUTO: 31.4 PG (ref 27–31)
MCHC RBC AUTO-ENTMCNC: 33.9 G/DL (ref 33–37)
MCV RBC AUTO: 92.4 FL (ref 81–99)
MONOCYTES # BLD: 0.6 K/UL (ref 0–0.9)
MONOCYTES NFR BLD: 13.6 % (ref 0–10)
NEUTROPHILS # BLD: 2.4 K/UL (ref 1.5–7.5)
NEUTS SEG NFR BLD: 50.9 % (ref 50–65)
PLATELET # BLD AUTO: 166 K/UL (ref 130–400)
PMV BLD AUTO: 10.6 FL (ref 9.4–12.3)
POTASSIUM SERPL-SCNC: 3.9 MMOL/L (ref 3.5–5.1)
PROT SERPL-MCNC: 7.2 G/DL (ref 6.4–8.3)
RBC # BLD AUTO: 4.37 M/UL (ref 4.2–5.4)
SODIUM SERPL-SCNC: 143 MMOL/L (ref 136–145)
T4 FREE SERPL-MCNC: 1.83 NG/DL (ref 0.93–1.7)
TROPONIN, HIGH SENSITIVITY: 17 NG/L (ref 0–14)
TROPONIN, HIGH SENSITIVITY: 19 NG/L (ref 0–14)
TSH SERPL DL<=0.005 MIU/L-ACNC: 1.42 UIU/ML (ref 0.27–4.2)
WBC # BLD AUTO: 4.6 K/UL (ref 4.8–10.8)

## 2025-07-15 PROCEDURE — 71045 X-RAY EXAM CHEST 1 VIEW: CPT

## 2025-07-15 PROCEDURE — 84439 ASSAY OF FREE THYROXINE: CPT

## 2025-07-15 PROCEDURE — 6360000002 HC RX W HCPCS: Performed by: EMERGENCY MEDICINE

## 2025-07-15 PROCEDURE — 96376 TX/PRO/DX INJ SAME DRUG ADON: CPT

## 2025-07-15 PROCEDURE — 93005 ELECTROCARDIOGRAM TRACING: CPT | Performed by: EMERGENCY MEDICINE

## 2025-07-15 PROCEDURE — 2500000003 HC RX 250 WO HCPCS: Performed by: NURSE PRACTITIONER

## 2025-07-15 PROCEDURE — 99222 1ST HOSP IP/OBS MODERATE 55: CPT | Performed by: INTERNAL MEDICINE

## 2025-07-15 PROCEDURE — 36415 COLL VENOUS BLD VENIPUNCTURE: CPT

## 2025-07-15 PROCEDURE — 85025 COMPLETE CBC W/AUTO DIFF WBC: CPT

## 2025-07-15 PROCEDURE — 6370000000 HC RX 637 (ALT 250 FOR IP): Performed by: NURSE PRACTITIONER

## 2025-07-15 PROCEDURE — 1200000000 HC SEMI PRIVATE

## 2025-07-15 PROCEDURE — 99285 EMERGENCY DEPT VISIT HI MDM: CPT

## 2025-07-15 PROCEDURE — C8929 TTE W OR WO FOL WCON,DOPPLER: HCPCS

## 2025-07-15 PROCEDURE — 96374 THER/PROPH/DIAG INJ IV PUSH: CPT

## 2025-07-15 PROCEDURE — 2580000003 HC RX 258: Performed by: NURSE PRACTITIONER

## 2025-07-15 PROCEDURE — 84443 ASSAY THYROID STIM HORMONE: CPT

## 2025-07-15 PROCEDURE — 2580000003 HC RX 258: Performed by: EMERGENCY MEDICINE

## 2025-07-15 PROCEDURE — 83735 ASSAY OF MAGNESIUM: CPT

## 2025-07-15 PROCEDURE — 84484 ASSAY OF TROPONIN QUANT: CPT

## 2025-07-15 PROCEDURE — 6360000004 HC RX CONTRAST MEDICATION: Performed by: INTERNAL MEDICINE

## 2025-07-15 PROCEDURE — 93010 ELECTROCARDIOGRAM REPORT: CPT | Performed by: INTERNAL MEDICINE

## 2025-07-15 PROCEDURE — 80053 COMPREHEN METABOLIC PANEL: CPT

## 2025-07-15 RX ORDER — SODIUM CHLORIDE 0.9 % (FLUSH) 0.9 %
5-40 SYRINGE (ML) INJECTION PRN
Status: DISCONTINUED | OUTPATIENT
Start: 2025-07-15 | End: 2025-07-16 | Stop reason: HOSPADM

## 2025-07-15 RX ORDER — SODIUM CHLORIDE 0.9 % (FLUSH) 0.9 %
5-40 SYRINGE (ML) INJECTION EVERY 12 HOURS SCHEDULED
Status: DISCONTINUED | OUTPATIENT
Start: 2025-07-15 | End: 2025-07-16 | Stop reason: HOSPADM

## 2025-07-15 RX ORDER — LOSARTAN POTASSIUM 50 MG/1
50 TABLET ORAL DAILY
Status: DISCONTINUED | OUTPATIENT
Start: 2025-07-15 | End: 2025-07-16 | Stop reason: HOSPADM

## 2025-07-15 RX ORDER — CALCIUM CARBONATE 500(1250)
500 TABLET ORAL DAILY
Status: DISCONTINUED | OUTPATIENT
Start: 2025-07-15 | End: 2025-07-16 | Stop reason: HOSPADM

## 2025-07-15 RX ORDER — VITAMIN B COMPLEX
1000 TABLET ORAL DAILY
Status: DISCONTINUED | OUTPATIENT
Start: 2025-07-15 | End: 2025-07-16 | Stop reason: HOSPADM

## 2025-07-15 RX ORDER — MAGNESIUM SULFATE IN WATER 40 MG/ML
2000 INJECTION, SOLUTION INTRAVENOUS PRN
Status: DISCONTINUED | OUTPATIENT
Start: 2025-07-15 | End: 2025-07-16 | Stop reason: HOSPADM

## 2025-07-15 RX ORDER — ACETAMINOPHEN 650 MG/1
650 SUPPOSITORY RECTAL EVERY 6 HOURS PRN
Status: DISCONTINUED | OUTPATIENT
Start: 2025-07-15 | End: 2025-07-16 | Stop reason: HOSPADM

## 2025-07-15 RX ORDER — DILTIAZEM HYDROCHLORIDE 5 MG/ML
10 INJECTION INTRAVENOUS ONCE
Status: COMPLETED | OUTPATIENT
Start: 2025-07-15 | End: 2025-07-15

## 2025-07-15 RX ORDER — SODIUM CHLORIDE 9 MG/ML
INJECTION, SOLUTION INTRAVENOUS PRN
Status: DISCONTINUED | OUTPATIENT
Start: 2025-07-15 | End: 2025-07-16 | Stop reason: HOSPADM

## 2025-07-15 RX ORDER — ACETAMINOPHEN 325 MG/1
650 TABLET ORAL EVERY 6 HOURS PRN
Status: DISCONTINUED | OUTPATIENT
Start: 2025-07-15 | End: 2025-07-16 | Stop reason: HOSPADM

## 2025-07-15 RX ORDER — CHLORAL HYDRATE 500 MG
1 CAPSULE ORAL 2 TIMES DAILY
Status: DISCONTINUED | OUTPATIENT
Start: 2025-07-15 | End: 2025-07-15

## 2025-07-15 RX ORDER — POLYETHYLENE GLYCOL 3350 17 G/17G
17 POWDER, FOR SOLUTION ORAL DAILY PRN
Status: DISCONTINUED | OUTPATIENT
Start: 2025-07-15 | End: 2025-07-16 | Stop reason: HOSPADM

## 2025-07-15 RX ORDER — POTASSIUM CHLORIDE 7.45 MG/ML
10 INJECTION INTRAVENOUS PRN
Status: DISCONTINUED | OUTPATIENT
Start: 2025-07-15 | End: 2025-07-16 | Stop reason: HOSPADM

## 2025-07-15 RX ORDER — SPIRONOLACTONE 50 MG/1
50 TABLET, FILM COATED ORAL DAILY
Status: DISCONTINUED | OUTPATIENT
Start: 2025-07-15 | End: 2025-07-16 | Stop reason: HOSPADM

## 2025-07-15 RX ORDER — FAMOTIDINE 20 MG/1
40 TABLET, FILM COATED ORAL DAILY
Status: DISCONTINUED | OUTPATIENT
Start: 2025-07-15 | End: 2025-07-16 | Stop reason: HOSPADM

## 2025-07-15 RX ORDER — POTASSIUM CHLORIDE 1500 MG/1
40 TABLET, EXTENDED RELEASE ORAL PRN
Status: DISCONTINUED | OUTPATIENT
Start: 2025-07-15 | End: 2025-07-16 | Stop reason: HOSPADM

## 2025-07-15 RX ORDER — LEVOTHYROXINE SODIUM 137 UG/1
137 TABLET ORAL DAILY
Status: DISCONTINUED | OUTPATIENT
Start: 2025-07-16 | End: 2025-07-16 | Stop reason: HOSPADM

## 2025-07-15 RX ORDER — SOTALOL HYDROCHLORIDE 80 MG/1
80 TABLET ORAL 2 TIMES DAILY
Status: DISCONTINUED | OUTPATIENT
Start: 2025-07-15 | End: 2025-07-16 | Stop reason: HOSPADM

## 2025-07-15 RX ORDER — DILTIAZEM HYDROCHLORIDE 30 MG/1
30 TABLET, FILM COATED ORAL EVERY 6 HOURS SCHEDULED
Status: DISCONTINUED | OUTPATIENT
Start: 2025-07-15 | End: 2025-07-16 | Stop reason: HOSPADM

## 2025-07-15 RX ORDER — ASCORBIC ACID 500 MG
250 TABLET ORAL DAILY
Status: DISCONTINUED | OUTPATIENT
Start: 2025-07-15 | End: 2025-07-16 | Stop reason: HOSPADM

## 2025-07-15 RX ADMIN — DILTIAZEM HYDROCHLORIDE 30 MG: 30 TABLET, FILM COATED ORAL at 16:47

## 2025-07-15 RX ADMIN — APIXABAN 5 MG: 5 TABLET, FILM COATED ORAL at 20:34

## 2025-07-15 RX ADMIN — DILTIAZEM HYDROCHLORIDE 10 MG: 5 INJECTION, SOLUTION INTRAVENOUS at 10:07

## 2025-07-15 RX ADMIN — SODIUM CHLORIDE 15 ML/HR: 0.9 INJECTION, SOLUTION INTRAVENOUS at 20:35

## 2025-07-15 RX ADMIN — DILTIAZEM HYDROCHLORIDE 7.5 MG/HR: 5 INJECTION, SOLUTION INTRAVENOUS at 12:01

## 2025-07-15 RX ADMIN — DILTIAZEM HYDROCHLORIDE 30 MG: 30 TABLET, FILM COATED ORAL at 14:09

## 2025-07-15 RX ADMIN — DILTIAZEM HYDROCHLORIDE 30 MG: 30 TABLET, FILM COATED ORAL at 23:57

## 2025-07-15 RX ADMIN — DILTIAZEM HYDROCHLORIDE 10 MG: 5 INJECTION INTRAVENOUS at 09:25

## 2025-07-15 RX ADMIN — FAMOTIDINE 40 MG: 20 TABLET, FILM COATED ORAL at 14:09

## 2025-07-15 RX ADMIN — SULFUR HEXAFLUORIDE 2 ML: 60.7; .19; .19 INJECTION, POWDER, LYOPHILIZED, FOR SUSPENSION INTRAVENOUS; INTRAVESICAL at 15:50

## 2025-07-15 RX ADMIN — SOTALOL HYDROCHLORIDE 80 MG: 80 TABLET ORAL at 20:34

## 2025-07-15 RX ADMIN — Medication 10 ML: at 21:02

## 2025-07-15 NOTE — CONSULTS
Cardiology Consult Note    ADMISSION DAY:  7/15/2025  8:38 AM   Consult Date:  7/15/2025 3:49 PM    Reason for Consultation: Recurrent atrial fibrillation    History of Present Illness: The patient reports she awakened this morning with A-fib.  It initiated spontaneously and she denies any change in her intake of stimulants such as caffeine recently.  She denies any other change in her wellbeing recently.    No Known Allergies    Current Medications  Current Facility-Administered Medications: dilTIAZem 125 mg in sodium chloride 0.9 % 125 mL infusion, 2.5-15 mg/hr, IntraVENous, Continuous  apixaban (ELIQUIS) tablet 5 mg, 5 mg, Oral, BID  ascorbic acid (VITAMIN C) tablet 250 mg, 250 mg, Oral, Daily  calcium elemental (OSCAL) tablet 500 mg, 500 mg, Oral, Daily  famotidine (PEPCID) tablet 40 mg, 40 mg, Oral, Daily  [START ON 7/16/2025] levothyroxine (SYNTHROID) tablet 137 mcg, 137 mcg, Oral, Daily  [Held by provider] losartan (COZAAR) tablet 50 mg, 50 mg, Oral, Daily  sotalol (BETAPACE) tablet 80 mg, 80 mg, Oral, BID  [Held by provider] spironolactone (ALDACTONE) tablet 50 mg, 50 mg, Oral, Daily  sodium chloride flush 0.9 % injection 5-40 mL, 5-40 mL, IntraVENous, 2 times per day  sodium chloride flush 0.9 % injection 5-40 mL, 5-40 mL, IntraVENous, PRN  0.9 % sodium chloride infusion, , IntraVENous, PRN  potassium chloride (KLOR-CON M) extended release tablet 40 mEq, 40 mEq, Oral, PRN **OR** potassium bicarb-citric acid (EFFER-K) effervescent tablet 40 mEq, 40 mEq, Oral, PRN **OR** potassium chloride 10 mEq/100 mL IVPB (Peripheral Line), 10 mEq, IntraVENous, PRN  magnesium sulfate 2000 mg in 50 mL IVPB premix, 2,000 mg, IntraVENous, PRN  polyethylene glycol (GLYCOLAX) packet 17 g, 17 g, Oral, Daily PRN  acetaminophen (TYLENOL) tablet 650 mg, 650 mg, Oral, Q6H PRN **OR** acetaminophen (TYLENOL) suppository 650 mg, 650 mg, Rectal, Q6H PRN  sulfur hexafluoride microspheres (LUMASON) 60.7-25 MG injection 2 mL, 2 mL,

## 2025-07-15 NOTE — PROGRESS NOTES
4 Eyes Skin Assessment     NAME:  Emerita Orourke  YOB: 1955  MEDICAL RECORD NUMBER:  [id number]    The patient is being assessed for  Admission    I agree that at least one RN has performed a thorough Head to Toe Skin Assessment on the patient. ALL assessment sites listed below have been assessed.      Areas assessed by both nurses:    Head, Face, Ears, Shoulders, Back, Chest, Arms, Elbows, Hands, Sacrum. Buttock, Coccyx, Ischium, Legs. Feet and Heels, and Under Medical Devices         Does the Patient have a Wound? No noted wound(s)       Jonatan Prevention initiated by RN: No  Wound Care Orders initiated by RN: No    Pressure Injury (Stage 1,2,3,4, Unstageable, DTI, NWPT, and Complex wounds) if present, place Wound referral order by RN under : No    New Ostomies, if present place, Ostomy referral order under : No     Nurse 1 eSignature: Electronically signed by EDWARD JARVIS RN on 7/15/25 at 4:38 PM CDT    **SHARE this note so that the co-signing nurse can place an eSignature**    Nurse 2 eSignature: {Esignature:[id number]}

## 2025-07-15 NOTE — PROGRESS NOTES
Handoff report called to ONEYDA Pratt on the 4th floor.    Electronically signed by Edel Gooden RN on 7/15/2025 at 4:21 PM

## 2025-07-15 NOTE — PROGRESS NOTES
PHARMACY NOTE:  [facility] P[employer]T approved policy Herbal and Nutritional Product Restrictions states that non-formulary herbal, alternative, and nutritional/dietary supplements will be discontinued.      Emerita Orourke was ordered fish oil 1,000 mg capsules, which has been discontinued per policy.     The herbal or dietary supplement may be continued after discharge from the hospital.    Electronically signed by SHARLENE COBB RPH on 7/15/2025 at 1:42 PM

## 2025-07-15 NOTE — ED PROVIDER NOTES
[facility] EMERGENCY DEPARTMENT  eMERGENCY dEPARTMENT eNCOUnter      Pt Name: Emerita Orourke  MRN: [id number]  Birthdate 1955  Date of evaluation: 7/15/2025  Provider: Carmen Medina DO    CHIEF COMPLAINT       Chief Complaint   Patient presents with    Atrial Fibrillation         HISTORY OF PRESENT ILLNESS   (Location/Symptom, Timing/Onset,Context/Setting, Quality, Duration, Modifying Factors, Severity)  Note limiting factors.   Emerita Orourke is a 69 y.o. female who presents to the emergency department     The patient is a 1-year-old female who presents the emergency department complaining of atrial fibrillation.  She does have a history of atrial fibrillation.  She says she takes sotalol and is anticoagulated on Eliquis.  She has been compliant with her medications.  She says she sees both Dr. Kline and Dr. Malcolm.  She says she woke up today and could feel that she was in atrial fibrillation.  She reports palpitations.  A little lightheaded at times.  She denies any chest pain or shortness of breath.  No nausea, vomiting, diarrhea.  Denies any recent illness.  She says she has required cardioversion several times in the past.  There are no other complaints or concerns at this time.            NursingNotes were reviewed.    REVIEW OF SYSTEMS    (2-9 systems for level 4, 10 or more for level 5)     As per HPI         PAST MEDICALHISTORY     Past Medical History:   Diagnosis Date    Atrial fibrillation (HCC)     Disease of blood and blood forming organ     DVT in right leg     GERD (gastroesophageal reflux disease) 1/26/2025    Hyperlipidemia 3/17/2015    Hypertension     Hyperthyroidism     Sleep apnea     Umbilical hernia          SURGICAL HISTORY       Past Surgical History:   Procedure Laterality Date    ABDOMEN SURGERY      CARDIAC CATHETERIZATION      COLONOSCOPY      CYSTOSCOPY      EP DEVICE PROCEDURE N/A 1/27/2025    Ep cardioversion performed by Clayton Kline MD at [facility] CARDIAC CATH LAB

## 2025-07-15 NOTE — H&P
[facility]      Hospitalist - History & Physical      PCP: aRjesh Davis MD    Date of Admission: 7/15/2025    Date of Service: 7/15/2025    Chief Complaint:  palpitations    History Of Present Illness:   The patient is a 69 y.o. female with a PMH of DVT, PAF on chronic anticoagulation with Eliquis, s/p loop recorder, HLD, GERD, HTN, thyroid disease who presented to [facility] ED on 7/15/2025 complaining of palpitations concern for atrial fibrillation. She states that she awoke when her alarm clock when off and she felt fatigued. She additionally states that she felt generally weak with mild lightheadedness-she states these are typical symptoms of atrial fibrillation in the past. She denies any recent illness including cough, chills, fatigue or fevers. Denies chest pain, overt shortness of breath or weight gain. She reports compliance to all medications without any missed doses. She has required a previous required cardioversion in 1/25 per Dr. Kline.     Further ED work-up revealed-initial EKG-atrial fibrillation with rvr-rate 132 Left axis, Incomplete RBBB Anterior infarct - age undetermined,Left ventricular hypertrophy, and lateral ST-T abnormality.  Chemistries were relatively other than mild hyperglycemia-glucose-142. Trop 17->19. Alk phos-109. WBC-4.6, H&H-13.7/40.4 and plt-166. Chest x-ray-The cardiac silhouette is prominent.  The aortic arch atherosclerotic..  There is no consolidation or effusion.  Pulmonary vasculature is normal. There is a stable loop recording device. She was given 2 diltiazem 10mg pushes in the ED with little improvement in her rate control. She was placed on a Diltiazem gtt with some improvement in her rate. She will be admitted to hospital medicine for atrial fibrillation with RVR-electrophysiology will be consulted.        Past Medical History:        Diagnosis Date    Atrial fibrillation (HCC)     Disease of blood and blood forming organ     DVT in right leg     GERD

## 2025-07-16 ENCOUNTER — ANESTHESIA EVENT (OUTPATIENT)
Age: 70
DRG: 309 | End: 2025-07-16
Payer: MEDICARE

## 2025-07-16 ENCOUNTER — ANESTHESIA (OUTPATIENT)
Age: 70
DRG: 309 | End: 2025-07-16
Payer: MEDICARE

## 2025-07-16 VITALS
TEMPERATURE: 97.5 F | HEIGHT: 65 IN | RESPIRATION RATE: 16 BRPM | DIASTOLIC BLOOD PRESSURE: 60 MMHG | SYSTOLIC BLOOD PRESSURE: 107 MMHG | OXYGEN SATURATION: 99 % | HEART RATE: 48 BPM | BODY MASS INDEX: 35.32 KG/M2 | WEIGHT: 212 LBS

## 2025-07-16 LAB
ANION GAP SERPL CALCULATED.3IONS-SCNC: 11 MMOL/L (ref 8–16)
BASOPHILS # BLD: 0 K/UL (ref 0–0.2)
BASOPHILS NFR BLD: 0.3 % (ref 0–1)
BUN SERPL-MCNC: 18 MG/DL (ref 8–23)
CALCIUM SERPL-MCNC: 9.5 MG/DL (ref 8.8–10.2)
CHLORIDE SERPL-SCNC: 105 MMOL/L (ref 98–107)
CO2 SERPL-SCNC: 23 MMOL/L (ref 22–29)
CREAT SERPL-MCNC: 0.7 MG/DL (ref 0.5–0.9)
ECHO AO ASC DIAM: 2.6 CM
ECHO AO ASCENDING AORTA INDEX: 1.28 CM/M2
ECHO AO ROOT DIAM: 1.8 CM
ECHO AO ROOT INDEX: 0.89 CM/M2
ECHO AO SINUS VALSALVA DIAM: 3.1 CM
ECHO AO SINUS VALSALVA INDEX: 1.53 CM/M2
ECHO AO ST JNCT DIAM: 2.8 CM
ECHO AV AREA PEAK VELOCITY: 2.7 CM2
ECHO AV AREA VTI: 2.8 CM2
ECHO AV AREA/BSA PEAK VELOCITY: 1.3 CM2/M2
ECHO AV AREA/BSA VTI: 1.4 CM2/M2
ECHO AV MEAN GRADIENT: 3 MMHG
ECHO AV MEAN VELOCITY: 0.8 M/S
ECHO AV PEAK GRADIENT: 5 MMHG
ECHO AV PEAK VELOCITY: 1.1 M/S
ECHO AV VELOCITY RATIO: 0.82
ECHO AV VTI: 21.5 CM
ECHO BSA: 2.1 M2
ECHO BSA: 2.1 M2
ECHO LA AREA 2C: 16.8 CM2
ECHO LA AREA 4C: 18.7 CM2
ECHO LA DIAMETER INDEX: 1.38 CM/M2
ECHO LA DIAMETER: 2.8 CM
ECHO LA MAJOR AXIS: 5.2 CM
ECHO LA MINOR AXIS: 5 CM
ECHO LA TO AORTIC ROOT RATIO: 1.56
ECHO LA VOL BP: 49 ML (ref 22–52)
ECHO LA VOL MOD A2C: 45 ML (ref 22–52)
ECHO LA VOL MOD A4C: 53 ML (ref 22–52)
ECHO LA VOL/BSA BIPLANE: 24 ML/M2 (ref 16–34)
ECHO LA VOLUME INDEX MOD A2C: 22 ML/M2 (ref 16–34)
ECHO LA VOLUME INDEX MOD A4C: 26 ML/M2 (ref 16–34)
ECHO LV E' LATERAL VELOCITY: 11.1 CM/S
ECHO LV E' SEPTAL VELOCITY: 9.79 CM/S
ECHO LV EDV A2C: 94 ML
ECHO LV EDV A4C: 67 ML
ECHO LV EDV INDEX A4C: 33 ML/M2
ECHO LV EDV NDEX A2C: 46 ML/M2
ECHO LV EF PHYSICIAN: 55 %
ECHO LV EJECTION FRACTION A2C: 71 %
ECHO LV EJECTION FRACTION A4C: 59 %
ECHO LV ESV A2C: 27 ML
ECHO LV ESV A4C: 27 ML
ECHO LV ESV INDEX A2C: 13 ML/M2
ECHO LV ESV INDEX A4C: 13 ML/M2
ECHO LV FRACTIONAL SHORTENING: 29 % (ref 28–44)
ECHO LV INTERNAL DIMENSION DIASTOLE INDEX: 2.22 CM/M2
ECHO LV INTERNAL DIMENSION DIASTOLIC: 4.5 CM (ref 3.9–5.3)
ECHO LV INTERNAL DIMENSION SYSTOLIC INDEX: 1.58 CM/M2
ECHO LV INTERNAL DIMENSION SYSTOLIC: 3.2 CM
ECHO LV IVSD: 1.2 CM (ref 0.6–0.9)
ECHO LV MASS 2D: 164 G (ref 67–162)
ECHO LV MASS INDEX 2D: 80.8 G/M2 (ref 43–95)
ECHO LV POSTERIOR WALL DIASTOLIC: 0.9 CM (ref 0.6–0.9)
ECHO LV RELATIVE WALL THICKNESS RATIO: 0.4
ECHO LVOT AREA: 3.5 CM2
ECHO LVOT AV VTI INDEX: 0.8
ECHO LVOT DIAM: 2.1 CM
ECHO LVOT MEAN GRADIENT: 2 MMHG
ECHO LVOT PEAK GRADIENT: 3 MMHG
ECHO LVOT PEAK GRADIENT: 3 MMHG
ECHO LVOT PEAK VELOCITY: 0.9 M/S
ECHO LVOT STROKE VOLUME INDEX: 29.2 ML/M2
ECHO LVOT SV: 59.2 ML
ECHO LVOT VTI: 17.1 CM
ECHO MV A VELOCITY: 0.32 M/S
ECHO MV AREA VTI: 3.9 CM2
ECHO MV E DECELERATION TIME (DT): 235 MS
ECHO MV E VELOCITY: 0.63 M/S
ECHO MV E/A RATIO: 1.97
ECHO MV E/E' LATERAL: 5.68
ECHO MV E/E' RATIO (AVERAGED): 6.06
ECHO MV E/E' SEPTAL: 6.44
ECHO MV LVOT VTI INDEX: 0.88
ECHO MV MAX VELOCITY: 0.9 M/S
ECHO MV MEAN GRADIENT: 1 MMHG
ECHO MV MEAN VELOCITY: 0.5 M/S
ECHO MV PEAK GRADIENT: 3 MMHG
ECHO MV VTI: 15 CM
ECHO RA AREA 4C: 13.8 CM2
ECHO RA END SYSTOLIC VOLUME APICAL 4 CHAMBER INDEX BSA: 16 ML/M2
ECHO RA VOLUME: 33 ML
ECHO RV BASAL DIMENSION: 2.9 CM
ECHO RV INTERNAL DIMENSION: 2 CM
ECHO RV LONGITUDINAL DIMENSION: 4.7 CM
ECHO RV MID DIMENSION: 2.4 CM
ECHO RV TAPSE: 2.2 CM (ref 1.7–?)
ECHO TV REGURGITANT MAX VELOCITY: 2.04 M/S
ECHO TV REGURGITANT PEAK GRADIENT: 17 MMHG
EKG P AXIS: NORMAL DEGREES
EKG P-R INTERVAL: NORMAL MS
EKG Q-T INTERVAL: 322 MS
EKG QRS DURATION: 106 MS
EKG QTC CALCULATION (BAZETT): 411 MS
EKG T AXIS: 94 DEGREES
EOSINOPHIL # BLD: 0 K/UL (ref 0–0.6)
EOSINOPHIL NFR BLD: 0.6 % (ref 0–5)
ERYTHROCYTE [DISTWIDTH] IN BLOOD BY AUTOMATED COUNT: 12.1 % (ref 11.5–14.5)
GLUCOSE SERPL-MCNC: 136 MG/DL (ref 70–99)
HCT VFR BLD AUTO: 43.2 % (ref 37–47)
HGB BLD-MCNC: 14.7 G/DL (ref 12–16)
IMM GRANULOCYTES # BLD: 0 K/UL
INR PPP: 1.25 (ref 0.88–1.18)
LYMPHOCYTES # BLD: 2.3 K/UL (ref 1.1–4.5)
LYMPHOCYTES NFR BLD: 33.2 % (ref 20–40)
MCH RBC QN AUTO: 31.1 PG (ref 27–31)
MCHC RBC AUTO-ENTMCNC: 34 G/DL (ref 33–37)
MCV RBC AUTO: 91.5 FL (ref 81–99)
MONOCYTES # BLD: 0.7 K/UL (ref 0–0.9)
MONOCYTES NFR BLD: 9.7 % (ref 0–10)
NEUTROPHILS # BLD: 3.8 K/UL (ref 1.5–7.5)
NEUTS SEG NFR BLD: 55.9 % (ref 50–65)
PLATELET # BLD AUTO: 198 K/UL (ref 130–400)
PMV BLD AUTO: 10.2 FL (ref 9.4–12.3)
POTASSIUM SERPL-SCNC: 4.1 MMOL/L (ref 3.5–5)
PROTHROMBIN TIME: 15.7 SEC (ref 12–14.6)
RBC # BLD AUTO: 4.72 M/UL (ref 4.2–5.4)
SODIUM SERPL-SCNC: 139 MMOL/L (ref 136–145)
WBC # BLD AUTO: 6.8 K/UL (ref 4.8–10.8)

## 2025-07-16 PROCEDURE — 93306 TTE W/DOPPLER COMPLETE: CPT | Performed by: INTERNAL MEDICINE

## 2025-07-16 PROCEDURE — 6370000000 HC RX 637 (ALT 250 FOR IP): Performed by: NURSE PRACTITIONER

## 2025-07-16 PROCEDURE — 5A2204Z RESTORATION OF CARDIAC RHYTHM, SINGLE: ICD-10-PCS | Performed by: INTERNAL MEDICINE

## 2025-07-16 PROCEDURE — 85025 COMPLETE CBC W/AUTO DIFF WBC: CPT

## 2025-07-16 PROCEDURE — 92960 CARDIOVERSION ELECTRIC EXT: CPT | Performed by: INTERNAL MEDICINE

## 2025-07-16 PROCEDURE — 85610 PROTHROMBIN TIME: CPT

## 2025-07-16 PROCEDURE — 6360000002 HC RX W HCPCS: Performed by: INTERNAL MEDICINE

## 2025-07-16 PROCEDURE — 80048 BASIC METABOLIC PNL TOTAL CA: CPT

## 2025-07-16 PROCEDURE — 36415 COLL VENOUS BLD VENIPUNCTURE: CPT

## 2025-07-16 PROCEDURE — 2709999900 HC NON-CHARGEABLE SUPPLY: Performed by: INTERNAL MEDICINE

## 2025-07-16 RX ORDER — DILTIAZEM HYDROCHLORIDE 180 MG/1
180 CAPSULE, COATED, EXTENDED RELEASE ORAL DAILY
Qty: 30 CAPSULE | Refills: 0 | Status: SHIPPED | OUTPATIENT
Start: 2025-07-16

## 2025-07-16 RX ORDER — FENTANYL CITRATE 50 UG/ML
INJECTION, SOLUTION INTRAMUSCULAR; INTRAVENOUS PRN
Status: DISCONTINUED | OUTPATIENT
Start: 2025-07-16 | End: 2025-07-16 | Stop reason: HOSPADM

## 2025-07-16 RX ORDER — MIDAZOLAM HYDROCHLORIDE 1 MG/ML
INJECTION, SOLUTION INTRAMUSCULAR; INTRAVENOUS PRN
Status: DISCONTINUED | OUTPATIENT
Start: 2025-07-16 | End: 2025-07-16 | Stop reason: HOSPADM

## 2025-07-16 RX ADMIN — CALCIUM 500 MG: 500 TABLET ORAL at 10:44

## 2025-07-16 RX ADMIN — FAMOTIDINE 40 MG: 20 TABLET, FILM COATED ORAL at 10:44

## 2025-07-16 RX ADMIN — DILTIAZEM HYDROCHLORIDE 30 MG: 30 TABLET, FILM COATED ORAL at 05:54

## 2025-07-16 RX ADMIN — SOTALOL HYDROCHLORIDE 80 MG: 80 TABLET ORAL at 11:46

## 2025-07-16 RX ADMIN — APIXABAN 5 MG: 5 TABLET, FILM COATED ORAL at 10:44

## 2025-07-16 RX ADMIN — DILTIAZEM HYDROCHLORIDE 30 MG: 30 TABLET, FILM COATED ORAL at 11:46

## 2025-07-16 RX ADMIN — Medication 1000 UNITS: at 10:44

## 2025-07-16 RX ADMIN — OXYCODONE HYDROCHLORIDE AND ACETAMINOPHEN 250 MG: 500 TABLET ORAL at 10:44

## 2025-07-16 RX ADMIN — LEVOTHYROXINE SODIUM 137 MCG: 0.14 TABLET ORAL at 05:55

## 2025-07-16 NOTE — DISCHARGE SUMMARY
Emerita Orourke  :  1955  MRN:  [id number]    Admit date:  7/15/2025  Discharge date:  2025    Discharging Physician:  Dr. Blayne Martin    Advance Directive: Full Code    Consults: IP CONSULT TO ELECTROPHYSIOLOGY     Primary Care Physician:  Rajesh Davis MD    Discharge Diagnoses:  Principal Problem:    Atrial fibrillation (HCC)  Active Problems:    Essential hypertension    PAF (paroxysmal atrial fibrillation) (HCC)    Chronic anticoagulation    Acquired hypothyroidism    Hyperlipidemia    GERD (gastroesophageal reflux disease)  Resolved Problems:    * No resolved hospital problems. *      Portions of this note have been copied forward, however, changed to reflect the most current clinical status of this patient.    Hospital Course:   The patient is a 69 y.o. female with a PMH of DVT, PAF on chronic anticoagulation with Eliquis, s/p loop recorder, HLD, GERD, HTN, thyroid disease who presented to [facility] ED on 7/15/2025 complaining of palpitations concerning for atrial fibrillation. She stated that she awoke when her alarm clock when off and she felt fatigued. She additionally stated that she felt generally weak with mild lightheadedness-she stated these are typical symptoms of atrial fibrillation in the past. She denied any recent illness including cough, chills, fatigue or fevers. Denied chest pain, overt shortness of breath or weight gain. She reported compliance to all medications without any missed doses. She has required a previous required cardioversion in  per Dr. Kline.      Further ED work-up revealed-initial EKG-atrial fibrillation with rvr-rate 132 Left axis, Incomplete RBBB Anterior infarct - age undetermined,Left ventricular hypertrophy, and lateral ST-T abnormality.  Chemistries were relatively other than mild hyperglycemia-glucose-142. Trop 17->19. Alk phos-109. WBC-4.6, H&H-13.7/40.4 and plt-166. Chest x-ray-The cardiac silhouette is prominent.  The aortic arch atherosclerotic..

## 2025-07-16 NOTE — PLAN OF CARE
Problem: ABCDS Injury Assessment  Goal: Absence of physical injury  7/15/2025 2215 by Arielle Agee LPN  Outcome: Progressing  7/15/2025 1706 by Santa Ulrich RN  Outcome: Progressing

## 2025-07-16 NOTE — DISCHARGE INSTRUCTIONS
Take medications as prescribed.    Attend follow up appointments as scheduled    Return to emergency department as needed.    Log blood pressures and heart rates leading up to appointment with Dr Malcolm- report any symptoms or concerns!    Date Heart Rate Blood Pressure  Symptoms

## 2025-07-16 NOTE — ACP (ADVANCE CARE PLANNING)
Advance Care Planning     Advance Care Planning Inpatient Note  Spiritual [facility] Department    Today's Date: 7/16/2025  Unit: [facility] 4 ONCOLOGY UNIT    Received request from rounding.  Upon review of chart and communication with care team, patient's decision making abilities are not in question.. Patient was/were present in the room during visit.    Goals of ACP Conversation:  Facilitate a discussion related to patient's goals of care as they align with the patient's values and beliefs.    Health Care Decision Makers:       Primary Decision Maker: Silvano Orourke - [facility] - [phone]  Summary:  Verified Healthcare Decision Maker     Advance Care Planning Documents (Patient Wishes):  None     Assessment:  The patient named her  as PDM.    Interventions:  Encouraged ongoing ACP conversation with future decision makers and loved ones    Care Preferences Communicated:   No    Outcomes/Plan:  Pt named PDM    Electronically signed by Chaplain Ball Mai on 7/16/2025 at 5:02 PM

## 2025-07-16 NOTE — PROGRESS NOTES
Spiritual Health History and Assessment/Progress Note  [facility]    Initial Encounter,  ,  ,      Name: Emerita Orourke MRN: [id number]    Age: 69 y.o.     Sex: female   Language: English   Oriental orthodox: Mennonite   Atrial fibrillation (HCC)     Date: 7/16/2025            Total Time Calculated: 30 min              Spiritual Assessment began in [facility] 4 ONCOLOGY UNIT        Referral/Consult From: Rounding   Encounter Overview/Reason: Initial Encounter  Service Provided For: Patient    Sheron, Belief, Meaning:   Patient identifies as spiritual  Family/Friends No family/friends present      Importance and Influence:  Patient has spiritual/personal beliefs that influence decisions regarding their health  Family/Friends No family/friends present    Community:  Patient is connected with a spiritual community  Family/Friends No family/friends present    Assessment and Plan of Care:   Patient was grateful to have the heart procedure completed. [place] and hope for healing. Has good support of her  and 4 children.  Patient Interventions include: Facilitated expression of thoughts and feelings, Explored spiritual coping/struggle/distress, and Assisted in Advance Care Planning conversation  Family/Friends Interventions include: No family/friends present    Patient Plan of Care: No future visits per patient/family request  Family/Friends Plan of Care: No family/friends present    Electronically signed by Chaplain Ball Mai on 7/16/2025 at 5:00 PM

## 2025-07-16 NOTE — PROGRESS NOTES
[facility] MEDICINE  - PROGRESS NOTE    Admit Date: 7/15/2025       7/16/2025    Subjective: Patient underwent successful cardioversion this morning    Objective:   Vitals: /66   Pulse (!) 49   Temp 97.9 °F (36.6 °C) (Oral)   Resp 16   Ht 1.651 m (5' 5\")   Wt 96.2 kg (212 lb)   SpO2 91%   BMI 35.28 kg/m²   General appearance: alert, appears stated age and cooperative  Skin: Skin color, texture, turgor normal.   HEENT: Head: Normocephalic, no lesions, without obvious abnormality.  Neck: no adenopathy, no carotid bruit, no JVD, supple, symmetrical, trachea midline, and thyroid not enlarged, symmetric, no tenderness/mass/nodules  Lungs: clear to auscultation bilaterally  Heart: regular rate and rhythm, S1, S2 normal, no murmur, click, rub or gallop  Abdomen: soft, non-tender; bowel sounds normal; no masses,  no organomegaly  Extremities: extremities normal, atraumatic, no cyanosis or edema  Lymphatic: No significant lymph node enlargement papable  Neurologic: Mental status: Alert, oriented, thought content appropriate      Data:   Scheduled Meds: Reviewed  Continuous Infusions:   dilTIAZem 125 mg in sodium chloride 0.9 % 125 mL infusion Stopped (07/15/25 2047)    sodium chloride Stopped (07/15/25 2047)       Intake/Output Summary (Last 24 hours) at 7/16/2025 1002  Last data filed at 7/16/2025 0932  Gross per 24 hour   Intake --   Output 0 ml   Net 0 ml     CBC:   Recent Labs     07/16/25  0733   WBC 6.8   HGB 14.7        BMP:  Recent Labs     07/16/25  0733      K 4.1      CO2 23   BUN 18   CREATININE 0.7   GLUCOSE 136*     ABGs: No results found for: \"PHART\", \"PO2ART\", \"FPR1SOJ\"  INR:   Recent Labs     07/16/25  0205   INR 1.25*     -----------------------------------------------------------------            Assessment/Plan    Patient Active Problem List:     PAF (paroxysmal atrial fibrillation) (HCC)     Abnormal stress echo

## 2025-07-24 ENCOUNTER — OFFICE VISIT (OUTPATIENT)
Dept: CARDIOLOGY CLINIC | Age: 70
End: 2025-07-24
Payer: MEDICARE

## 2025-07-24 VITALS
HEIGHT: 65 IN | HEART RATE: 54 BPM | SYSTOLIC BLOOD PRESSURE: 130 MMHG | WEIGHT: 211 LBS | BODY MASS INDEX: 35.16 KG/M2 | DIASTOLIC BLOOD PRESSURE: 86 MMHG

## 2025-07-24 DIAGNOSIS — I48.0 PAROXYSMAL ATRIAL FIBRILLATION (HCC): Primary | ICD-10-CM

## 2025-07-24 PROCEDURE — 1159F MED LIST DOCD IN RCRD: CPT | Performed by: INTERNAL MEDICINE

## 2025-07-24 PROCEDURE — 99213 OFFICE O/P EST LOW 20 MIN: CPT | Performed by: INTERNAL MEDICINE

## 2025-07-24 PROCEDURE — 1090F PRES/ABSN URINE INCON ASSESS: CPT | Performed by: INTERNAL MEDICINE

## 2025-07-24 PROCEDURE — 3079F DIAST BP 80-89 MM HG: CPT | Performed by: INTERNAL MEDICINE

## 2025-07-24 PROCEDURE — 93000 ELECTROCARDIOGRAM COMPLETE: CPT | Performed by: INTERNAL MEDICINE

## 2025-07-24 PROCEDURE — 3075F SYST BP GE 130 - 139MM HG: CPT | Performed by: INTERNAL MEDICINE

## 2025-07-24 PROCEDURE — 3017F COLORECTAL CA SCREEN DOC REV: CPT | Performed by: INTERNAL MEDICINE

## 2025-07-24 PROCEDURE — 1111F DSCHRG MED/CURRENT MED MERGE: CPT | Performed by: INTERNAL MEDICINE

## 2025-07-24 PROCEDURE — G8427 DOCREV CUR MEDS BY ELIG CLIN: HCPCS | Performed by: INTERNAL MEDICINE

## 2025-07-24 PROCEDURE — G8417 CALC BMI ABV UP PARAM F/U: HCPCS | Performed by: INTERNAL MEDICINE

## 2025-07-24 PROCEDURE — 1036F TOBACCO NON-USER: CPT | Performed by: INTERNAL MEDICINE

## 2025-07-24 PROCEDURE — 1123F ACP DISCUSS/DSCN MKR DOCD: CPT | Performed by: INTERNAL MEDICINE

## 2025-07-24 PROCEDURE — G8400 PT W/DXA NO RESULTS DOC: HCPCS | Performed by: INTERNAL MEDICINE

## 2025-07-24 NOTE — PROGRESS NOTES
[facility]  [address]  [address]  [facility] [address]  [phone]    Emerita Orourke is a 69 y.o. female presents with atrial fibrillation status post cardioversion.  She has been maintaining normal rhythm and feels well after the addition of diltiazem.  She has been taking her olmesartan and intermittently because of blood pressures in the 110/70 range      Review of Systems   Constitutional: Negative for fever, chills, diaphoresis, activity change, appetite change, fatigue and unexpected weight change.   Eyes: Negative for photophobia, pain, redness and visual disturbance.   Respiratory: Negative for apnea, cough, chest tightness, shortness of breath, wheezing and stridor.    Cardiovascular: Negative for chest pain, palpitations and leg swelling.   Gastrointestinal: Negative for abdominal distention.   Genitourinary: Negative for dysuria, urgency and frequency.   Musculoskeletal: Negative for myalgias, arthralgias and gait problem.   Skin: Negative for color change, pallor, rash and wound.   Neurological: Negative for dizziness, tremors, speech difficulty, weakness and numbness.   Hematological: Does not bruise/bleed easily.   Psychiatric/Behavioral: Negative.          Social History     Socioeconomic History    Marital status:      Spouse name: Not on file    Number of children: Not on file    Years of education: Not on file    Highest education level: Not on file   Occupational History    Not on file   Tobacco Use    Smoking status: Never    Smokeless tobacco: Never   Vaping Use    Vaping status: Never Used   Substance and Sexual Activity    Alcohol use: No    Drug use: No    Sexual activity: Not on file   Other Topics Concern    Not on file   Social History Narrative    Not on file     Social Drivers of Health     Financial Resource Strain: Not on file   Food Insecurity: No Food Insecurity (7/15/2025)    Hunger Vital Sign     Worried About Running Out of Food in the Last Year: Never true

## 2025-07-30 DIAGNOSIS — Z45.09 ENCOUNTER FOR LOOP RECORDER CHECK: Primary | ICD-10-CM

## 2025-07-30 DIAGNOSIS — I48.0 PAROXYSMAL ATRIAL FIBRILLATION (HCC): ICD-10-CM

## 2025-07-30 DIAGNOSIS — R00.1 BRADYCARDIA: ICD-10-CM

## 2025-08-06 DIAGNOSIS — R00.1 BRADYCARDIA: ICD-10-CM

## 2025-08-06 DIAGNOSIS — I48.0 PAROXYSMAL ATRIAL FIBRILLATION (HCC): ICD-10-CM

## 2025-08-06 DIAGNOSIS — Z45.09 ENCOUNTER FOR LOOP RECORDER CHECK: Primary | ICD-10-CM

## 2025-08-06 DIAGNOSIS — I48.0 PAF (PAROXYSMAL ATRIAL FIBRILLATION) (HCC): ICD-10-CM

## 2025-08-22 DIAGNOSIS — Z45.09 ENCOUNTER FOR LOOP RECORDER CHECK: Primary | ICD-10-CM

## 2025-08-22 DIAGNOSIS — I48.0 PAROXYSMAL ATRIAL FIBRILLATION (HCC): ICD-10-CM

## 2025-08-22 DIAGNOSIS — R00.1 BRADYCARDIA: ICD-10-CM

## 2025-08-22 DIAGNOSIS — I48.0 PAF (PAROXYSMAL ATRIAL FIBRILLATION) (HCC): ICD-10-CM

## 2025-08-27 DIAGNOSIS — Z45.09 ENCOUNTER FOR LOOP RECORDER CHECK: Primary | ICD-10-CM

## 2025-08-27 DIAGNOSIS — R00.1 BRADYCARDIA: ICD-10-CM

## 2025-08-27 DIAGNOSIS — I48.0 PAF (PAROXYSMAL ATRIAL FIBRILLATION) (HCC): ICD-10-CM

## 2025-08-27 PROCEDURE — 93298 REM INTERROG DEV EVAL SCRMS: CPT | Performed by: CLINICAL NURSE SPECIALIST

## 2025-08-29 DIAGNOSIS — Z79.01 CHRONIC ANTICOAGULATION: ICD-10-CM

## 2025-08-29 DIAGNOSIS — Z45.09 ENCOUNTER FOR LOOP RECORDER CHECK: Primary | ICD-10-CM

## 2025-08-29 DIAGNOSIS — R00.1 BRADYCARDIA: ICD-10-CM

## 2025-08-29 DIAGNOSIS — I48.0 PAROXYSMAL ATRIAL FIBRILLATION (HCC): ICD-10-CM

## 2025-08-29 DIAGNOSIS — I48.0 PAF (PAROXYSMAL ATRIAL FIBRILLATION) (HCC): ICD-10-CM

## (undated) DEVICE — ELECTRODES QUIK COMBO RTS DEFIB

## (undated) DEVICE — Device: Brand: NOMOLINE™ LH ADULT NASAL CO2 CANNULA WITH O2 4M